# Patient Record
Sex: MALE | Race: WHITE | NOT HISPANIC OR LATINO | Employment: UNEMPLOYED | URBAN - METROPOLITAN AREA
[De-identification: names, ages, dates, MRNs, and addresses within clinical notes are randomized per-mention and may not be internally consistent; named-entity substitution may affect disease eponyms.]

---

## 2023-03-24 LAB
ALANINE AMINOTRANSFERASE (SGPT) (U/L) IN SER/PLAS: 20 U/L (ref 10–52)
ALBUMIN (G/DL) IN SER/PLAS: 4.4 G/DL (ref 3.4–5)
ALKALINE PHOSPHATASE (U/L) IN SER/PLAS: 63 U/L (ref 33–136)
ANION GAP IN SER/PLAS: 16 MMOL/L (ref 10–20)
ASPARTATE AMINOTRANSFERASE (SGOT) (U/L) IN SER/PLAS: 19 U/L (ref 9–39)
BASOPHILS (10*3/UL) IN BLOOD BY AUTOMATED COUNT: 0.04 X10E9/L (ref 0–0.1)
BASOPHILS/100 LEUKOCYTES IN BLOOD BY AUTOMATED COUNT: 0.4 % (ref 0–2)
BILIRUBIN TOTAL (MG/DL) IN SER/PLAS: 0.7 MG/DL (ref 0–1.2)
CALCIUM (MG/DL) IN SER/PLAS: 9.7 MG/DL (ref 8.6–10.6)
CARBON DIOXIDE, TOTAL (MMOL/L) IN SER/PLAS: 29 MMOL/L (ref 21–32)
CHLORIDE (MMOL/L) IN SER/PLAS: 99 MMOL/L (ref 98–107)
CHOLESTEROL (MG/DL) IN SER/PLAS: 186 MG/DL (ref 0–199)
CHOLESTEROL IN HDL (MG/DL) IN SER/PLAS: 45.4 MG/DL
CHOLESTEROL/HDL RATIO: 4.1
CREATININE (MG/DL) IN SER/PLAS: 0.89 MG/DL (ref 0.5–1.3)
EOSINOPHILS (10*3/UL) IN BLOOD BY AUTOMATED COUNT: 0.09 X10E9/L (ref 0–0.7)
EOSINOPHILS/100 LEUKOCYTES IN BLOOD BY AUTOMATED COUNT: 1 % (ref 0–6)
ERYTHROCYTE DISTRIBUTION WIDTH (RATIO) BY AUTOMATED COUNT: 13.2 % (ref 11.5–14.5)
ERYTHROCYTE MEAN CORPUSCULAR HEMOGLOBIN CONCENTRATION (G/DL) BY AUTOMATED: 31.6 G/DL (ref 32–36)
ERYTHROCYTE MEAN CORPUSCULAR VOLUME (FL) BY AUTOMATED COUNT: 92 FL (ref 80–100)
ERYTHROCYTES (10*6/UL) IN BLOOD BY AUTOMATED COUNT: 5.18 X10E12/L (ref 4.5–5.9)
ESTIMATED AVERAGE GLUCOSE FOR HBA1C: 177 MG/DL
GFR MALE: >90 ML/MIN/1.73M2
GLUCOSE (MG/DL) IN SER/PLAS: 132 MG/DL (ref 74–99)
HEMATOCRIT (%) IN BLOOD BY AUTOMATED COUNT: 47.4 % (ref 41–52)
HEMOGLOBIN (G/DL) IN BLOOD: 15 G/DL (ref 13.5–17.5)
HEMOGLOBIN A1C/HEMOGLOBIN TOTAL IN BLOOD: 7.8 %
IMMATURE GRANULOCYTES/100 LEUKOCYTES IN BLOOD BY AUTOMATED COUNT: 0.3 % (ref 0–0.9)
LDL: 92 MG/DL (ref 0–99)
LEUKOCYTES (10*3/UL) IN BLOOD BY AUTOMATED COUNT: 9.4 X10E9/L (ref 4.4–11.3)
LYMPHOCYTES (10*3/UL) IN BLOOD BY AUTOMATED COUNT: 1.59 X10E9/L (ref 1.2–4.8)
LYMPHOCYTES/100 LEUKOCYTES IN BLOOD BY AUTOMATED COUNT: 16.9 % (ref 13–44)
MONOCYTES (10*3/UL) IN BLOOD BY AUTOMATED COUNT: 0.51 X10E9/L (ref 0.1–1)
MONOCYTES/100 LEUKOCYTES IN BLOOD BY AUTOMATED COUNT: 5.4 % (ref 2–10)
NEUTROPHILS (10*3/UL) IN BLOOD BY AUTOMATED COUNT: 7.13 X10E9/L (ref 1.2–7.7)
NEUTROPHILS/100 LEUKOCYTES IN BLOOD BY AUTOMATED COUNT: 76 % (ref 40–80)
NON HDL CHOLESTEROL: 141 MG/DL
NRBC (PER 100 WBCS) BY AUTOMATED COUNT: 0 /100 WBC (ref 0–0)
PLATELETS (10*3/UL) IN BLOOD AUTOMATED COUNT: 219 X10E9/L (ref 150–450)
POTASSIUM (MMOL/L) IN SER/PLAS: 4.6 MMOL/L (ref 3.5–5.3)
PROTEIN TOTAL: 7.1 G/DL (ref 6.4–8.2)
SODIUM (MMOL/L) IN SER/PLAS: 139 MMOL/L (ref 136–145)
THYROTROPIN (MIU/L) IN SER/PLAS BY DETECTION LIMIT <= 0.05 MIU/L: 3.84 MIU/L (ref 0.44–3.98)
THYROXINE (T4) FREE (NG/DL) IN SER/PLAS: 1.22 NG/DL (ref 0.78–1.48)
TRIGLYCERIDE (MG/DL) IN SER/PLAS: 241 MG/DL (ref 0–149)
UREA NITROGEN (MG/DL) IN SER/PLAS: 19 MG/DL (ref 6–23)
VLDL: 48 MG/DL (ref 0–40)

## 2023-10-06 ENCOUNTER — LAB (OUTPATIENT)
Dept: LAB | Facility: LAB | Age: 63
End: 2023-10-06
Payer: COMMERCIAL

## 2023-10-06 DIAGNOSIS — E11.9 TYPE 2 DIABETES MELLITUS WITHOUT COMPLICATIONS (MULTI): Primary | ICD-10-CM

## 2023-10-06 LAB
EST. AVERAGE GLUCOSE BLD GHB EST-MCNC: 163 MG/DL
HBA1C MFR BLD: 7.3 %

## 2023-10-06 PROCEDURE — 36415 COLL VENOUS BLD VENIPUNCTURE: CPT

## 2023-10-06 PROCEDURE — 83036 HEMOGLOBIN GLYCOSYLATED A1C: CPT

## 2023-10-09 DIAGNOSIS — K21.9 GASTROESOPHAGEAL REFLUX DISEASE, UNSPECIFIED WHETHER ESOPHAGITIS PRESENT: ICD-10-CM

## 2023-10-09 PROBLEM — R53.81 MALAISE AND FATIGUE: Status: ACTIVE | Noted: 2023-10-09

## 2023-10-09 PROBLEM — K42.9 UMBILICAL HERNIA: Status: ACTIVE | Noted: 2023-10-09

## 2023-10-09 PROBLEM — E11.9 DIABETES (MULTI): Status: ACTIVE | Noted: 2023-10-09

## 2023-10-09 PROBLEM — E78.5 HYPERLIPIDEMIA: Status: ACTIVE | Noted: 2023-10-09

## 2023-10-09 PROBLEM — M47.817 LUMBOSACRAL SPONDYLOSIS: Status: ACTIVE | Noted: 2023-10-09

## 2023-10-09 PROBLEM — M54.50 LOW BACK PAIN: Status: ACTIVE | Noted: 2023-10-09

## 2023-10-09 PROBLEM — M25.50 JOINT PAIN: Status: ACTIVE | Noted: 2023-10-09

## 2023-10-09 PROBLEM — G47.33 OBSTRUCTIVE SLEEP APNEA SYNDROME: Status: ACTIVE | Noted: 2023-10-09

## 2023-10-09 PROBLEM — K52.9 GASTROENTERITIS: Status: ACTIVE | Noted: 2023-10-09

## 2023-10-09 PROBLEM — F41.9 ANXIETY: Status: ACTIVE | Noted: 2023-10-09

## 2023-10-09 PROBLEM — I10 BENIGN ESSENTIAL HYPERTENSION: Status: ACTIVE | Noted: 2023-10-09

## 2023-10-09 PROBLEM — F17.210 NICOTINE DEPENDENCE, CIGARETTES, UNCOMPLICATED: Status: ACTIVE | Noted: 2023-10-09

## 2023-10-09 PROBLEM — R53.83 MALAISE AND FATIGUE: Status: ACTIVE | Noted: 2023-10-09

## 2023-10-09 PROBLEM — J20.9 COPD WITH ACUTE BRONCHITIS (MULTI): Status: ACTIVE | Noted: 2023-10-09

## 2023-10-09 PROBLEM — J44.0 COPD WITH ACUTE BRONCHITIS (MULTI): Status: ACTIVE | Noted: 2023-10-09

## 2023-10-09 PROBLEM — M10.9 GOUT: Status: ACTIVE | Noted: 2023-10-09

## 2023-10-09 PROBLEM — E11.9 TYPE 2 DIABETES MELLITUS WITHOUT COMPLICATIONS (MULTI): Status: ACTIVE | Noted: 2023-10-09

## 2023-10-09 PROBLEM — M54.2 CERVICALGIA: Status: ACTIVE | Noted: 2023-10-09

## 2023-10-09 PROBLEM — R06.81 APNEA: Status: ACTIVE | Noted: 2023-10-09

## 2023-10-09 PROBLEM — M17.12 PRIMARY OSTEOARTHRITIS OF LEFT KNEE: Status: ACTIVE | Noted: 2023-10-09

## 2023-10-09 PROBLEM — R53.83 FATIGUE: Status: ACTIVE | Noted: 2023-10-09

## 2023-10-09 PROBLEM — R14.0 ABDOMINAL BLOATING: Status: ACTIVE | Noted: 2023-10-09

## 2023-10-09 PROBLEM — M54.32 SCIATICA OF LEFT SIDE: Status: ACTIVE | Noted: 2023-10-09

## 2023-10-09 PROBLEM — K63.5 COLON POLYPS: Status: ACTIVE | Noted: 2023-10-09

## 2023-10-09 PROBLEM — J45.901 ASTHMATIC BRONCHITIS WITH ACUTE EXACERBATION (HHS-HCC): Status: ACTIVE | Noted: 2023-10-09

## 2023-10-09 PROBLEM — G47.00 INSOMNIA: Status: ACTIVE | Noted: 2023-10-09

## 2023-10-09 PROBLEM — M54.16 LUMBAR RADICULOPATHY, CHRONIC: Status: ACTIVE | Noted: 2023-10-09

## 2023-10-09 RX ORDER — ZINC GLUCONATE 50 MG
1 TABLET ORAL DAILY
COMMUNITY
Start: 2022-09-08 | End: 2023-11-16 | Stop reason: ALTCHOICE

## 2023-10-09 RX ORDER — METFORMIN HYDROCHLORIDE 500 MG/1
1000 TABLET ORAL 2 TIMES DAILY
COMMUNITY
End: 2023-12-18 | Stop reason: SDUPTHER

## 2023-10-09 RX ORDER — BUDESONIDE 180 UG/1
AEROSOL, POWDER RESPIRATORY (INHALATION) 2 TIMES DAILY PRN
COMMUNITY
Start: 2021-09-02 | End: 2024-02-07 | Stop reason: ALTCHOICE

## 2023-10-09 RX ORDER — BUDESONIDE AND FORMOTEROL FUMARATE DIHYDRATE 160; 4.5 UG/1; UG/1
2 AEROSOL RESPIRATORY (INHALATION) 2 TIMES DAILY PRN
COMMUNITY
Start: 2021-09-02

## 2023-10-09 RX ORDER — GABAPENTIN 100 MG/1
1 CAPSULE ORAL
COMMUNITY
Start: 2022-05-05 | End: 2023-11-16 | Stop reason: ALTCHOICE

## 2023-10-09 RX ORDER — FLUTICASONE PROPIONATE 50 MCG
1 SPRAY, SUSPENSION (ML) NASAL 2 TIMES DAILY PRN
COMMUNITY
End: 2024-03-12 | Stop reason: SDUPTHER

## 2023-10-09 RX ORDER — PNV NO.95/FERROUS FUM/FOLIC AC 28MG-0.8MG
1 TABLET ORAL DAILY
COMMUNITY
Start: 2022-09-08 | End: 2023-11-16 | Stop reason: ALTCHOICE

## 2023-10-09 RX ORDER — ASPIRIN 325 MG
1 TABLET ORAL DAILY
COMMUNITY
Start: 2022-09-08 | End: 2023-11-16 | Stop reason: ALTCHOICE

## 2023-10-09 RX ORDER — GABAPENTIN 300 MG/1
300 CAPSULE ORAL 3 TIMES DAILY
COMMUNITY
Start: 2022-12-22 | End: 2023-11-16 | Stop reason: ALTCHOICE

## 2023-10-09 RX ORDER — FOLIC ACID 1 MG/1
1 TABLET ORAL DAILY
COMMUNITY
End: 2024-02-12

## 2023-10-09 RX ORDER — IBUPROFEN 100 MG/5ML
1 SUSPENSION, ORAL (FINAL DOSE FORM) ORAL DAILY
COMMUNITY
Start: 2022-09-08 | End: 2023-11-16 | Stop reason: ALTCHOICE

## 2023-10-09 RX ORDER — MULTIVIT WITH MINERALS/HERBS
1 TABLET ORAL 2 TIMES DAILY
COMMUNITY
Start: 2022-09-08 | End: 2023-11-16 | Stop reason: ALTCHOICE

## 2023-10-09 RX ORDER — ROSUVASTATIN CALCIUM 40 MG/1
40 TABLET, COATED ORAL DAILY
COMMUNITY
End: 2023-11-03

## 2023-10-09 RX ORDER — ALLOPURINOL 300 MG/1
300 TABLET ORAL DAILY
COMMUNITY
End: 2023-11-16 | Stop reason: ALTCHOICE

## 2023-10-09 RX ORDER — OMEPRAZOLE 40 MG/1
40 CAPSULE, DELAYED RELEASE ORAL
Qty: 30 CAPSULE | Refills: 0 | Status: SHIPPED | OUTPATIENT
Start: 2023-10-09 | End: 2023-11-16 | Stop reason: ALTCHOICE

## 2023-10-09 RX ORDER — AMLODIPINE BESYLATE 10 MG/1
10 TABLET ORAL DAILY
COMMUNITY
End: 2024-01-02 | Stop reason: SDUPTHER

## 2023-10-09 RX ORDER — VALACYCLOVIR HYDROCHLORIDE 1 G/1
1 TABLET, FILM COATED ORAL 3 TIMES DAILY
COMMUNITY
Start: 2022-02-25 | End: 2023-11-16 | Stop reason: ALTCHOICE

## 2023-10-09 RX ORDER — FENOFIBRATE 145 MG/1
145 TABLET, FILM COATED ORAL DAILY
COMMUNITY
End: 2024-01-02 | Stop reason: SDUPTHER

## 2023-10-09 RX ORDER — TRIAMCINOLONE ACETONIDE 1 MG/G
OINTMENT TOPICAL
COMMUNITY
Start: 2020-08-20

## 2023-10-09 RX ORDER — LANCETS 33 GAUGE
EACH MISCELLANEOUS
COMMUNITY
Start: 2023-08-16 | End: 2023-12-06 | Stop reason: SDUPTHER

## 2023-10-11 DIAGNOSIS — E78.5 HYPERLIPIDEMIA, UNSPECIFIED HYPERLIPIDEMIA TYPE: ICD-10-CM

## 2023-10-11 RX ORDER — ICOSAPENT ETHYL 1000 MG/1
1 CAPSULE ORAL DAILY
COMMUNITY
End: 2023-10-11 | Stop reason: SDUPTHER

## 2023-10-12 RX ORDER — ICOSAPENT ETHYL 1000 MG/1
1 CAPSULE ORAL DAILY
Qty: 90 CAPSULE | Refills: 0 | Status: SHIPPED | OUTPATIENT
Start: 2023-10-12 | End: 2024-01-15

## 2023-11-03 DIAGNOSIS — E78.5 HYPERLIPIDEMIA, UNSPECIFIED HYPERLIPIDEMIA TYPE: ICD-10-CM

## 2023-11-03 RX ORDER — ROSUVASTATIN CALCIUM 40 MG/1
40 TABLET, COATED ORAL DAILY
Qty: 90 TABLET | Refills: 0 | Status: SHIPPED | OUTPATIENT
Start: 2023-11-03 | End: 2024-02-05

## 2023-11-16 ENCOUNTER — OFFICE VISIT (OUTPATIENT)
Dept: PRIMARY CARE | Facility: CLINIC | Age: 63
End: 2023-11-16
Payer: COMMERCIAL

## 2023-11-16 VITALS — HEART RATE: 72 BPM | RESPIRATION RATE: 18 BRPM | BODY MASS INDEX: 35.81 KG/M2 | HEIGHT: 75 IN | WEIGHT: 288 LBS

## 2023-11-16 DIAGNOSIS — M47.817 LUMBAR AND SACRAL SPONDYLARTHRITIS: Primary | ICD-10-CM

## 2023-11-16 DIAGNOSIS — M47.27 OSTEOARTHRITIS OF SPINE WITH RADICULOPATHY, LUMBOSACRAL REGION: ICD-10-CM

## 2023-11-16 DIAGNOSIS — R53.83 MALAISE AND FATIGUE: ICD-10-CM

## 2023-11-16 DIAGNOSIS — R53.81 MALAISE AND FATIGUE: ICD-10-CM

## 2023-11-16 DIAGNOSIS — K21.9 GASTROESOPHAGEAL REFLUX DISEASE WITHOUT ESOPHAGITIS: ICD-10-CM

## 2023-11-16 DIAGNOSIS — E11.9 TYPE 2 DIABETES MELLITUS WITHOUT COMPLICATION, WITHOUT LONG-TERM CURRENT USE OF INSULIN (MULTI): ICD-10-CM

## 2023-11-16 DIAGNOSIS — J20.9 COPD WITH ACUTE BRONCHITIS (MULTI): ICD-10-CM

## 2023-11-16 DIAGNOSIS — I10 BENIGN ESSENTIAL HYPERTENSION: ICD-10-CM

## 2023-11-16 DIAGNOSIS — M54.16 LUMBAR RADICULOPATHY, CHRONIC: ICD-10-CM

## 2023-11-16 DIAGNOSIS — E78.49 OTHER HYPERLIPIDEMIA: ICD-10-CM

## 2023-11-16 DIAGNOSIS — J44.0 COPD WITH ACUTE BRONCHITIS (MULTI): ICD-10-CM

## 2023-11-16 DIAGNOSIS — E03.9 ACQUIRED HYPOTHYROIDISM: ICD-10-CM

## 2023-11-16 DIAGNOSIS — F17.210 NICOTINE DEPENDENCE, CIGARETTES, UNCOMPLICATED: ICD-10-CM

## 2023-11-16 DIAGNOSIS — R52 GENERALIZED PAIN: ICD-10-CM

## 2023-11-16 DIAGNOSIS — N40.0 BENIGN PROSTATIC HYPERPLASIA WITHOUT LOWER URINARY TRACT SYMPTOMS: ICD-10-CM

## 2023-11-16 PROBLEM — M79.641 PAIN OF RIGHT HAND: Status: ACTIVE | Noted: 2023-11-16

## 2023-11-16 PROBLEM — J32.9 SINUSITIS: Status: ACTIVE | Noted: 2023-11-16

## 2023-11-16 PROBLEM — M79.675 CHRONIC PAIN OF TOES OF BOTH FEET: Status: ACTIVE | Noted: 2023-11-16

## 2023-11-16 PROBLEM — J31.0 CHRONIC RHINITIS: Status: RESOLVED | Noted: 2023-11-16 | Resolved: 2023-11-16

## 2023-11-16 PROBLEM — M25.562 LEFT KNEE PAIN: Status: ACTIVE | Noted: 2023-11-16

## 2023-11-16 PROBLEM — B36.9 FUNGAL SKIN INFECTION: Status: ACTIVE | Noted: 2023-11-16

## 2023-11-16 PROBLEM — G60.9 IDIOPATHIC PERIPHERAL NEUROPATHY: Status: ACTIVE | Noted: 2023-11-16

## 2023-11-16 PROBLEM — J34.2 NASAL SEPTAL DEVIATION: Status: ACTIVE | Noted: 2023-11-16

## 2023-11-16 PROBLEM — L40.9 PSORIASIS: Status: ACTIVE | Noted: 2023-11-16

## 2023-11-16 PROBLEM — M79.674 CHRONIC PAIN OF TOES OF BOTH FEET: Status: ACTIVE | Noted: 2023-11-16

## 2023-11-16 PROBLEM — M54.9 PAIN, UPPER BACK: Status: ACTIVE | Noted: 2023-11-16

## 2023-11-16 PROBLEM — L73.9 FOLLICULITIS: Status: ACTIVE | Noted: 2023-11-16

## 2023-11-16 PROBLEM — M47.816 LUMBAR SPONDYLOSIS: Status: ACTIVE | Noted: 2023-11-16

## 2023-11-16 PROBLEM — L03.112 CELLULITIS OF AXILLA, LEFT: Status: RESOLVED | Noted: 2023-11-16 | Resolved: 2023-11-16

## 2023-11-16 PROBLEM — R73.9 HYPERGLYCEMIA: Status: ACTIVE | Noted: 2023-11-16

## 2023-11-16 PROBLEM — M79.89 BILATERAL SWELLING OF FEET: Status: ACTIVE | Noted: 2023-11-16

## 2023-11-16 PROBLEM — M19.049 HAND ARTHRITIS: Status: ACTIVE | Noted: 2023-11-16

## 2023-11-16 PROBLEM — G89.29 CHRONIC PAIN OF TOES OF BOTH FEET: Status: ACTIVE | Noted: 2023-11-16

## 2023-11-16 PROBLEM — E11.42 DIABETIC PERIPHERAL NEUROPATHY (MULTI): Status: ACTIVE | Noted: 2023-11-16

## 2023-11-16 PROBLEM — L30.9 DERMATITIS: Status: ACTIVE | Noted: 2023-11-16

## 2023-11-16 PROBLEM — M19.041 DEGENERATIVE ARTHRITIS OF FINGER, RIGHT: Status: ACTIVE | Noted: 2023-11-16

## 2023-11-16 PROBLEM — R20.2 PARESTHESIA OF BOTH FEET: Status: ACTIVE | Noted: 2023-11-16

## 2023-11-16 PROBLEM — E66.9 OBESITY (BMI 30-39.9): Status: ACTIVE | Noted: 2023-11-16

## 2023-11-16 PROBLEM — G57.93 NEUROPATHIC PAIN OF BOTH FEET: Status: ACTIVE | Noted: 2023-11-16

## 2023-11-16 PROCEDURE — 99214 OFFICE O/P EST MOD 30 MIN: CPT | Performed by: INTERNAL MEDICINE

## 2023-11-16 PROCEDURE — 3051F HG A1C>EQUAL 7.0%<8.0%: CPT | Performed by: INTERNAL MEDICINE

## 2023-11-16 RX ORDER — OMEPRAZOLE 40 MG/1
1 CAPSULE, DELAYED RELEASE ORAL DAILY
COMMUNITY
Start: 2015-10-28 | End: 2024-01-08

## 2023-11-16 RX ORDER — ALLOPURINOL 200 MG/1
300 TABLET ORAL
COMMUNITY
End: 2024-02-07 | Stop reason: ALTCHOICE

## 2023-11-16 RX ORDER — TRAMADOL HYDROCHLORIDE 50 MG/1
50 TABLET ORAL DAILY
Qty: 30 TABLET | Refills: 0 | Status: SHIPPED | OUTPATIENT
Start: 2023-11-16 | End: 2024-02-20 | Stop reason: ALTCHOICE

## 2023-11-16 RX ORDER — PREDNISONE 10 MG/1
TABLET ORAL
Qty: 30 TABLET | Refills: 0 | Status: SHIPPED | OUTPATIENT
Start: 2023-11-16 | End: 2023-11-26

## 2023-11-16 ASSESSMENT — ENCOUNTER SYMPTOMS
CONSTITUTIONAL NEGATIVE: 1
NEUROLOGICAL NEGATIVE: 1
MUSCULOSKELETAL NEGATIVE: 1
HEMATOLOGIC/LYMPHATIC NEGATIVE: 1
ALLERGIC/IMMUNOLOGIC NEGATIVE: 1
PSYCHIATRIC NEGATIVE: 1
RESPIRATORY NEGATIVE: 1
GASTROINTESTINAL NEGATIVE: 1
CARDIOVASCULAR NEGATIVE: 1
EYES NEGATIVE: 1
ENDOCRINE NEGATIVE: 1

## 2023-11-16 NOTE — ASSESSMENT & PLAN NOTE
DDD - Degenerative disc disease of the Lumbo-Sacral (LS)/spine. Educate exercises and referred patient to Physical Therapy (PT). Ordered X-Ray's of the LS/ spine. Consider MRI. Radiculopathy in the distribution of L4-L5-S1/ nerve roots, needs NSAIDS (Naprosin 500mg BID vs. Arthrotec 75mg BID or Prednisone taper (Medrol dose pack), Flexeril 10 mg qHS, heat application, and pain control with Tylenol 325 mg TID.

## 2023-11-16 NOTE — ASSESSMENT & PLAN NOTE
HTN - hypertension well/controlled .Target BP < 130/80  achieved. Educate low salt diet and exercise with weight loss. Educate home self monitoring and diary keeping. Educate risks of elevate blood pressure and benefits of prompt treatment.  Refill Amlodipine

## 2023-11-16 NOTE — ASSESSMENT & PLAN NOTE
Hypercholesterolemia - Monitor lipid profile and educate patient upon risks of high cholesterol and targets. Educate diet and change in lifestyle and increase in exercises - Refill: Fenofibrate and  Rosuvastatin and Vascepa  and educate compliance with medication and diet.

## 2023-11-16 NOTE — PROGRESS NOTES
"Subjective   Patient ID: Hammad Blackburn is a 62 y.o. male who presents for Follow-up (3 month follow up).    HPI     Review of Systems   Constitutional: Negative.    HENT: Negative.     Eyes: Negative.    Respiratory: Negative.     Cardiovascular: Negative.    Gastrointestinal: Negative.    Endocrine: Negative.    Musculoskeletal: Negative.    Skin: Negative.    Allergic/Immunologic: Negative.    Neurological: Negative.    Hematological: Negative.    Psychiatric/Behavioral: Negative.     All other systems reviewed and are negative.      Objective   Pulse 72   Ht 1.905 m (6' 3\")   Wt 131 kg (288 lb)   BMI 36.00 kg/m²     Physical Exam  Vitals and nursing note reviewed.   Constitutional:       Appearance: Normal appearance.   HENT:      Head: Normocephalic and atraumatic.      Right Ear: Tympanic membrane, ear canal and external ear normal.      Left Ear: Tympanic membrane, ear canal and external ear normal. There is no impacted cerumen.      Nose: Nose normal.      Mouth/Throat:      Mouth: Mucous membranes are moist.      Pharynx: Oropharynx is clear.   Eyes:      Extraocular Movements: Extraocular movements intact.      Conjunctiva/sclera: Conjunctivae normal.      Pupils: Pupils are equal, round, and reactive to light.   Cardiovascular:      Rate and Rhythm: Normal rate and regular rhythm.      Pulses: Normal pulses.      Heart sounds: Normal heart sounds. No murmur heard.  Pulmonary:      Effort: Pulmonary effort is normal. No respiratory distress.      Breath sounds: Normal breath sounds. No stridor. No wheezing, rhonchi or rales.   Chest:      Chest wall: No tenderness.   Abdominal:      General: Abdomen is flat. Bowel sounds are normal. There is no distension.      Palpations: Abdomen is soft. There is no mass.      Tenderness: There is no abdominal tenderness. There is no right CVA tenderness, left CVA tenderness, guarding or rebound.      Hernia: No hernia is present.   Musculoskeletal:         General: " Normal range of motion.      Cervical back: Normal range of motion and neck supple.   Skin:     General: Skin is warm.      Capillary Refill: Capillary refill takes less than 2 seconds.   Neurological:      General: No focal deficit present.      Mental Status: He is alert.      Cranial Nerves: No cranial nerve deficit.      Sensory: No sensory deficit.      Motor: No weakness.      Coordination: Coordination normal.      Gait: Gait normal.      Deep Tendon Reflexes: Reflexes normal.   Psychiatric:         Mood and Affect: Mood normal.         Behavior: Behavior normal. Behavior is cooperative.         Thought Content: Thought content normal.         Judgment: Judgment normal.         Assessment/Plan   Problem List Items Addressed This Visit             ICD-10-CM    Benign essential hypertension I10     HTN - hypertension well/controlled .Target BP < 130/80  achieved. Educate low salt diet and exercise with weight loss. Educate home self monitoring and diary keeping. Educate risks of elevate blood pressure and benefits of prompt treatment.  Refill Amlodipine          COPD with acute bronchitis (CMS/[facility]) J44.0, J20.9     COPD - Educate tobacco cessation extensively , no wheezing, no SOB, no Crackles heard/ Exacerbation.         Get CXR.  Continues mild exercises and inhalers.  [place] preventive care and vaccinations.                                       Add advair inhalers and spiriva inhaler.           Esophageal reflux K21.9     GERD - Acid reflux disease. Rx. PPI (Prilosec/Prevacid/Protonix/Nexium) and educate diet and life style changes. Referred patient to an endoscopy (EGD) and check H. Pylori titers.          Hyperlipidemia E78.5     Hypercholesterolemia - Monitor lipid profile and educate patient upon risks of high cholesterol and targets. Educate diet and change in lifestyle and increase in exercises - Refill: Fenofibrate and  Rosuvastatin and Vascepa  and educate compliance with medication and diet.            Malaise and fatigue R53.81, R53.83     Fatigue - check CMP(metabolic panel and elctrolytes) , CBC(complete blood cell count), TSH(thyroid function). Insomnia may play a role and sleep studies(rule out sleep apnea) are recommended . Educate sleep hygiene. Consider anxiety disorder vs. depression. Consider Stress test, and 2DECHO.           Lumbosacral spondylosis M47.817     DDD - Degenerative disc disease of the Lumbo-Sacral (LS)/spine. Educate exercises and referred patient to Physical Therapy (PT). Ordered X-Ray's of the LS/ spine. Consider MRI. Radiculopathy in the distribution of L4-L5-S1/ nerve roots, needs NSAIDS (Naprosin 500mg BID vs. Arthrotec 75mg BID or Prednisone taper (Medrol dose pack), Flexeril 10 mg qHS, heat application, and pain control with Tylenol 325 mg TID.            Lumbar radiculopathy, chronic M54.16     DDD - Degenerative disc disease of the Lumbo-Sacral (LS)/spine. Educate exercises and referred patient to Physical Therapy (PT). Ordered X-Ray's of the LS/ spine. Consider MRI. Radiculopathy in the distribution of L4-L5-S1/ nerve roots, needs NSAIDS (Naprosin 500mg BID vs. Arthrotec 75mg BID or Prednisone taper (Medrol dose pack), Flexeril 10 mg qHS, heat application, and pain control with Tylenol 325 mg TID.           Nicotine dependence, cigarettes, uncomplicated F17.210     Tobacco cessation - Educate risks of smoking (CAD/COPD/CANCER of the lung or urinary bladder/CVA). Educate life style changes and prescribe nicotine patch and Wellbutrin 150mg BID. Educate stress reduction.                                                                                      Type 2 diabetes mellitus without complications (CMS/[facility]) E11.9     DM - NIDDM  . Reviewed with patient / Will check  HbA1c and fasting blood sugars. Educate home self monitoring and diary keeping(reviewed with patient home blood sugar levels /diary). Educate extensively low calorie diet and weight loss with exercise. Reviewed  BS- diary and Rx. Regimen. [place] renal protection with ARB/ACEI.               Educate compliance with diet and Rx. And educate risks and autcomes.                                                 Generalized pain R52     Other Visit Diagnoses         Codes    Lumbar and sacral spondylarthritis    -  Primary M47.817    Relevant Medications    predniSONE (Deltasone) 10 mg tablet    Other Relevant Orders    Referral to Spine Surgery    Referral to Pain Medicine

## 2023-11-16 NOTE — ASSESSMENT & PLAN NOTE
DM - NIDDM  . Reviewed with patient / Will check  HbA1c and fasting blood sugars. Educate home self monitoring and diary keeping(reviewed with patient home blood sugar levels /diary). Educate extensively low calorie diet and weight loss with exercise. Reviewed BS- diary and Rx. Regimen. [place] renal protection with ARB/ACEI.               Educate compliance with diet and Rx. And educate risks and autcomes.

## 2023-11-16 NOTE — ASSESSMENT & PLAN NOTE
COPD - Educate tobacco cessation extensively , no wheezing, no SOB, no Crackles heard/ Exacerbation.         Get CXR.  Continues mild exercises and inhalers.  [place] preventive care and vaccinations.                                       Add advair inhalers and spiriva inhaler.

## 2023-11-16 NOTE — ASSESSMENT & PLAN NOTE
Tobacco cessation - Educate risks of smoking (CAD/COPD/CANCER of the lung or urinary bladder/CVA). Educate life style changes and prescribe nicotine patch and Wellbutrin 150mg BID. Educate stress reduction.

## 2023-12-06 DIAGNOSIS — E11.9 TYPE 2 DIABETES MELLITUS WITHOUT COMPLICATION, WITHOUT LONG-TERM CURRENT USE OF INSULIN (MULTI): ICD-10-CM

## 2023-12-06 RX ORDER — LANCETS 33 GAUGE
1 EACH MISCELLANEOUS DAILY
Qty: 100 EACH | Refills: 0 | Status: SHIPPED | OUTPATIENT
Start: 2023-12-06 | End: 2024-03-12 | Stop reason: SDUPTHER

## 2023-12-18 DIAGNOSIS — E11.9 TYPE 2 DIABETES MELLITUS WITHOUT COMPLICATION, WITHOUT LONG-TERM CURRENT USE OF INSULIN (MULTI): ICD-10-CM

## 2023-12-18 RX ORDER — METFORMIN HYDROCHLORIDE 500 MG/1
1000 TABLET ORAL 2 TIMES DAILY
Qty: 360 TABLET | Refills: 0 | Status: ON HOLD | OUTPATIENT
Start: 2023-12-18 | End: 2024-03-11 | Stop reason: SDUPTHER

## 2024-01-02 ENCOUNTER — TELEPHONE (OUTPATIENT)
Dept: PRIMARY CARE | Facility: CLINIC | Age: 64
End: 2024-01-02
Payer: COMMERCIAL

## 2024-01-02 DIAGNOSIS — E78.49 OTHER HYPERLIPIDEMIA: ICD-10-CM

## 2024-01-02 DIAGNOSIS — I10 BENIGN ESSENTIAL HYPERTENSION: ICD-10-CM

## 2024-01-02 RX ORDER — AMLODIPINE BESYLATE 10 MG/1
10 TABLET ORAL DAILY
Qty: 90 TABLET | Refills: 0 | Status: SHIPPED | OUTPATIENT
Start: 2024-01-02 | End: 2024-04-01

## 2024-01-02 RX ORDER — FENOFIBRATE 145 MG/1
145 TABLET, FILM COATED ORAL DAILY
Qty: 90 TABLET | Refills: 0 | Status: SHIPPED | OUTPATIENT
Start: 2024-01-02 | End: 2024-04-01

## 2024-01-06 DIAGNOSIS — M17.12 ARTHRITIS OF LEFT KNEE: Primary | ICD-10-CM

## 2024-01-07 DIAGNOSIS — K21.9 GASTRO-ESOPHAGEAL REFLUX DISEASE WITHOUT ESOPHAGITIS: ICD-10-CM

## 2024-01-08 PROBLEM — M17.12 ARTHRITIS OF LEFT KNEE: Status: ACTIVE | Noted: 2024-01-06

## 2024-01-08 RX ORDER — OMEPRAZOLE 40 MG/1
40 CAPSULE, DELAYED RELEASE ORAL DAILY
Qty: 90 CAPSULE | Refills: 0 | Status: SHIPPED | OUTPATIENT
Start: 2024-01-08 | End: 2024-04-08 | Stop reason: SDUPTHER

## 2024-01-14 DIAGNOSIS — E78.5 HYPERLIPIDEMIA, UNSPECIFIED HYPERLIPIDEMIA TYPE: ICD-10-CM

## 2024-01-15 RX ORDER — ICOSAPENT ETHYL 1000 MG/1
1 CAPSULE ORAL DAILY
Qty: 90 CAPSULE | Refills: 0 | Status: SHIPPED | OUTPATIENT
Start: 2024-01-15 | End: 2024-04-11

## 2024-02-04 DIAGNOSIS — E78.5 HYPERLIPIDEMIA, UNSPECIFIED HYPERLIPIDEMIA TYPE: ICD-10-CM

## 2024-02-05 RX ORDER — ROSUVASTATIN CALCIUM 40 MG/1
40 TABLET, COATED ORAL DAILY
Qty: 90 TABLET | Refills: 0 | Status: SHIPPED | OUTPATIENT
Start: 2024-02-05 | End: 2024-05-10

## 2024-02-06 DIAGNOSIS — M17.12 ARTHRITIS OF LEFT KNEE: Primary | ICD-10-CM

## 2024-02-07 ENCOUNTER — PRE-ADMISSION TESTING (OUTPATIENT)
Dept: PREADMISSION TESTING | Facility: HOSPITAL | Age: 64
End: 2024-02-07
Payer: COMMERCIAL

## 2024-02-07 ENCOUNTER — OFFICE VISIT (OUTPATIENT)
Dept: ORTHOPEDIC SURGERY | Facility: CLINIC | Age: 64
End: 2024-02-07
Payer: COMMERCIAL

## 2024-02-07 ENCOUNTER — APPOINTMENT (OUTPATIENT)
Dept: RADIOLOGY | Facility: HOSPITAL | Age: 64
End: 2024-02-07
Payer: COMMERCIAL

## 2024-02-07 ENCOUNTER — HOSPITAL ENCOUNTER (OUTPATIENT)
Dept: RADIOLOGY | Facility: HOSPITAL | Age: 64
Discharge: HOME | End: 2024-02-07
Payer: COMMERCIAL

## 2024-02-07 ENCOUNTER — APPOINTMENT (OUTPATIENT)
Dept: PREADMISSION TESTING | Facility: HOSPITAL | Age: 64
End: 2024-02-07
Payer: COMMERCIAL

## 2024-02-07 VITALS
DIASTOLIC BLOOD PRESSURE: 84 MMHG | RESPIRATION RATE: 18 BRPM | TEMPERATURE: 97.1 F | OXYGEN SATURATION: 97 % | SYSTOLIC BLOOD PRESSURE: 146 MMHG | WEIGHT: 285.27 LBS | HEIGHT: 75 IN | BODY MASS INDEX: 35.47 KG/M2 | HEART RATE: 67 BPM

## 2024-02-07 VITALS — WEIGHT: 288 LBS | BODY MASS INDEX: 36 KG/M2

## 2024-02-07 DIAGNOSIS — G89.29 CHRONIC PAIN OF LEFT KNEE: ICD-10-CM

## 2024-02-07 DIAGNOSIS — M25.562 CHRONIC PAIN OF LEFT KNEE: ICD-10-CM

## 2024-02-07 DIAGNOSIS — M17.12 ARTHRITIS OF LEFT KNEE: ICD-10-CM

## 2024-02-07 DIAGNOSIS — R73.09 ELEVATED GLUCOSE: ICD-10-CM

## 2024-02-07 DIAGNOSIS — M17.12 PRIMARY OSTEOARTHRITIS OF LEFT KNEE: Primary | ICD-10-CM

## 2024-02-07 DIAGNOSIS — Z01.810 PREOP CARDIOVASCULAR EXAM: Primary | ICD-10-CM

## 2024-02-07 LAB
ANION GAP SERPL CALC-SCNC: 16 MMOL/L (ref 10–20)
BASOPHILS # BLD AUTO: 0.02 X10*3/UL (ref 0–0.1)
BASOPHILS NFR BLD AUTO: 0.3 %
BUN SERPL-MCNC: 19 MG/DL (ref 6–23)
CALCIUM SERPL-MCNC: 9.7 MG/DL (ref 8.6–10.3)
CHLORIDE SERPL-SCNC: 102 MMOL/L (ref 98–107)
CO2 SERPL-SCNC: 27 MMOL/L (ref 21–32)
CREAT SERPL-MCNC: 0.99 MG/DL (ref 0.5–1.3)
EGFRCR SERPLBLD CKD-EPI 2021: 85 ML/MIN/1.73M*2
EOSINOPHIL # BLD AUTO: 0.08 X10*3/UL (ref 0–0.7)
EOSINOPHIL NFR BLD AUTO: 1 %
ERYTHROCYTE [DISTWIDTH] IN BLOOD BY AUTOMATED COUNT: 12.9 % (ref 11.5–14.5)
EST. AVERAGE GLUCOSE BLD GHB EST-MCNC: 171 MG/DL
GLUCOSE SERPL-MCNC: 166 MG/DL (ref 74–99)
HBA1C MFR BLD: 7.6 %
HCT VFR BLD AUTO: 43.7 % (ref 41–52)
HGB BLD-MCNC: 14.2 G/DL (ref 13.5–17.5)
IMM GRANULOCYTES # BLD AUTO: 0.02 X10*3/UL (ref 0–0.7)
IMM GRANULOCYTES NFR BLD AUTO: 0.3 % (ref 0–0.9)
LYMPHOCYTES # BLD AUTO: 1.49 X10*3/UL (ref 1.2–4.8)
LYMPHOCYTES NFR BLD AUTO: 18.9 %
MCH RBC QN AUTO: 29.5 PG (ref 26–34)
MCHC RBC AUTO-ENTMCNC: 32.5 G/DL (ref 32–36)
MCV RBC AUTO: 91 FL (ref 80–100)
MONOCYTES # BLD AUTO: 0.38 X10*3/UL (ref 0.1–1)
MONOCYTES NFR BLD AUTO: 4.8 %
NEUTROPHILS # BLD AUTO: 5.89 X10*3/UL (ref 1.2–7.7)
NEUTROPHILS NFR BLD AUTO: 74.7 %
NRBC BLD-RTO: NORMAL /100{WBCS}
PLATELET # BLD AUTO: 208 X10*3/UL (ref 150–450)
POTASSIUM SERPL-SCNC: 4.5 MMOL/L (ref 3.5–5.3)
RBC # BLD AUTO: 4.82 X10*6/UL (ref 4.5–5.9)
SODIUM SERPL-SCNC: 140 MMOL/L (ref 136–145)
WBC # BLD AUTO: 7.9 X10*3/UL (ref 4.4–11.3)

## 2024-02-07 PROCEDURE — 99214 OFFICE O/P EST MOD 30 MIN: CPT | Performed by: STUDENT IN AN ORGANIZED HEALTH CARE EDUCATION/TRAINING PROGRAM

## 2024-02-07 PROCEDURE — 99214 OFFICE O/P EST MOD 30 MIN: CPT | Performed by: NURSE PRACTITIONER

## 2024-02-07 PROCEDURE — 85025 COMPLETE CBC W/AUTO DIFF WBC: CPT

## 2024-02-07 PROCEDURE — 83036 HEMOGLOBIN GLYCOSYLATED A1C: CPT

## 2024-02-07 PROCEDURE — 93010 ELECTROCARDIOGRAM REPORT: CPT | Performed by: INTERNAL MEDICINE

## 2024-02-07 PROCEDURE — 73564 X-RAY EXAM KNEE 4 OR MORE: CPT | Mod: LT

## 2024-02-07 PROCEDURE — 93005 ELECTROCARDIOGRAM TRACING: CPT

## 2024-02-07 PROCEDURE — 77073 BONE LENGTH STUDIES: CPT

## 2024-02-07 PROCEDURE — 87081 CULTURE SCREEN ONLY: CPT

## 2024-02-07 PROCEDURE — 36415 COLL VENOUS BLD VENIPUNCTURE: CPT

## 2024-02-07 PROCEDURE — 80048 BASIC METABOLIC PNL TOTAL CA: CPT

## 2024-02-07 RX ORDER — UBIDECARENONE 30 MG
30 CAPSULE ORAL DAILY
COMMUNITY

## 2024-02-07 RX ORDER — CHLORHEXIDINE GLUCONATE ORAL RINSE 1.2 MG/ML
15 SOLUTION DENTAL DAILY
Qty: 30 ML | Refills: 0 | Status: SHIPPED | OUTPATIENT
Start: 2024-02-07 | End: 2024-02-09

## 2024-02-07 ASSESSMENT — ENCOUNTER SYMPTOMS: BACK PAIN: 1

## 2024-02-07 ASSESSMENT — PAIN - FUNCTIONAL ASSESSMENT: PAIN_FUNCTIONAL_ASSESSMENT: 0-10

## 2024-02-07 ASSESSMENT — PAIN SCALES - GENERAL: PAINLEVEL_OUTOF10: 4

## 2024-02-07 ASSESSMENT — PAIN DESCRIPTION - DESCRIPTORS: DESCRIPTORS: SHOOTING

## 2024-02-07 NOTE — PROGRESS NOTES
PRIMARY CARE PHYSICIAN: Richard Garcia MD  REFERRING PROVIDER: No referring provider defined for this encounter.       SUBJECTIVE  CHIEF COMPLAINT:   Chief Complaint   Patient presents with    Left Knee - Follow-up        HPI: Hammad Blackburn is a pleasant 63 y.o. year-old male who is seen today for evaluation of left knee pain.  Patient is scheduled for left total knee arthroplasty at the end of this month.  The patient continues to complain of disabling pain in the left knee.  He has tried intra-articular injections, prescription narcotics, over-the-counter medications, activity modification and physical therapy without sufficient relief of symptoms.  His quality life is negatively impacted and his ability to perform certain activities is limited.    He has not had any recent fall or trauma.    REVIEW OF SYSTEMS  There has been no interval change in this patient's past medical, surgical, medications, allergies, family history or social history since the most recent visit to a provider within our department.  14 point review of systems was performed, reviewed, and negative except for pertinent positives documented in the history of present illness.    Past Medical History:   Diagnosis Date    Anxiety disorder, unspecified 10/15/2014    Anxiety    Arthritis     Cellulitis of axilla, left 11/16/2023    Chronic pain disorder     COPD (chronic obstructive pulmonary disease) (CMS/HCC)     Diabetes mellitus (CMS/HCC)     Elevated prostate specific antigen (PSA)     Elevated prostate specific antigen (PSA)    GERD (gastroesophageal reflux disease)     Hyperlipidemia     Hypertension     Nephrolithiasis     Pain in left knee 08/25/2022    Acute pain of left knee    Pain in right elbow 01/04/2022    Right elbow pain    Personal history of diseases of the skin and subcutaneous tissue 10/07/2014    History of urticaria    Personal history of other diseases of the circulatory system 10/07/2014    History of hypertension     Personal history of other diseases of the musculoskeletal system and connective tissue 10/15/2014    Personal history of arthritis    Personal history of other diseases of the respiratory system 11/28/2016    History of sinusitis    Personal history of other endocrine, nutritional and metabolic disease     History of high cholesterol    Personal history of other mental and behavioral disorders 10/15/2014    History of depression    Personal history of other specified conditions 06/02/2015    History of fatigue    Pityriasis versicolor 05/18/2016    Tinea versicolor    Sleep apnea     Unilateral primary osteoarthritis, left knee 08/25/2022    Primary osteoarthritis of left knee        No Known Allergies     Past Surgical History:   Procedure Laterality Date    ADENOIDECTOMY  10/15/2014    Adenoidectomy    APPENDECTOMY  08/08/2016    Appendectomy    COLONOSCOPY  07/08/2013    Complete Colonoscopy    HERNIA REPAIR      OTHER SURGICAL HISTORY  08/08/2016    Knee Arthroscopy With Medial And Lateral Meniscus Repair    TONSILLECTOMY  10/15/2014    Tonsillectomy        Family History   Problem Relation Name Age of Onset    COPD Mother      Other (htn) Father      Diabetes Father          Social History     Socioeconomic History    Marital status:      Spouse name: Not on file    Number of children: Not on file    Years of education: Not on file    Highest education level: Not on file   Occupational History    Not on file   Tobacco Use    Smoking status: Every Day     Packs/day: 1.00     Years: 40.00     Additional pack years: 0.00     Total pack years: 40.00     Types: Cigarettes    Smokeless tobacco: Never   Vaping Use    Vaping Use: Never used   Substance and Sexual Activity    Alcohol use: Yes     Comment: Social.4 drinks week    Drug use: Never    Sexual activity: Not on file   Other Topics Concern    Not on file   Social History Narrative    Not on file     Social Determinants of Health     Financial Resource  Strain: Not on file   Food Insecurity: Not on file   Transportation Needs: Not on file   Physical Activity: Not on file   Stress: Not on file   Social Connections: Not on file   Intimate Partner Violence: Not on file   Housing Stability: Not on file        CURRENT MEDICATIONS:   Current Outpatient Medications   Medication Sig Dispense Refill    allopurinol (Zyloprim) 300 mg tablet TAKE 1 TABLET BY MOUTH EVERY DAY 90 tablet 0    amLODIPine (Norvasc) 10 mg tablet Take 1 tablet (10 mg) by mouth once daily. 90 tablet 0    budesonide-formoteroL (Symbicort) 160-4.5 mcg/actuation inhaler Inhale 2 puffs 2 times a day as needed.      chlorhexidine (Peridex) 0.12 % solution Use 15 mL in the mouth or throat once daily for 2 doses. 30 mL 0    co-enzyme Q-10 30 mg capsule Take 1 capsule (30 mg) by mouth once daily.      cyclobenzaprine (Flexeril) 10 mg tablet TAKE 1 TABLET BY MOUTH THREE TIMES A DAY AS NEEDED 90 tablet 0    fenofibrate (Tricor) 145 mg tablet Take 1 tablet (145 mg) by mouth once daily. Take with food. 90 tablet 0    fluticasone (Flonase) 50 mcg/actuation nasal spray Administer 1 spray into each nostril 2 times a day as needed.      folic acid (Folvite) 1 mg tablet Take 1 tablet (1 mg) by mouth once daily.      metFORMIN (Glucophage) 500 mg tablet Take 2 tablets (1,000 mg) by mouth 2 times a day. (Patient taking differently: Take 2 tablets (1,000 mg) by mouth 2 times a day. Patient takes 1 tablet in am, 2 tablet at dinner , and 1 tablet at bedtime) 360 tablet 0    omeprazole (PriLOSEC) 40 mg DR capsule TAKE 1 CAPSULE BY MOUTH EVERY DAY 90 capsule 0    OneTouch Delica Plus Lancet 30 gauge misc Apply 1 Lancet topically once daily. 100 each 0    rosuvastatin (Crestor) 40 mg tablet TAKE 1 TABLET BY MOUTH EVERY DAY 90 tablet 0    traMADol (Ultram) 50 mg tablet Take 1 tablet (50 mg) by mouth once daily. PRN for pain (Patient not taking: Reported on 2/7/2024) 30 tablet 0    triamcinolone (Kenalog) 0.1 % ointment  Triamcinolone Acetonide 0.1 % External Ointment   Quantity: 80  Refills: 0        Start : 20-Aug-2020   Active      Vascepa 1 gram capsule TAKE 1 CAPSULE (1 G) BY MOUTH ONCE DAILY. 90 capsule 0     No current facility-administered medications for this visit.        OBJECTIVE    PHYSICAL EXAM  Body mass index is 36 kg/m².    General: Well-appearing male in no acute distress.  Awake, alert and oriented.  Pleasant and cooperative.  Respiratory: Non-labored breathing  Mood: Euthymic   Gait: Antalgic  Assistive Device: None     Affected Left Knee  Limb Alignment: Severe varus  ROM: 5-110  Stable to varus and valgus stress at full extension and 30 degrees of flexion  Skin: Intact, no abrasions or draining sinuses  Effusion: None  Quad Strength: 5/5  Hamstring Strength: 5/5  Patella Crepitus: None  Patella Grind: Negative  Tenderness: Medial joint line  Sensation: Intact to light touch distally  Motor function: Able to fire TA, EHL, G/S  Pulses: Palpable DP pulse    IMAGING:  AP / lateral/ mid-flexion/sunrise views: Independent review of left knee x-rays was performed. The findings were reviewed with the patient. There are severe degenerative changes of the left knee with varus limb alignment. There is joint space narrowing, subchondral sclerosis, and osteophyte formation. No evidence of fracture, AVN, dislocation, osteomyelitis.        ASSESSMENT & PLAN    IMPRESSION:  Hammad Blackburn for more than six months has had limited function as well as persistent and severe pain which has negatively impacted the quality of life and interfered with activities of daily living. Under my care or the care of other providers, for greater than the three months, conservative treatment including activity modification, over the counter pain medications, injections, physical therapy and/or recommended home exercise program, have provided only minimal relief. The patient has not had an intra-articular injection in the past 3 months. The option  to continue with conservative measures in lieu of arthroplasty was discussed and offered. However, given the failure of these conservative measures and the clinical and radiographic evidence of end-stage arthritis, the patient is a good candidate for an elective total knee arthroplasty. The stated potential benefits include pain relief and improved function were discussed but no guarantees were offered. Some patients may experience improved range of motion but pre-operative range of motion remains the strongest predictor of post-operative range of motion.         PLAN:  I talked with the patient at length about risks, limitations, benefits and alternatives to total knee replacement today. I reviewed risks and concerns including but not limited to implant wear, loosening, implant failure, infection, need for revision surgery, delayed wound healing, deep vein thrombosis, pulmonary embolism, stroke, other cardiopulmonary event, nerve or vascular injury, death and other medical and anesthetic complications of surgery. We talked about the potential for persistent pain following surgery since there are many possible causes for knee pain. We talked about limited range of motion following knee replacement and the importance of physical therapy and their motivation. In rare cases, temporary but sometimes permanent nerve dysfunction can occur. The patient was advised that knee replacement will only relieve pain that is coming from the knee. I reviewed dislocation precautions and activity restrictions in detail. We discussed the concerns about intraoperative fracture and cemented versus cement-less implants.  We discussed the possible need for a blood transfusion. We discussed the fact that many of our patients are able to go home in 1 day or the same day depending on their health, mobility, pre-op preparation, individual home situation and personal preference. The patient should take our pre-operative teaching class. All of  the patients questions were answered. The patient can call my office to schedule surgery and the pre-op teaching class. I told the patient that they should contact their primary care physician to discuss fitness for surgery. The patient was also encouraged to get dental clearance prior to surgery.     The patient acknowledged a clear understanding of these issues and expressed the desire to proceed with surgery once medical clearance has been obtained.    The patient has identified their personal goals of their joint replacement surgery and recovery and we have discussed them. These are documented in our [employer] patient engagement platform. In addition, we have discussed the advantages and disadvantages of various implant and fixation options, as well as various surgical approaches. The basic concepts of the joint replacement procedure has been reviewed with the patient and the patient has been provided the opportunity to see an actual implant either in the office or in our pre-op education class.    I also discussed with the patient the risks of being in the hospital environment in the setting of COVID-19. This risk was considered in light of the overall risk and benefit discussion related to the surgery. The patient's symptoms are severe and worsening, and cause an inability to perform activities of daily living. All possible precautions will be taken and length of stay will be limited as much as possible. The patient is fully aware of this after complete discussion and would like to proceed.    The patient has the following comorbidities that increase the risk of infection following joint replacement surgery: obesity, diabetes. This was explicitly discussed with the patient and they would like to proceed with surgery.     Patient is pending a repeat hemoglobin A1c today.    Surgical plan: Left total knee arthroplasty   Implants: [employer]   Special equipment: None  DVT prophylaxis:  Eliquis 2.5 mg BID for 4  weeks   Drugs to stop: none  Allergies to antibiotics: none  Antibiotic Plan: Ancef (+/- vancomycin pending MRSA screen)  Special clearance needed: Per anesthesia team  Candidate for Outpatient: No. Overnight  Meds-to-beds: Yes  Pain medication post op: Standard: Oxycodone, Tramadol and Tylenol  DME Recommendations: [employer], Thigh high compression stocking, Walker or Crutches depending on patient preference     *This office note was dictated using Dragon voice to text software and was not proofread for spelling or grammatical errors *

## 2024-02-07 NOTE — CPM/PAT H&P
No results found for this or any previous visit (from the past 24 hour(s)).     Assessment & Plan:    63 y.o.  male  scheduled for L TKR on 2/23/24 with Dr. Plunkett for  OA.  He has failed conservative therapy.  His most recent hemoglobin A1c 7/3 10/6/23 c/w 3/24/23 7.8    Presents to [facility] today for perioperative risk stratification and optimization. He denies dysuria, hematuria, fevers, chills, and myalgias. Patient denies Cx pain, SOB, SPICER, and NVDC. Patient also denies Hx DVT/PE. Current medications were reviewed and a presurgical medication schedule was provided. He has no questions at this time.      Past Medical History:   Diagnosis Date    Anxiety disorder, unspecified 10/15/2014    Anxiety    Arthritis     Cellulitis of axilla, left 11/16/2023    Chronic pain disorder     COPD (chronic obstructive pulmonary disease) (CMS/HCC)     Diabetes mellitus (CMS/HCC)     Elevated prostate specific antigen (PSA)     Elevated prostate specific antigen (PSA)    GERD (gastroesophageal reflux disease)     Hyperlipidemia     Hypertension     Nephrolithiasis     Pain in left knee 08/25/2022    Acute pain of left knee    Pain in right elbow 01/04/2022    Right elbow pain    Personal history of diseases of the skin and subcutaneous tissue 10/07/2014    History of urticaria    Personal history of other diseases of the circulatory system 10/07/2014    History of hypertension    Personal history of other diseases of the musculoskeletal system and connective tissue 10/15/2014    Personal history of arthritis    Personal history of other diseases of the respiratory system 11/28/2016    History of sinusitis    Personal history of other endocrine, nutritional and metabolic disease     History of high cholesterol    Personal history of other mental and behavioral disorders 10/15/2014    History of depression    Personal history of other specified conditions 06/02/2015    History of fatigue    Pityriasis versicolor 05/18/2016     Tinea versicolor    Sleep apnea     Unilateral primary osteoarthritis, left knee 08/25/2022    Primary osteoarthritis of left knee       Past Surgical History:   Procedure Laterality Date    ADENOIDECTOMY  10/15/2014    Adenoidectomy    APPENDECTOMY  08/08/2016    Appendectomy    COLONOSCOPY  07/08/2013    Complete Colonoscopy    HERNIA REPAIR      OTHER SURGICAL HISTORY  08/08/2016    Knee Arthroscopy With Medial And Lateral Meniscus Repair    TONSILLECTOMY  10/15/2014    Tonsillectomy       Family History   Problem Relation Name Age of Onset    COPD Mother      Other (htn) Father      Diabetes Father           No Known Allergies    Current Outpatient Medications on File Prior to Visit   Medication Sig Dispense Refill    allopurinol (Zyloprim) 300 mg tablet TAKE 1 TABLET BY MOUTH EVERY DAY 90 tablet 0    amLODIPine (Norvasc) 10 mg tablet Take 1 tablet (10 mg) by mouth once daily. 90 tablet 0    budesonide-formoteroL (Symbicort) 160-4.5 mcg/actuation inhaler Inhale 2 puffs 2 times a day as needed.      co-enzyme Q-10 30 mg capsule Take 1 capsule (30 mg) by mouth once daily.      cyclobenzaprine (Flexeril) 10 mg tablet TAKE 1 TABLET BY MOUTH THREE TIMES A DAY AS NEEDED 90 tablet 0    fenofibrate (Tricor) 145 mg tablet Take 1 tablet (145 mg) by mouth once daily. Take with food. 90 tablet 0    fluticasone (Flonase) 50 mcg/actuation nasal spray Administer 1 spray into each nostril 2 times a day as needed.      folic acid (Folvite) 1 mg tablet Take 1 tablet (1 mg) by mouth once daily.      metFORMIN (Glucophage) 500 mg tablet Take 2 tablets (1,000 mg) by mouth 2 times a day. (Patient taking differently: Take 2 tablets (1,000 mg) by mouth 2 times a day. Patient takes 1 tablet in am, 2 tablet at dinner , and 1 tablet at bedtime) 360 tablet 0    omeprazole (PriLOSEC) 40 mg DR capsule TAKE 1 CAPSULE BY MOUTH EVERY DAY 90 capsule 0    rosuvastatin (Crestor) 40 mg tablet TAKE 1 TABLET BY MOUTH EVERY DAY 90 tablet 0    Vascepa  1 gram capsule TAKE 1 CAPSULE (1 G) BY MOUTH ONCE DAILY. 90 capsule 0    [DISCONTINUED] budesonide (Pulmicort Flexhaler) 180 mcg/actuation inhaler Inhale 2 times a day as needed.      OneTouch Delica Plus Lancet 30 gauge misc Apply 1 Lancet topically once daily. 100 each 0    traMADol (Ultram) 50 mg tablet Take 1 tablet (50 mg) by mouth once daily. PRN for pain (Patient not taking: Reported on 2/7/2024) 30 tablet 0    triamcinolone (Kenalog) 0.1 % ointment Triamcinolone Acetonide 0.1 % External Ointment   Quantity: 80  Refills: 0        Start : 20-Aug-2020   Active      [DISCONTINUED] allopurinoL 200 mg tablet Take 300 mg by mouth.      [DISCONTINUED] rosuvastatin (Crestor) 40 mg tablet TAKE 1 TABLET BY MOUTH EVERY DAY 90 tablet 0     No current facility-administered medications on file prior to visit.     Results for orders placed or performed in visit on 02/07/24 (from the past 96 hour(s))   ECG 12 Lead   Result Value Ref Range    Ventricular Rate 64 BPM    Atrial Rate 64 BPM    SD Interval 144 ms    QRS Duration 90 ms    QT Interval 410 ms    QTC Calculation(Bazett) 422 ms    P Axis 68 degrees    R Axis -3 degrees    T Axis 81 degrees    QRS Count 11 beats    Q Onset 226 ms    P Onset 154 ms    P Offset 205 ms    T Offset 431 ms    QTC Fredericia 418 ms   Basic Metabolic Panel   Result Value Ref Range    Glucose 166 (H) 74 - 99 mg/dL    Sodium 140 136 - 145 mmol/L    Potassium 4.5 3.5 - 5.3 mmol/L    Chloride 102 98 - 107 mmol/L    Bicarbonate 27 21 - 32 mmol/L    Anion Gap 16 10 - 20 mmol/L    Urea Nitrogen 19 6 - 23 mg/dL    Creatinine 0.99 0.50 - 1.30 mg/dL    eGFR 85 >60 mL/min/1.73m*2    Calcium 9.7 8.6 - 10.3 mg/dL   CBC and Auto Differential   Result Value Ref Range    WBC 7.9 4.4 - 11.3 x10*3/uL    nRBC      RBC 4.82 4.50 - 5.90 x10*6/uL    Hemoglobin 14.2 13.5 - 17.5 g/dL    Hematocrit 43.7 41.0 - 52.0 %    MCV 91 80 - 100 fL    MCH 29.5 26.0 - 34.0 pg    MCHC 32.5 32.0 - 36.0 g/dL    RDW 12.9 11.5 -  14.5 %    Platelets 208 150 - 450 x10*3/uL    Neutrophils % 74.7 40.0 - 80.0 %    Immature Granulocytes %, Automated 0.3 0.0 - 0.9 %    Lymphocytes % 18.9 13.0 - 44.0 %    Monocytes % 4.8 2.0 - 10.0 %    Eosinophils % 1.0 0.0 - 6.0 %    Basophils % 0.3 0.0 - 2.0 %    Neutrophils Absolute 5.89 1.20 - 7.70 x10*3/uL    Immature Granulocytes Absolute, Automated 0.02 0.00 - 0.70 x10*3/uL    Lymphocytes Absolute 1.49 1.20 - 4.80 x10*3/uL    Monocytes Absolute 0.38 0.10 - 1.00 x10*3/uL    Eosinophils Absolute 0.08 0.00 - 0.70 x10*3/uL    Basophils Absolute 0.02 0.00 - 0.10 x10*3/uL   Hemoglobin A1C   Result Value Ref Range    Hemoglobin A1C 7.6 (H) See below %    Estimated Average Glucose 171 Not Established mg/dL   Staphylococcus aureus/MRSA colonization, Culture    Specimen: Anterior Nares; Swab   Result Value Ref Range    Staph/MRSA Screen Culture No Staphylococcus aureus isolated            Review of Systems   Cardiovascular:  Positive for leg swelling.   Musculoskeletal:  Positive for back pain.   All other systems reviewed and are negative.     Physical Exam  Constitutional:       Appearance: Normal appearance. He is obese.   HENT:      Head: Normocephalic.      Nose: Nose normal.      Mouth/Throat:      Mouth: Mucous membranes are moist.      Pharynx: Oropharynx is clear.   Eyes:      Extraocular Movements: Extraocular movements intact.      Conjunctiva/sclera: Conjunctivae normal.      Pupils: Pupils are equal, round, and reactive to light.   Cardiovascular:      Rate and Rhythm: Normal rate and regular rhythm.      Pulses: Normal pulses.      Heart sounds: Normal heart sounds.      Comments: Bilateral LE 1-2+ pitting edema  Pulmonary:      Effort: Pulmonary effort is normal.      Breath sounds: Normal breath sounds.   Abdominal:      General: Bowel sounds are normal.      Palpations: Abdomen is soft.   Musculoskeletal:      Cervical back: Normal range of motion.   Skin:     General: Skin is warm and dry.       Capillary Refill: Capillary refill takes 2 to 3 seconds.   Neurological:      General: No focal deficit present.      Mental Status: He is alert and oriented to person, place, and time.   Psychiatric:         Mood and Affect: Mood normal.         Behavior: Behavior normal.        PAT AIRWAY:   Airway:     Mallampati::  IV    TM distance::  >3 FB    Neck ROM::  Full      Airway  Vitals:    02/07/24 0919   BP: 146/84   Pulse: 67   Resp: 18   Temp: 36.2 °C (97.1 °F)   SpO2: 97%      No results found for this or any previous visit (from the past 24 hour(s)).     Lab Results   Component Value Date    WBC 7.9 02/07/2024    WBC 9.4 03/24/2023    WBC 6.4 08/15/2022    HGB 14.2 02/07/2024    HGB 15.0 03/24/2023    HGB 15.0 08/15/2022    HCT 43.7 02/07/2024    HCT 47.4 03/24/2023    HCT 45.7 08/15/2022     02/07/2024     03/24/2023     08/15/2022     Lab Results   Component Value Date    GLUCOSE 166 (H) 02/07/2024    CALCIUM 9.7 02/07/2024     02/07/2024    K 4.5 02/07/2024    CO2 27 02/07/2024     02/07/2024    BUN 19 02/07/2024    CREATININE 0.99 02/07/2024     This SmartLink has not been configured with any valid records.     Lab Results   Component Value Date    HGBA1C 7.6 (H) 02/07/2024 2/7 Nares negative Staph    Neuro:  No diagnosis or significant findings on chart review or clinical presentation and evaluation.   Anxiety  Diabetic neuropathy  Neuropathic pain bilateral feet w paesthesia    HEENT/Airway:  No diagnosis or significant findings on chart review or clinical presentation and evaluation.     Cardiovascular:  Denies dizziness, chest pain, pressure, palpitations, SOB, lightheadedness, LE swelling  Recent CT lung screen severe coronary athersclerosis  H/O:  HTN  HPL  Amlodipine 10 mg every day  Fenobrate 145mg every day  Rosuvastatin 40 mg daily  Vascepa 1gm daily    METS: 7.01  RCRI: 0 points, 3.9%  risk for postoperative MACE   GABY: 0.6% risk for postoperative MACE  EKG -  NSR  c/w 10/3/2019 no change    Stress test 1/31/19  Normal stress myocardial perfusion imaging in response to pharmacologic stress.Well preserved ejection function.    Echo 1/31/2019  1. The left ventricular systolic function is normal with a 60-65% estimated ejection fraction.   2. Moderately increased left ventricular septal thickness.   3. Spectral Doppler shows an impaired relaxation pattern of left ventricular diastolic filling.   4. RVSP within normal limits.   5. Aortic valve stenosis is not present.   6. The pulmonary artery is not well visualized.    Pulmonary:  CHAD  COPD  Septal deviation  Symbicort 160-4.5 mcg as prescribed  CT lung 5/18/23:  IMPRESSION:  1. 2 mm right lower lobe nodule, likely benign. Continued screening with low-dose noncontrast chest CT in 12 months (from current date) is recommended.  2. Mild upper lung predominant emphysema.  3. Severe coronary artery calcification. Correlation with coronary  artery disease risk factors.  4. Diffuse hepatic steatosis. PCP evaluation is recommended.        ARISCAT: <26 points, 1.6% risk of in-hospital postoperative pulmonary complication  PRODIGY: Low risk for opioid induced respiratory depression  Discussed smoking cessation and deep breathing handout given    Renal:   No diagnosis or significant findings on chart review, or clinical presentation and evaluation.   Pt at Low risk for perioperative KALI based on Dynamic Predictive Scoring Tool for Perioperative KALI  CMP ordered     Endocrine:  Type II DM   10/2023 A1c 7.3%  Continue Metformin 1000mg bid  On 2/8/24 Dr Plunkett notified 2/7/24 A1c is 7.6    Hematologic:  Hgb 15.5 3/24/23  CBC ordered   Caprini Score 11points,, 10.7% patient at High risk for postoperative DVT. Pt supplied education/VTE handout    Gastrointestinal:   GERD  Omeprazole 40mg qd    Infectious disease:   No diagnosis or significant findings on chart review or clinical presentation and evaluation.   Prescription provided for  CHG body wash and dental rinse. CHG use instructions reviewed and provided to patient.  Staph screen collected    Musculoskeletal:   See HPI  Gout- allopurinal   DDD  Tramadol 50 mg prn   Spinal spondylosis- on flexiril    Skin  Psoriasis  Triamcinolone Acetonide 0.1 % External Ointment   Ciclopirox Olamine 0.77 % External Cream     Anesthesia:  No anesthesia complications  No broken teeth, no dentures and no missing teeth    [denies] dental issues  Current smoker 1.5 PPD x [4] yrs  [4] drinks per week  [no] Drug abuse  [Denies] personal/family issues with Anesthesia    No nickel, shell fish, or iodine allergies    ASA II  RCRI -   CATHY - points Risk for CHAD   NSQIP - Predicted length of stay days 0-1  ARISCAT - 3 points Low Risk 1.6%  DASI 42.7 Points 7.99 Mets  GABY - 0.1%  JHFRAT - points risk for falls    Labs & Imaging ordered:  CBC, CMP, MRSA, A1c, EKG    Overall, patient at Moderate risk for the scheduled surgery. Discussed with patient medication instructions, NPO guidelines, and any questions or concerns. Patient needs further workup prior to preceding with elective surgery, based on   DM, COPD, Lung scan CT shows severe coronary atherosclerosis    Clearance -   2/8/24 Cardiac Dr East   2/14/24 Stress -test small area of ischemia apical lateral wall , Nl. LF    2/15 Carotid US R and L ICA 20-49%    2/15 Echo-EF 65-70%, LVH w diast. Dysfunction, Nl. RV size and funct., sclerotic AV, mild MR, Mild to mod. TR, mild pulm. HTN     2/20 see Dr. East    PAT Testing - CBC, CMP, [id number] PCR, EKG    CHLORHEXIDINE .12% DENTAL RINSE E PRESCIRBED PER  INFECTION PREVENTION PROTOCOL. PATIENT EDUCATED   Patient advised to call [facility] if does not receive Mouthwash    Education:  Known and treated  CHAD- Patient was instructed to bring CPAP machine the morning of surgery. Recommend prioritizing  nonopioid analgesic techniques (regional and local anesthesia, nonsteroidal medications, etc) before the  administration of opioids and close monitoring for hypoventilation after surgery due to CHAD. Increased vigilance is recommended with the use of narcotics due to an increased risk for opioid induced respiratory depression    Patient has history of nicotine dependency. Smoking cessation education was provided to the patient.Cessation encouraged. - Physiological and physical aspects of tobacco addiction as well as strategies for quitting were discussed. - Counseling was given focusing on the harmful effects of this addiction especially given the patient's medical condition(s) which will be worsened because of the chemicals in tobacco    Face to Face patient contact time 45    JOE Ivy 2/7/2024 9:42 AM

## 2024-02-07 NOTE — PREPROCEDURE INSTRUCTIONS
Medication List            Accurate as of February 7, 2024  9:17 AM. Always use your most recent med list.                allopurinol 300 mg tablet  Commonly known as: Zyloprim  TAKE 1 TABLET BY MOUTH EVERY DAY  Medication Adjustments for Surgery: Continue until night before surgery     amLODIPine 10 mg tablet  Commonly known as: Norvasc  Take 1 tablet (10 mg) by mouth once daily.  Medication Adjustments for Surgery: Take morning of surgery with sip of water, no other fluids     chlorhexidine 0.12 % solution  Commonly known as: Peridex  Use 15 mL in the mouth or throat once daily for 2 doses.  Medication Adjustments for Surgery: Other (Comment)  Notes to patient: As directed     co-enzyme Q-10 30 mg capsule  Medication Adjustments for Surgery: Stop 7 days before surgery     cyclobenzaprine 10 mg tablet  Commonly known as: Flexeril  TAKE 1 TABLET BY MOUTH THREE TIMES A DAY AS NEEDED  Medication Adjustments for Surgery: Continue until night before surgery     fenofibrate 145 mg tablet  Commonly known as: Tricor  Take 1 tablet (145 mg) by mouth once daily. Take with food.  Medication Adjustments for Surgery: Stop 1 day before surgery     fluticasone 50 mcg/actuation nasal spray  Commonly known as: Flonase  Medication Adjustments for Surgery: Continue until night before surgery     folic acid 1 mg tablet  Commonly known as: Folvite  Medication Adjustments for Surgery: Stop 7 days before surgery     metFORMIN 500 mg tablet  Commonly known as: Glucophage  Take 2 tablets (1,000 mg) by mouth 2 times a day.  Medication Adjustments for Surgery: Continue until night before surgery     omeprazole 40 mg DR capsule  Commonly known as: PriLOSEC  TAKE 1 CAPSULE BY MOUTH EVERY DAY  Medication Adjustments for Surgery: Continue until night before surgery     OneTouch Delica Plus Lancet 30 gauge misc  Generic drug: lancets  Apply 1 Lancet topically once daily.     rosuvastatin 40 mg tablet  Commonly known as: Crestor  TAKE 1 TABLET  BY MOUTH EVERY DAY  Medication Adjustments for Surgery: Continue until night before surgery     Symbicort 160-4.5 mcg/actuation inhaler  Generic drug: budesonide-formoteroL  Medication Adjustments for Surgery: Other (Comment)  Notes to patient: As needed     traMADol 50 mg tablet  Commonly known as: Ultram  Take 1 tablet (50 mg) by mouth once daily. PRN for pain  Medication Adjustments for Surgery: Continue until night before surgery     triamcinolone 0.1 % ointment  Commonly known as: Kenalog     Vascepa 1 gram capsule  Generic drug: icosapent ethyL  TAKE 1 CAPSULE (1 G) BY MOUTH ONCE DAILY.  Medication Adjustments for Surgery: Stop 7 days before surgery                              NPO Instructions:    Do not eat any food after midnight the night before your surgery/procedure.    Additional Instructions:     Seven/Six Days before Surgery:  Review your medication instructions, stop indicated medications  Five Days before Surgery:  Review your medication instructions, stop indicated medications  Three Days before Surgery:  Review your medication instructions, stop indicated medications  The Day before Surgery:  Review your medication instructions, stop indicated medications  You will be contacted regarding the time of your arrival to facility and surgery time  Do not eat any food after Midnight  Day of Surgery:  Review your medication instructions, take indicated medications  If you have diabetes, please check your fasting blood sugar upon awakening.  If fasting blood sugar is <80 mg/dl, drink 100 ml of apple juice, time limit of 2 hours before  Wear  comfortable loose fitting clothing  Do not use moisturizers, creams, lotions or perfume  All jewelry and valuables should be left at home

## 2024-02-08 LAB
ATRIAL RATE: 64 BPM
P AXIS: 68 DEGREES
P OFFSET: 205 MS
P ONSET: 154 MS
PR INTERVAL: 144 MS
Q ONSET: 226 MS
QRS COUNT: 11 BEATS
QRS DURATION: 90 MS
QT INTERVAL: 410 MS
QTC CALCULATION(BAZETT): 422 MS
QTC FREDERICIA: 418 MS
R AXIS: -3 DEGREES
T AXIS: 81 DEGREES
T OFFSET: 431 MS
VENTRICULAR RATE: 64 BPM

## 2024-02-09 LAB — STAPHYLOCOCCUS SPEC CULT: NORMAL

## 2024-02-09 NOTE — PROGRESS NOTES
Thank you for attending our Joint Replacement class today in preparation for your upcoming surgery.  Topics discussed include:    MyChart Enrollment  Communication with Care Team  My Chart is the best form of communication to reach all of your caregivers  You can send messages to specific care givers, or a care team  Continued Education  You will be enrolled in a Total Joint Replacement care plan to receive additional education before and after surgery  You can review a short recording of the class content  Access to Medical Records  You can access test results, office notes, appointments, etc.  You can connect to other healthcare systems who use [employer] ([facility], [facility], [facility], etc.)  [facility]  Program Information  Consent to Enroll    Background/Understanding of Joint Replacement Surgery  Potential for same day discharge  Any questions or concerns to be directed to the surgeon's office    How to Prepare for Surgery  Use of Nicotine Products/Smoking  Stop several weeks before surgery  Such products slow down the healing process and increase risk of post-op infection and complications  Clearance for Surgery  Medical Clearance by Specialists  Dental Clearance  Cracked/Broken/Loose teeth left untreated may postpone surgery  The importance of post-op antibiotics for dental visits per surgeon protocol  Preadmission Testing  **Potential for postponed surgery if appropriate clearance is not obtained  Medication Instruction  Follow instructions provided by the doctor who prescribes your medication (typically, but not limited to cardiologist)  Preadmission testing will provide additional instructions during your appointment on what to stop and what to take as you get closer to surgery  For clarification of these instructions, please call preadmission testing directly - [phone]  Tips for Preparing the home for discharge from the hospital  Care Partner  Requirement for surgery, the patient must have a plan to have  help at home  Potential for postponed surgery if plan for home support cannot be established  How the care partner can help after surgery  CHG Body Wash/Mouth Wash  Follow the instructions given at preadmission testing  Body wash is to be used on the body and hair for 5 washes  Mouthwash is to be used the night before and morning of surgery  **This is a system-wide protocol developed by infectious disease professionals, we will not alter our recommendations for those with sensitive skin or those who have special hair needs.  Please follow the instructions as they are written as this will provide the best infection prevention measures for surgery.  Should you have an allergy to one of the products, please discuss with your preadmission team**    What to Expect in the Hospital/At Home  Morning of Surgery NPO Guidelines  Nothing to eat after midnight  Water can be consumed up to 2 hours prior to arrival  Surgical and Post-Surgical Care Team  Surgical Team  Anesthesia Team  Nursing  Physical Therapy  Care Coordinating  Pharmacy  Hospital Arrival Instructions  Arrive at the time provided to you  Consider traffic patterns (rush-hour) based on arrival time  Have arrangements made for a ride home  If discharging same day, care partner should remain at the hospital  Recovering after Surgery  Recovery Room - Visitors are not brought back  Transition to hospital room - 2nd Floor, Visitors will be directed to your room  The presence of and strategies for controlling surgical pain and swelling  The importance of early mobility  Side effects after surgery  What to expect if staying overnight    Discharge Planning  The intended plan for discharge will be for patients to discharge home  All patients require a care partner (family, friend, neighbor, etc.) to stay with the patient for the first few nights after surgery  The inability to secure help at home will postpone surgery  Home Care Services set up per surgeon order  Physical  Therapy  Occupational Therapy  **If desired, private duty care can be arranged by the patient ahead of time**  Outpatient Physical Therapy per surgeon order    Recovering at Home  Wound Care  Follow wound care instructions found in your discharge paperwork  Bandage is water-resistant and you may shower with the bandage  Do not scrub directly over the bandage  Do not submerge in water until cleared (bathtub, hot tub, pool, etc.)    Post-Op Risk Prevention  Infection Prevention  Promptly seek treatment for any infections post-operatively  Routine dental visits must be postponed for 3 months after surgery  Your surgeon may require antibiotics prior to future dental visits  Any concerns for infection not related directly to the knee or the hip should be managed by your primary care provider  Blood Clots  Be sure to complete the course of blood thinning medication as prescribed by your surgeon  Movement every 1-2 hours during the day is encouraged to prevent blood clots  Monitor for signs of blood clots  Wear compression stockings as prescribed by your surgeon  Constipation  Constipation is common following surgery  Drink plenty of fluids  Take stool softener/laxative as prescribed by your surgeon  Move around frequently  Eat foods high in fiber  Fall Prevention  Prepare home ahead of time to clear space to move with walker  Remove throw rugs and electrical cords from walkways  Install railings near any stairways with more than 2 steps  Use night lights for increased visibility at night  Continue to use your assistive device until cleared by surgeon or physical therapy  Dislocation Prevention - Not all procedures will have dislocation precautions  Follow dislocation precautions provided by your surgeon  It is OK to resume sexual activity about 6 weeks following surgery  Be sure to follow any dislocation precautions assigned    Durable Medical Equipment  Cold Therapy  Breg Cold Therapy Machines  Ice/Gel Packs  Assistive  Devices  Folding Walker with Wheels (in the front only)  No Rollators  Crutches if approved by Physical Therapy and Surgeon after surgery  Hip Kits  Raised Toilet Seats  Additional Compression Stockings    Joint Preservation  Healthy Activities when Cleared  Walking  Swimming  Bike Riding  Activities to Avoid  Refrain from repetitive motions which have a high impact on the joint  Gradual Progression  Progress activity slowly, listen to your body  Common Findings - NORMAL after surgery  Clicking/Grinding  Numbness near incision    Physical Therapy  Prehabilitation exercises  START TODAY ON BOTH LEGS  Surgery Specific Precautions  Follow surgery specific precautions found in your discharge paperwork    Follow-Up Visit  All patients will see their surgeon for a follow up visit after surgery  The visit may range from 2-6 weeks after surgery and is surgeon specific      Please don't hesitate to reach out if you have any additional questions or concerns.    Rashmi Ruth MBA, BSN, RN-BC  KENNETH AmatoN, RN  [facility] Navigators  [facility]   [phone]

## 2024-02-10 DIAGNOSIS — R53.83 FATIGUE, UNSPECIFIED TYPE: ICD-10-CM

## 2024-02-12 ENCOUNTER — EDUCATION (OUTPATIENT)
Dept: ORTHOPEDIC SURGERY | Facility: HOSPITAL | Age: 64
End: 2024-02-12
Payer: COMMERCIAL

## 2024-02-12 RX ORDER — FOLIC ACID 1 MG/1
1 TABLET ORAL DAILY
Qty: 90 TABLET | Refills: 0 | Status: SHIPPED | OUTPATIENT
Start: 2024-02-12 | End: 2024-05-09

## 2024-02-12 ASSESSMENT — KOOS JR
KOOS JR SCORING: 34.17
HOW SEVERE IS YOUR KNEE STIFFNESS AFTER FIRST WAKING IN MORNING: SEVERE
BENDING TO THE FLOOR TO PICK UP OBJECT: SEVERE
TWISING OR PIVOTING ON KNEE: SEVERE
STRAIGHTENING KNEE FULLY: MODERATE
RISING FROM SITTING: SEVERE
GOING UP OR DOWN STAIRS: EXTREME
STANDING UPRIGHT: SEVERE

## 2024-02-20 ENCOUNTER — OFFICE VISIT (OUTPATIENT)
Dept: PRIMARY CARE | Facility: CLINIC | Age: 64
End: 2024-02-20
Payer: COMMERCIAL

## 2024-02-20 VITALS
BODY MASS INDEX: 35.06 KG/M2 | DIASTOLIC BLOOD PRESSURE: 70 MMHG | RESPIRATION RATE: 16 BRPM | HEART RATE: 72 BPM | HEIGHT: 75 IN | WEIGHT: 282 LBS | SYSTOLIC BLOOD PRESSURE: 120 MMHG

## 2024-02-20 DIAGNOSIS — K75.81 NONALCOHOLIC STEATOHEPATITIS (NASH): ICD-10-CM

## 2024-02-20 DIAGNOSIS — F17.210 NICOTINE DEPENDENCE, CIGARETTES, UNCOMPLICATED: ICD-10-CM

## 2024-02-20 DIAGNOSIS — K21.9 GASTROESOPHAGEAL REFLUX DISEASE WITHOUT ESOPHAGITIS: ICD-10-CM

## 2024-02-20 DIAGNOSIS — G47.33 OBSTRUCTIVE SLEEP APNEA SYNDROME: ICD-10-CM

## 2024-02-20 DIAGNOSIS — N40.0 BENIGN PROSTATIC HYPERPLASIA WITHOUT LOWER URINARY TRACT SYMPTOMS: ICD-10-CM

## 2024-02-20 DIAGNOSIS — J44.0 COPD WITH ACUTE BRONCHITIS (MULTI): ICD-10-CM

## 2024-02-20 DIAGNOSIS — R53.81 MALAISE AND FATIGUE: ICD-10-CM

## 2024-02-20 DIAGNOSIS — E11.9 TYPE 2 DIABETES MELLITUS WITHOUT COMPLICATION, WITHOUT LONG-TERM CURRENT USE OF INSULIN (MULTI): Primary | ICD-10-CM

## 2024-02-20 DIAGNOSIS — E03.9 ACQUIRED HYPOTHYROIDISM: ICD-10-CM

## 2024-02-20 DIAGNOSIS — E78.49 OTHER HYPERLIPIDEMIA: ICD-10-CM

## 2024-02-20 DIAGNOSIS — I10 BENIGN ESSENTIAL HYPERTENSION: ICD-10-CM

## 2024-02-20 DIAGNOSIS — J20.9 COPD WITH ACUTE BRONCHITIS (MULTI): ICD-10-CM

## 2024-02-20 DIAGNOSIS — R53.83 MALAISE AND FATIGUE: ICD-10-CM

## 2024-02-20 DIAGNOSIS — I25.10 ARTERIOSCLEROSIS OF CORONARY ARTERY: ICD-10-CM

## 2024-02-20 PROBLEM — M77.00 MEDIAL EPICONDYLITIS OF ELBOW: Status: ACTIVE | Noted: 2024-02-20

## 2024-02-20 PROBLEM — L28.2 PRURITIC RASH: Status: RESOLVED | Noted: 2024-02-20 | Resolved: 2024-02-20

## 2024-02-20 PROBLEM — B02.8 HERPES ZOSTER WITH COMPLICATION: Status: RESOLVED | Noted: 2024-02-20 | Resolved: 2024-02-20

## 2024-02-20 PROBLEM — Z86.39 HISTORY OF METABOLIC DISORDER: Status: RESOLVED | Noted: 2024-02-20 | Resolved: 2024-02-20

## 2024-02-20 PROBLEM — Z86.59 HISTORY OF DEPRESSION: Status: RESOLVED | Noted: 2024-02-20 | Resolved: 2024-02-20

## 2024-02-20 PROBLEM — L50.9 URTICARIA: Status: ACTIVE | Noted: 2024-02-20

## 2024-02-20 PROBLEM — R05.9 COUGH: Status: RESOLVED | Noted: 2024-02-20 | Resolved: 2024-02-20

## 2024-02-20 PROBLEM — R10.9 ACUTE ABDOMINAL PAIN: Status: RESOLVED | Noted: 2024-02-20 | Resolved: 2024-02-20

## 2024-02-20 PROBLEM — R23.4 CHANGES IN SKIN TEXTURE: Status: RESOLVED | Noted: 2024-02-20 | Resolved: 2024-02-20

## 2024-02-20 PROBLEM — S92.919A FRACTURE OF PHALANX OF TOE: Status: RESOLVED | Noted: 2024-02-20 | Resolved: 2024-02-20

## 2024-02-20 PROBLEM — D22.9 MELANOCYTIC NEVUS: Status: RESOLVED | Noted: 2024-02-20 | Resolved: 2024-02-20

## 2024-02-20 PROBLEM — K80.20 CHOLELITHIASIS WITHOUT OBSTRUCTION: Status: ACTIVE | Noted: 2024-02-20

## 2024-02-20 PROBLEM — H65.90 ALLERGIC OTITIS MEDIA: Status: RESOLVED | Noted: 2024-02-20 | Resolved: 2024-02-20

## 2024-02-20 PROBLEM — M77.12 LATERAL EPICONDYLITIS OF LEFT ELBOW: Status: ACTIVE | Noted: 2024-02-20

## 2024-02-20 PROBLEM — R07.89 CHEST DISCOMFORT: Status: RESOLVED | Noted: 2024-02-20 | Resolved: 2024-02-20

## 2024-02-20 PROBLEM — R35.0 INCREASED FREQUENCY OF URINATION: Status: RESOLVED | Noted: 2024-02-20 | Resolved: 2024-02-20

## 2024-02-20 PROBLEM — H52.13 MYOPIA OF BOTH EYES: Status: RESOLVED | Noted: 2024-02-20 | Resolved: 2024-02-20

## 2024-02-20 PROBLEM — R21 MACULOPAPULAR RASH: Status: ACTIVE | Noted: 2024-02-20

## 2024-02-20 PROCEDURE — 4004F PT TOBACCO SCREEN RCVD TLK: CPT | Performed by: INTERNAL MEDICINE

## 2024-02-20 PROCEDURE — 99214 OFFICE O/P EST MOD 30 MIN: CPT | Performed by: INTERNAL MEDICINE

## 2024-02-20 PROCEDURE — 3078F DIAST BP <80 MM HG: CPT | Performed by: INTERNAL MEDICINE

## 2024-02-20 PROCEDURE — 3074F SYST BP LT 130 MM HG: CPT | Performed by: INTERNAL MEDICINE

## 2024-02-20 PROCEDURE — 3051F HG A1C>EQUAL 7.0%<8.0%: CPT | Performed by: INTERNAL MEDICINE

## 2024-02-20 ASSESSMENT — ENCOUNTER SYMPTOMS
RESPIRATORY NEGATIVE: 1
NEUROLOGICAL NEGATIVE: 1
GASTROINTESTINAL NEGATIVE: 1
MUSCULOSKELETAL NEGATIVE: 1
EYES NEGATIVE: 1
ENDOCRINE NEGATIVE: 1
HEMATOLOGIC/LYMPHATIC NEGATIVE: 1
PSYCHIATRIC NEGATIVE: 1
CONSTITUTIONAL NEGATIVE: 1
CARDIOVASCULAR NEGATIVE: 1
ALLERGIC/IMMUNOLOGIC NEGATIVE: 1

## 2024-02-20 NOTE — ASSESSMENT & PLAN NOTE
COPD - Educate tobacco cessation extensively , no wheezing, no SOB, no Crackles heard/ Exacerbation. Get CXR. Continues mild exercises and inhalers. [place] preventive care and vaccinations. Add advair inhalers and spiriva inhaler.

## 2024-02-20 NOTE — PROGRESS NOTES
"Subjective   Patient ID: Hammad Blackburn is a 63 y.o. male who presents for Follow-up (3 month follow up). Follows up for DIABETES AND hypertension  AND HIGH CHOLESTEROL     HPI     Review of Systems   Constitutional: Negative.    HENT: Negative.     Eyes: Negative.    Respiratory: Negative.     Cardiovascular: Negative.    Gastrointestinal: Negative.    Endocrine: Negative.    Musculoskeletal: Negative.    Skin: Negative.    Allergic/Immunologic: Negative.    Neurological: Negative.    Hematological: Negative.    Psychiatric/Behavioral: Negative.     All other systems reviewed and are negative.      Objective   Ht 1.905 m (6' 3\")   Wt 128 kg (282 lb)   BMI 35.25 kg/m²   Blood pressure 120/70, pulse 72, resp. rate 16, height 1.905 m (6' 3\"), weight 128 kg (282 lb).   Physical Exam  Vitals and nursing note reviewed.   Constitutional:       Appearance: Normal appearance.   HENT:      Head: Normocephalic and atraumatic.      Right Ear: Tympanic membrane, ear canal and external ear normal.      Left Ear: Tympanic membrane, ear canal and external ear normal. There is no impacted cerumen.      Nose: Nose normal.      Mouth/Throat:      Mouth: Mucous membranes are moist.      Pharynx: Oropharynx is clear.   Eyes:      Extraocular Movements: Extraocular movements intact.      Conjunctiva/sclera: Conjunctivae normal.      Pupils: Pupils are equal, round, and reactive to light.   Cardiovascular:      Rate and Rhythm: Normal rate and regular rhythm.      Pulses: Normal pulses.      Heart sounds: Normal heart sounds. No murmur heard.  Pulmonary:      Effort: Pulmonary effort is normal. No respiratory distress.      Breath sounds: Normal breath sounds. No stridor. No wheezing, rhonchi or rales.   Chest:      Chest wall: No tenderness.   Abdominal:      General: Abdomen is flat. Bowel sounds are normal. There is no distension.      Palpations: Abdomen is soft. There is no mass.      Tenderness: There is no abdominal tenderness. " There is no right CVA tenderness, left CVA tenderness, guarding or rebound.      Hernia: No hernia is present.   Musculoskeletal:         General: Normal range of motion.      Cervical back: Normal range of motion and neck supple.   Skin:     General: Skin is warm.      Capillary Refill: Capillary refill takes less than 2 seconds.   Neurological:      General: No focal deficit present.      Mental Status: He is alert.      Cranial Nerves: No cranial nerve deficit.      Sensory: No sensory deficit.      Motor: No weakness.      Coordination: Coordination normal.      Gait: Gait normal.      Deep Tendon Reflexes: Reflexes normal.   Psychiatric:         Mood and Affect: Mood normal.         Behavior: Behavior normal. Behavior is cooperative.         Thought Content: Thought content normal.         Judgment: Judgment normal.         Assessment/Plan   Problem List Items Addressed This Visit             ICD-10-CM    Benign essential hypertension I10    COPD with acute bronchitis (CMS/[facility]) J44.0, J20.9     COPD - Educate tobacco cessation extensively , no wheezing, no SOB, no Crackles heard/ Exacerbation. Get CXR. Continues mild exercises and inhalers. [place] preventive care and vaccinations. Add advair inhalers and spiriva inhaler.          Esophageal reflux K21.9    Diabetes (CMS/[facility]) - Primary E11.9    Hyperlipidemia E78.5    Malaise and fatigue R53.81, R53.83    Nicotine dependence, cigarettes, uncomplicated F17.210     Tobacco cessation - Educate risks of smoking (CAD/COPD/CANCER of the lung or urinary bladder/CVA). Educate life style changes and prescribe nicotine patch and Wellbutrin 150mg BID. Educate stress reduction.              Obstructive sleep apnea syndrome G47.33    Benign prostatic hyperplasia without lower urinary tract symptoms N40.0    Acquired hypothyroidism E03.9    Arteriosclerosis of coronary artery I25.10    Nonalcoholic steatohepatitis (CELIS) K75.81       Benign essential hypertension I10         HTN - hypertension well/controlled .Target BP < 130/80  achieved. Educate low salt diet and exercise with weight loss. Educate home self monitoring and diary keeping. Educate risks of elevate blood pressure and benefits of prompt treatment.  Refill Amlodipine            COPD with acute bronchitis (CMS/[facility]) J44.0, J20.9       COPD - Educate tobacco cessation extensively , no wheezing, no SOB, no Crackles heard/ Exacerbation.         Get CXR.  Continues mild exercises and inhalers.  [place] preventive care and vaccinations.                                       Add advair inhalers and spiriva inhaler.             Esophageal reflux K21.9       GERD - Acid reflux disease. Rx. PPI (Prilosec/Prevacid/Protonix/Nexium) and educate diet and life style changes. Referred patient to an endoscopy (EGD) and check H. Pylori titers.            Hyperlipidemia E78.5       Hypercholesterolemia - Monitor lipid profile and educate patient upon risks of high cholesterol and targets. Educate diet and change in lifestyle and increase in exercises - Refill: Fenofibrate and  Rosuvastatin and Vascepa  and educate compliance with medication and diet.             Malaise and fatigue R53.81, R53.83       Fatigue - check CMP(metabolic panel and elctrolytes) , CBC(complete blood cell count), TSH(thyroid function). Insomnia may play a role and sleep studies(rule out sleep apnea) are recommended . Educate sleep hygiene. Consider anxiety disorder vs. depression. Consider Stress test, and 2DECHO.             Lumbosacral spondylosis M47.817       DDD - Degenerative disc disease of the Lumbo-Sacral (LS)/spine. Educate exercises and referred patient to Physical Therapy (PT). Ordered X-Ray's of the LS/ spine. Consider MRI. Radiculopathy in the distribution of L4-L5-S1/ nerve roots, needs NSAIDS (Naprosin 500mg BID vs. Arthrotec 75mg BID or Prednisone taper (Medrol dose pack), Flexeril 10 mg qHS, heat application, and pain control with Tylenol 325 mg  TID.              Lumbar radiculopathy, chronic M54.16       DDD - Degenerative disc disease of the Lumbo-Sacral (LS)/spine. Educate exercises and referred patient to Physical Therapy (PT). Ordered X-Ray's of the LS/ spine. Consider MRI. Radiculopathy in the distribution of L4-L5-S1/ nerve roots, needs NSAIDS (Naprosin 500mg BID vs. Arthrotec 75mg BID or Prednisone taper (Medrol dose pack), Flexeril 10 mg qHS, heat application, and pain control with Tylenol 325 mg TID.             Nicotine dependence, cigarettes, uncomplicated F17.210       Tobacco cessation - Educate risks of smoking (CAD/COPD/CANCER of the lung or urinary bladder/CVA). Educate life style changes and prescribe nicotine patch and Wellbutrin 150mg BID. Educate stress reduction.                                                                                        Type 2 diabetes mellitus without complications (CMS/[facility]) E11.9       DM - NIDDM  . Reviewed with patient / Will check  HbA1c and fasting blood sugars. Educate home self monitoring and diary keeping(reviewed with patient home blood sugar levels /diary). Educate extensively low calorie diet and weight loss with exercise. Reviewed BS- diary and Rx. Regimen. [place] renal protection with ARB/ACEI.               Educate compliance with diet and Rx. And educate risks and autcomes.                                                   Generalized pain R52      Other Visit Diagnoses           Codes     Lumbar and sacral spondylarthritis    -  Primary M47.817     Relevant Medications     predniSONE (Deltasone) 10 mg tablet     Other Relevant Orders     Referral to Spine Surgery     Referral to Pain Medicine                            Immunizations/Injections      very important  Immunizations from outside sources need reconciliation.      Influenza, High Dose Seasonal, Preservative Free10/19/2021  Influenza, Igzalkraehk79/6/2017  Influenza, seasonal, [id number]/3/2018  Pfizer COVID-19 vaccine, Fall  2023, 12 years and older, (30mcg/0.3mL)10/9/2023  Pfizer Purple Cap SARS-CoV-210/13/2021, 4/8/2021, 3/16/2021

## 2024-02-27 DIAGNOSIS — Z00.00 ENCOUNTER FOR GENERAL ADULT MEDICAL EXAMINATION WITHOUT ABNORMAL FINDINGS: ICD-10-CM

## 2024-02-27 RX ORDER — ALLOPURINOL 300 MG/1
300 TABLET ORAL DAILY
Qty: 90 TABLET | Refills: 0 | Status: SHIPPED | OUTPATIENT
Start: 2024-02-27 | End: 2024-06-03

## 2024-02-29 ENCOUNTER — OFFICE VISIT (OUTPATIENT)
Dept: PRIMARY CARE | Facility: CLINIC | Age: 64
End: 2024-02-29
Payer: COMMERCIAL

## 2024-02-29 VITALS
BODY MASS INDEX: 35.06 KG/M2 | WEIGHT: 282 LBS | HEART RATE: 78 BPM | DIASTOLIC BLOOD PRESSURE: 80 MMHG | RESPIRATION RATE: 16 BRPM | HEIGHT: 75 IN | SYSTOLIC BLOOD PRESSURE: 130 MMHG

## 2024-02-29 DIAGNOSIS — E78.49 OTHER HYPERLIPIDEMIA: ICD-10-CM

## 2024-02-29 DIAGNOSIS — I25.10 ARTERIOSCLEROSIS OF CORONARY ARTERY: Primary | ICD-10-CM

## 2024-02-29 DIAGNOSIS — I10 BENIGN ESSENTIAL HYPERTENSION: ICD-10-CM

## 2024-02-29 DIAGNOSIS — E11.9 TYPE 2 DIABETES MELLITUS WITHOUT COMPLICATION, WITHOUT LONG-TERM CURRENT USE OF INSULIN (MULTI): ICD-10-CM

## 2024-02-29 PROCEDURE — 3051F HG A1C>EQUAL 7.0%<8.0%: CPT | Performed by: INTERNAL MEDICINE

## 2024-02-29 PROCEDURE — 3079F DIAST BP 80-89 MM HG: CPT | Performed by: INTERNAL MEDICINE

## 2024-02-29 PROCEDURE — 99213 OFFICE O/P EST LOW 20 MIN: CPT | Performed by: INTERNAL MEDICINE

## 2024-02-29 PROCEDURE — 4004F PT TOBACCO SCREEN RCVD TLK: CPT | Performed by: INTERNAL MEDICINE

## 2024-02-29 PROCEDURE — 3075F SYST BP GE 130 - 139MM HG: CPT | Performed by: INTERNAL MEDICINE

## 2024-02-29 ASSESSMENT — ENCOUNTER SYMPTOMS
ALLERGIC/IMMUNOLOGIC NEGATIVE: 1
EYES NEGATIVE: 1
MUSCULOSKELETAL NEGATIVE: 1
CONSTITUTIONAL NEGATIVE: 1
CARDIOVASCULAR NEGATIVE: 1
GASTROINTESTINAL NEGATIVE: 1
HEMATOLOGIC/LYMPHATIC NEGATIVE: 1
NEUROLOGICAL NEGATIVE: 1
PSYCHIATRIC NEGATIVE: 1
RESPIRATORY NEGATIVE: 1
ENDOCRINE NEGATIVE: 1

## 2024-02-29 NOTE — PROGRESS NOTES
"Subjective   Patient ID: Hammad Blackburn is a 63 y.o. male who presents for Follow-up (Follow up on stress test ).    HPI     Review of Systems   Constitutional: Negative.    HENT: Negative.     Eyes: Negative.    Respiratory: Negative.     Cardiovascular: Negative.    Gastrointestinal: Negative.    Endocrine: Negative.    Musculoskeletal: Negative.    Skin: Negative.    Allergic/Immunologic: Negative.    Neurological: Negative.    Hematological: Negative.    Psychiatric/Behavioral: Negative.     All other systems reviewed and are negative.      Objective   Ht 1.905 m (6' 3\")   Wt 128 kg (282 lb)   BMI 35.25 kg/m²   Blood pressure 130/80, pulse 78, resp. rate 16, height 1.905 m (6' 3\"), weight 128 kg (282 lb).   Physical Exam  Vitals and nursing note reviewed.   Constitutional:       Appearance: Normal appearance.   HENT:      Head: Normocephalic and atraumatic.      Right Ear: Tympanic membrane, ear canal and external ear normal.      Left Ear: Tympanic membrane, ear canal and external ear normal. There is no impacted cerumen.      Nose: Nose normal.      Mouth/Throat:      Mouth: Mucous membranes are moist.      Pharynx: Oropharynx is clear.   Eyes:      Extraocular Movements: Extraocular movements intact.      Conjunctiva/sclera: Conjunctivae normal.      Pupils: Pupils are equal, round, and reactive to light.   Cardiovascular:      Rate and Rhythm: Normal rate and regular rhythm.      Pulses: Normal pulses.      Heart sounds: Normal heart sounds. No murmur heard.  Pulmonary:      Effort: Pulmonary effort is normal. No respiratory distress.      Breath sounds: Normal breath sounds. No stridor. No wheezing, rhonchi or rales.   Chest:      Chest wall: No tenderness.   Abdominal:      General: Abdomen is flat. Bowel sounds are normal. There is no distension.      Palpations: Abdomen is soft. There is no mass.      Tenderness: There is no abdominal tenderness. There is no right CVA tenderness, left CVA tenderness, " guarding or rebound.      Hernia: No hernia is present.   Musculoskeletal:         General: Normal range of motion.      Cervical back: Normal range of motion and neck supple.   Skin:     General: Skin is warm.      Capillary Refill: Capillary refill takes less than 2 seconds.   Neurological:      General: No focal deficit present.      Mental Status: He is alert.      Cranial Nerves: No cranial nerve deficit.      Sensory: No sensory deficit.      Motor: No weakness.      Coordination: Coordination normal.      Gait: Gait normal.      Deep Tendon Reflexes: Reflexes normal.   Psychiatric:         Mood and Affect: Mood normal.         Behavior: Behavior normal. Behavior is cooperative.         Thought Content: Thought content normal.         Judgment: Judgment normal.         Assessment/Plan   Problem List Items Addressed This Visit             ICD-10-CM    Benign essential hypertension I10    Hyperlipidemia E78.5    Type 2 diabetes mellitus without complications (CMS/[facility]) E11.9    Arteriosclerosis of coronary artery - Primary I25.10     CAD - positive stress test - and recommend Coronary Cath ASAP to clear him for surgery of the left knee - TKR  - Asymptomatic  now and refer to cardiology           Relevant Orders    Referral to Cardiology       Benign essential hypertension I10      COPD with acute bronchitis (CMS/[facility]) J44.0, J20.9       COPD - Educate tobacco cessation extensively , no wheezing, no SOB, no Crackles heard/ Exacerbation. Get CXR. Continues mild exercises and inhalers. [place] preventive care and vaccinations. Add advair inhalers and spiriva inhaler.            Esophageal reflux K21.9     Diabetes (CMS/[facility]) - Primary E11.9     Hyperlipidemia E78.5     Malaise and fatigue R53.81, R53.83     Nicotine dependence, cigarettes, uncomplicated F17.210       Tobacco cessation - Educate risks of smoking (CAD/COPD/CANCER of the lung or urinary bladder/CVA). Educate life style changes and prescribe nicotine  patch and Wellbutrin 150mg BID. Educate stress reduction.                Obstructive sleep apnea syndrome G47.33     Benign prostatic hyperplasia without lower urinary tract symptoms N40.0     Acquired hypothyroidism E03.9     Arteriosclerosis of coronary artery I25.10     Nonalcoholic steatohepatitis (CELIS) K75.81               Benign essential hypertension I10         HTN - hypertension well/controlled .Target BP < 130/80  achieved. Educate low salt diet and exercise with weight loss. Educate home self monitoring and diary keeping. Educate risks of elevate blood pressure and benefits of prompt treatment.  Refill Amlodipine            COPD with acute bronchitis (CMS/HCC) J44.0, J20.9       COPD - Educate tobacco cessation extensively , no wheezing, no SOB, no Crackles heard/ Exacerbation.         Get CXR.  Continues mild exercises and inhalers.  [place] preventive care and vaccinations.                                       Add advair inhalers and spiriva inhaler.             Esophageal reflux K21.9       GERD - Acid reflux disease. Rx. PPI (Prilosec/Prevacid/Protonix/Nexium) and educate diet and life style changes. Referred patient to an endoscopy (EGD) and check H. Pylori titers.            Hyperlipidemia E78.5       Hypercholesterolemia - Monitor lipid profile and educate patient upon risks of high cholesterol and targets. Educate diet and change in lifestyle and increase in exercises - Refill: Fenofibrate and  Rosuvastatin and Vascepa  and educate compliance with medication and diet.             Malaise and fatigue R53.81, R53.83       Fatigue - check CMP(metabolic panel and elctrolytes) , CBC(complete blood cell count), TSH(thyroid function). Insomnia may play a role and sleep studies(rule out sleep apnea) are recommended . Educate sleep hygiene. Consider anxiety disorder vs. depression. Consider Stress test, and 2DECHO.             Lumbosacral spondylosis M47.817       DDD - Degenerative disc disease of the  Lumbo-Sacral (LS)/spine. Educate exercises and referred patient to Physical Therapy (PT). Ordered X-Ray's of the LS/ spine. Consider MRI. Radiculopathy in the distribution of L4-L5-S1/ nerve roots, needs NSAIDS (Naprosin 500mg BID vs. Arthrotec 75mg BID or Prednisone taper (Medrol dose pack), Flexeril 10 mg qHS, heat application, and pain control with Tylenol 325 mg TID.              Lumbar radiculopathy, chronic M54.16       DDD - Degenerative disc disease of the Lumbo-Sacral (LS)/spine. Educate exercises and referred patient to Physical Therapy (PT). Ordered X-Ray's of the LS/ spine. Consider MRI. Radiculopathy in the distribution of L4-L5-S1/ nerve roots, needs NSAIDS (Naprosin 500mg BID vs. Arthrotec 75mg BID or Prednisone taper (Medrol dose pack), Flexeril 10 mg qHS, heat application, and pain control with Tylenol 325 mg TID.             Nicotine dependence, cigarettes, uncomplicated F17.210       Tobacco cessation - Educate risks of smoking (CAD/COPD/CANCER of the lung or urinary bladder/CVA). Educate life style changes and prescribe nicotine patch and Wellbutrin 150mg BID. Educate stress reduction.                                                                                        Type 2 diabetes mellitus without complications (CMS/[facility]) E11.9       DM - NIDDM  . Reviewed with patient / Will check  HbA1c and fasting blood sugars. Educate home self monitoring and diary keeping(reviewed with patient home blood sugar levels /diary). Educate extensively low calorie diet and weight loss with exercise. Reviewed BS- diary and Rx. Regimen. [place] renal protection with ARB/ACEI.               Educate compliance with diet and Rx. And educate risks and autcomes.                                                   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       Influenza, High Dose Seasonal, Preservative Free10/19/2021  Influenza, Khrzyrryzql01/6/2017  Influenza, seasonal, xqmklsdtvi47/3/2018  Pfizer COVID-19 vaccine, Fall 2023, 12 years and older, (30mcg/0.3mL)10/9/2023  Pfizer Purple Cap SARS-CoV-210/13/2021, 4/8/2021, 3/16/2021                 Immunizations/Injections      very important  Immunizations from outside sources need reconciliation.      Influenza, High Dose Seasonal, Preservative Free10/19/2021  Influenza, Ltebpzhiujy27/6/2017  Influenza, seasonal, hgwkiygtqf37/3/2018  Pfizer COVID-19 vaccine, Fall 2023, 12 years and older, (30mcg/0.3mL)10/9/2023  Pfizer Purple Cap SARS-CoV-210/13/2021, 4/8/2021, 3/16/2021  RSV-MAb10/23/2023

## 2024-02-29 NOTE — ASSESSMENT & PLAN NOTE
CAD - positive stress test - and recommend Coronary Cath ASAP to clear him for surgery of the left knee - TKR  - Asymptomatic  now and refer to cardiology

## 2024-03-05 ENCOUNTER — TELEPHONE (OUTPATIENT)
Dept: PRIMARY CARE | Facility: CLINIC | Age: 64
End: 2024-03-05
Payer: COMMERCIAL

## 2024-03-07 ENCOUNTER — OFFICE VISIT (OUTPATIENT)
Dept: CARDIOLOGY | Facility: HOSPITAL | Age: 64
End: 2024-03-07
Payer: COMMERCIAL

## 2024-03-07 ENCOUNTER — PREP FOR PROCEDURE (OUTPATIENT)
Dept: CARDIOLOGY | Facility: HOSPITAL | Age: 64
End: 2024-03-07
Payer: COMMERCIAL

## 2024-03-07 VITALS
SYSTOLIC BLOOD PRESSURE: 150 MMHG | OXYGEN SATURATION: 96 % | DIASTOLIC BLOOD PRESSURE: 70 MMHG | WEIGHT: 286 LBS | HEART RATE: 68 BPM | BODY MASS INDEX: 35.56 KG/M2 | HEIGHT: 75 IN

## 2024-03-07 DIAGNOSIS — E78.49 OTHER HYPERLIPIDEMIA: ICD-10-CM

## 2024-03-07 DIAGNOSIS — R94.39 ABNORMAL STRESS TEST: Primary | ICD-10-CM

## 2024-03-07 DIAGNOSIS — I25.119 CORONARY ARTERY DISEASE INVOLVING NATIVE CORONARY ARTERY OF NATIVE HEART WITH ANGINA PECTORIS (CMS-HCC): Primary | ICD-10-CM

## 2024-03-07 DIAGNOSIS — E11.9 TYPE 2 DIABETES MELLITUS WITHOUT COMPLICATION, WITHOUT LONG-TERM CURRENT USE OF INSULIN (MULTI): ICD-10-CM

## 2024-03-07 DIAGNOSIS — I10 BENIGN ESSENTIAL HYPERTENSION: ICD-10-CM

## 2024-03-07 DIAGNOSIS — R94.39 ABNORMAL STRESS TEST: ICD-10-CM

## 2024-03-07 DIAGNOSIS — I25.10 ARTERIOSCLEROSIS OF CORONARY ARTERY: ICD-10-CM

## 2024-03-07 DIAGNOSIS — Z72.0 TOBACCO USE: ICD-10-CM

## 2024-03-07 PROCEDURE — 99205 OFFICE O/P NEW HI 60 MIN: CPT | Performed by: INTERNAL MEDICINE

## 2024-03-07 PROCEDURE — 99215 OFFICE O/P EST HI 40 MIN: CPT | Performed by: INTERNAL MEDICINE

## 2024-03-07 PROCEDURE — 93010 ELECTROCARDIOGRAM REPORT: CPT | Performed by: INTERNAL MEDICINE

## 2024-03-07 PROCEDURE — 99406 BEHAV CHNG SMOKING 3-10 MIN: CPT | Performed by: INTERNAL MEDICINE

## 2024-03-07 PROCEDURE — 4004F PT TOBACCO SCREEN RCVD TLK: CPT | Performed by: INTERNAL MEDICINE

## 2024-03-07 PROCEDURE — 93005 ELECTROCARDIOGRAM TRACING: CPT | Performed by: INTERNAL MEDICINE

## 2024-03-07 PROCEDURE — 3078F DIAST BP <80 MM HG: CPT | Performed by: INTERNAL MEDICINE

## 2024-03-07 PROCEDURE — 3077F SYST BP >= 140 MM HG: CPT | Performed by: INTERNAL MEDICINE

## 2024-03-07 PROCEDURE — 3051F HG A1C>EQUAL 7.0%<8.0%: CPT | Performed by: INTERNAL MEDICINE

## 2024-03-07 RX ORDER — NAPROXEN SODIUM 220 MG/1
81 TABLET, FILM COATED ORAL DAILY
Qty: 90 TABLET | Refills: 3 | Status: SHIPPED | OUTPATIENT
Start: 2024-03-07 | End: 2025-03-07

## 2024-03-07 RX ORDER — EVOLOCUMAB 140 MG/ML
140 INJECTION, SOLUTION SUBCUTANEOUS
Qty: 6 EACH | Refills: 4 | Status: SHIPPED | OUTPATIENT
Start: 2024-03-07

## 2024-03-07 RX ORDER — SODIUM CHLORIDE 9 MG/ML
100 INJECTION, SOLUTION INTRAVENOUS CONTINUOUS
Status: CANCELLED | OUTPATIENT
Start: 2024-03-07

## 2024-03-07 RX ORDER — CARVEDILOL 6.25 MG/1
6.25 TABLET ORAL
Qty: 180 TABLET | Refills: 3 | Status: SHIPPED | OUTPATIENT
Start: 2024-03-07 | End: 2024-04-11 | Stop reason: SDUPTHER

## 2024-03-07 ASSESSMENT — ENCOUNTER SYMPTOMS
DEPRESSION: 0
OCCASIONAL FEELINGS OF UNSTEADINESS: 1
LOSS OF SENSATION IN FEET: 0

## 2024-03-07 NOTE — PATIENT INSTRUCTIONS
Stop smoking.  Tobacco cessation referral.  Critical access hospital clinic referral.  Start aspirin 81 mg daily.  Start injectable cholesterol medication (Repatha) every 2 weeks.  Check CMP, CBC, and lipid profile.  Do not take metformin the morning of your procedure.  Start carvedilol 6.25 twice a day.  Follow-up in 4 to 6 weeks.

## 2024-03-07 NOTE — PROGRESS NOTES
Primary Care Physician: Richard Garcia MD  Date of Visit: 03/07/2024  1:00 PM EST  Location of visit: [facility]     Chief Complaint:   Chief Complaint   Patient presents with    Establish Care    Abnormal Stress Test        HPI / Summary:   Hammad Blackburn is a 63 y.o. male presents for for consultation for an abnormal stress test.  I was asked to see him by Dr. Garcia.  He is a pleasant 63-year-old white male with a past medical history significant for severe coronary atherosclerosis on CT, type II non-insulin-requiring diabetes, hypertension, dyslipidemia, obstructive sleep apnea, COPD, and chronic tobacco use.  He has no particular complaints.  He is most limited by chronic left knee pain and does not exercise regularly.  He was being evaluated for preoperative restratification prior to an upcoming left knee replacement.  He underwent a Lexiscan nuclear stress test that showed apical lateral infarct with preserved LV function.  No evidence of ischemia.  His ejection fraction was normal.  I he does have 1 flight of stairs in his basement and can walk up it without chest pain or shortness of breath.  He does note occasional lower extremity edema.  The patient denies chest pain, shortness of breath, palpitations, lightheadedness, syncope, orthopnea, paroxysmal nocturnal dyspnea, or bleeding problems.    The patient has no prior history of myocardial infarction, cerebrovascular disease, venous thromboembolic disease,  peripheral arterial disease/claudication, or bleeding.    He has longstanding diabetes for the last 15 years and takes metformin.  He also has longstanding hypertension and hyperlipidemia.  He has been on amlodipine for many years.  He has also been on rosuvastatin for approximately 15 years without side effects.  He has been on Vascepa for approximately 5 years without side effects.    He does have a history of nephrolithiasis.    He has had an umbilical hernia repair, appendectomy,  tonsillectomy, and left knee scope.    He has no known drug allergies.    He has a 25-pack-year history of tobacco use and continues to smoke 3 to 5 cigarettes a day.  He drinks 4-6 alcoholic beverages a week.  He denies any illicit drug use.  He is a retired .  He lives at home with his wife.    His father had coronary artery disease and had a CABG in his 40s.  No family history of sudden death.              Past Medical History:   Diagnosis Date    Acute abdominal pain 02/20/2024    Allergic otitis media 02/20/2024    Anxiety disorder, unspecified 10/15/2014    Anxiety    Arthritis     Cellulitis of axilla, left 11/16/2023    Chest discomfort 02/20/2024    Chronic pain disorder     COPD (chronic obstructive pulmonary disease) (CMS/[facility])     Cough 02/20/2024    Diabetes mellitus (CMS/[facility])     Elevated prostate specific antigen (PSA)     Elevated prostate specific antigen (PSA)    Fracture of phalanx of toe 02/20/2024    GERD (gastroesophageal reflux disease)     Herpes zoster with complication 02/20/2024    History of depression 02/20/2024    History of metabolic disorder 02/20/2024    Hyperlipidemia     Hypertension     Melanocytic nevus 02/20/2024    Comment on above: removed by Derm, epidermal papiloma;    Myopia of both eyes 02/20/2024    Nephrolithiasis     Pain in left knee 08/25/2022    Acute pain of left knee    Pain in right elbow 01/04/2022    Right elbow pain    Personal history of diseases of the skin and subcutaneous tissue 10/07/2014    History of urticaria    Personal history of other diseases of the circulatory system 10/07/2014    History of hypertension    Personal history of other diseases of the musculoskeletal system and connective tissue 10/15/2014    Personal history of arthritis    Personal history of other diseases of the respiratory system 11/28/2016    History of sinusitis    Personal history of other endocrine, nutritional and metabolic disease     History of high  cholesterol    Personal history of other mental and behavioral disorders 10/15/2014    History of depression    Personal history of other specified conditions 06/02/2015    History of fatigue    Pityriasis versicolor 05/18/2016    Tinea versicolor    Pruritic rash 02/20/2024    Sleep apnea     Unilateral primary osteoarthritis, left knee 08/25/2022    Primary osteoarthritis of left knee        Past Surgical History:   Procedure Laterality Date    ADENOIDECTOMY  10/15/2014    Adenoidectomy    APPENDECTOMY  08/08/2016    Appendectomy    COLONOSCOPY  07/08/2013    Complete Colonoscopy    HERNIA REPAIR      OTHER SURGICAL HISTORY  08/08/2016    Knee Arthroscopy With Medial And Lateral Meniscus Repair    TONSILLECTOMY  10/15/2014    Tonsillectomy          Social History:   reports that he has been smoking cigarettes. He has a 10.00 pack-year smoking history. He has never used smokeless tobacco. He reports current alcohol use. He reports that he does not use drugs.     Family History:  family history includes COPD in his mother; Diabetes in his father; htn in his father.      Allergies:  No Known Allergies    Outpatient Medications:  Current Outpatient Medications   Medication Instructions    allopurinol (ZYLOPRIM) 300 mg, oral, Daily    amLODIPine (NORVASC) 10 mg, oral, Daily    budesonide-formoteroL (Symbicort) 160-4.5 mcg/actuation inhaler 2 puffs, inhalation, 2 times daily PRN    co-enzyme Q-10 30 mg, oral, Daily    cyclobenzaprine (FLEXERIL) 10 mg, oral, 3 times daily PRN    fenofibrate (TRICOR) 145 mg, oral, Daily, Take with food.    fluticasone (Flonase) 50 mcg/actuation nasal spray 1 spray, Each Nostril, 2 times daily PRN    folic acid (FOLVITE) 1 mg, oral, Daily    metFORMIN (GLUCOPHAGE) 1,000 mg, oral, 2 times daily    omeprazole (PRILOSEC) 40 mg, oral, Daily    OneTouch Delica Plus Lancet 30 gauge misc 1 Lancet, Topical, Daily    rosuvastatin (CRESTOR) 40 mg, oral, Daily    triamcinolone (Kenalog) 0.1 %  "ointment Triamcinolone Acetonide 0.1 % External Ointment   Quantity: 80  Refills: 0        Start : 20-Aug-2020   Active    Vascepa 1 g, oral, Daily       Physical Exam:  Vitals:    03/07/24 1309 03/07/24 1310   BP: 148/72 150/70   BP Location: Left arm Right arm   Patient Position: Sitting    BP Cuff Size: Adult    Pulse: 68    SpO2: 96%    Weight: 130 kg (286 lb)    Height: 1.905 m (6' 3\")      Wt Readings from Last 5 Encounters:   03/07/24 130 kg (286 lb)   02/29/24 128 kg (282 lb)   02/20/24 128 kg (282 lb)   02/07/24 131 kg (288 lb)   02/07/24 129 kg (285 lb 4.4 oz)     Body mass index is 35.75 kg/m².   General: Well-developed well-nourished in no acute distress  HEENT: Normocephalic atraumatic  Neck: Supple, JVP is normal negative hepatojugular reflux 2+ carotid pulses without bruit  Pulmonary: Normal respiratory effort, clear to auscultation  Cardiovascular: No right ventricular heave, normal S1 and S2, 1 out of 6 early peaking systolic ejection murmur no rubs or gallops  Abdomen: Soft nontender nondistended  Extremities: Warm trace bilateral ankle edema 2+ radial pulses bilaterally 2+ dorsalis pedis pulses bilaterally  Neurologic: Alert and oriented x3  Psychiatric: Normal mood and affect     Last Labs:  CMP:  Recent Labs     02/07/24  1020 03/24/23  1041 08/15/22  1033 06/21/18  0846 05/04/18  0304    139 140   < > 139   K 4.5 4.6 4.7   < > 4.4    99 100   < > 103   CO2 27 29 29   < > 24   ANIONGAP 16 16 16   < > 12   BUN 19 19 16   < > 25   CREATININE 0.99 0.89 0.89   < > 0.9   EGFR 85  --   --   --  92   GLUCOSE 166* 132* 130*   < > 168*    < > = values in this interval not displayed.     Recent Labs     03/24/23  1041 08/15/22  1033 05/27/22  0930   ALBUMIN 4.4 4.6 4.6   ALKPHOS 63 78 65   ALT 20 21 20   AST 19 19 19   BILITOT 0.7 0.6 0.8     CBC:  Recent Labs     02/07/24  1020 03/24/23  1041 08/15/22  1033   WBC 7.9 9.4 6.4   HGB 14.2 15.0 15.0   HCT 43.7 47.4 45.7    219 213   MCV 91 " 92 92     COAG:   Recent Labs     01/31/19  0631 01/30/19  1055   INR 1.0 1.0     HEME/ENDO:  Recent Labs     02/07/24  1020 10/06/23  0951 03/24/23  1041 08/15/22  1033 05/27/22  0930   TSH  --   --  3.84 4.01* 4.18*   HGBA1C 7.6* 7.3* 7.8* 7.3* 7.5*      CARDIAC:   Recent Labs     01/30/19  1055   BNP 9     Recent Labs     03/24/23  1041 08/15/22  1033 05/27/22  0930   CHOL 186 182 178   LDLF 92 81 81   HDL 45.4 42.4 36.9*   TRIG 241* 291* 303*       Last Cardiology Tests:  ECG:  An electrocardiogram performed today that I reviewed shows normal sinus rhythm and is normal.    I reviewed electrocardiogram from February 8, 2024 that showed normal sinus rhythm with nonspecific ST abnormality.    Echo:  Echocardiogram February 15, 2024.  Normal LV function with an ejection fraction of 65 to 70%.  Moderate concentric left ventricular hypertrophy septum 1.3 cm and a posterior wall measurement of 1.5 cm  Sclerotic aortic valve without significant stenosis  Mild mitral regurgitation  Mild to moderate tricuspid regurgitation    Echocardiogram January 31, 2019  CONCLUSIONS:   1. The left ventricular systolic function is normal with a 60-65% estimated ejection fraction.   2. Moderately increased left ventricular septal thickness.   3. Spectral Doppler shows an impaired relaxation pattern of left ventricular diastolic filling.   4. RVSP within normal limits.   5. Aortic valve stenosis is not present.   6. The pulmonary artery is not well visualized.          Cath:      Stress Test:  Lexiscan nuclear stress test February 14, 2024  Small area of ischemia in the apical lateral wall with basal lateral scar.  Normal LV function with an ejection fraction of 60%.  No angina or arrhythmias or ischemic EKG changes with pharmacological stress.  Mild resting hypertension.         Cardiac Imaging:  CT lung screening 5/18/2023  HEART AND VESSELS:   The thoracic aorta normal in course and caliber.There is mild   scattered calcified  atherosclerosis present.   Main pulmonary artery and its branches are normal in caliber.   Severe coronary artery calcifications are seen. Please note,the study   is not optimized for evaluation of coronary arteries.   The cardiac chambers are not enlarged.   There is no pericardial effusion seen.   Impression   1. 2 mm right lower lobe nodule, likely benign. Continued screening  with low-dose noncontrast chest CT in 12 months (from current date)  is recommended.  2. Mild upper lung predominant emphysema.  3. Severe coronary artery calcification. Correlation with coronary  artery disease risk factors.  4. Diffuse hepatic steatosis. PCP evaluation is recommended.       Assessment/Plan   Diagnoses and all orders for this visit:  Abnormal stress test  -     ECG 12 lead (Clinic Performed)  Arteriosclerosis of coronary artery  -     Referral to Cardiology  -     evolocumab (Repatha SureClick) 140 mg/mL injection; Inject 1 mL (140 mg) under the skin every 14 (fourteen) days.  -     aspirin 81 mg chewable tablet; Chew 1 tablet (81 mg) once daily.  -     Comprehensive metabolic panel; Future  -     CBC; Future  -     Lipid panel; Future  Other hyperlipidemia  -     evolocumab (Repatha SureClick) 140 mg/mL injection; Inject 1 mL (140 mg) under the skin every 14 (fourteen) days.  -     Lipid panel; Future  Benign essential hypertension  -     carvedilol (Coreg) 6.25 mg tablet; Take 1 tablet (6.25 mg) by mouth 2 times a day with meals.  Type 2 diabetes mellitus without complication, without long-term current use of insulin (CMS/[facility])  -     Referral to [facility]; Future  Tobacco use  -     Referral to Tobacco Cessation Counseling; Future    In summary Mr. Blackburn is a pleasant 63-year-old white male with a past medical history significant for severe coronary atherosclerosis on CT, type II non-insulin-requiring diabetes, hypertension, dyslipidemia, obstructive sleep apnea, obesity, COPD, and chronic tobacco use.  He is seemingly  asymptomatic from a cardiac perspective but is significantly limited physically by chronic left knee pain.  He appears euvolemic on exam.  He has severe coronary artery calcifications on CT.  His SPECT images are suggestive of apical lateral ischemia with questionable lateral infarct.  His ejection fraction is preserved.  Given his multiple risk factors and concern for multivessel disease I would recommend further evaluation with cardiac catheterization to define his coronary anatomy.  I explained the risks, benefits, and alternatives to the patient and his wife.  They agree to proceed.  I started aspirin 81 mg daily.  I educated him with regards to the importance of risk factor modification.  I strongly encouraged him to stop smoking and spent more than 3 minutes of time counseling him to do so.  I referred him to tobacco cessation.  I also referred him to the [facility] clinic for his diabetes.  His blood pressure is not controlled.  I added carvedilol 6.25 mg twice a day.  His most recent lipid profile was not at goal.  I recommended a PCSK9 inhibitor.  He should continue his other cardiovascular medications.  I ordered labs as indicated below.  He will follow-up for his procedure and we will see him back in the office in 4 to 6 weeks.      Orders:  Orders Placed This Encounter   Procedures    Comprehensive metabolic panel    CBC    Lipid panel    Referral to [facility]    Referral to Tobacco Cessation Counseling    ECG 12 lead (Clinic Performed)      Followup Appts:  Future Appointments   Date Time Provider Department Center   3/20/2024  2:30 PM Jung Plunkett MD [id number] [facility]   4/3/2024  9:00 AM [address]   5/8/2024 10:15 AM Jung Plunkett MD [id number] [facility]           ____________________________________________________________  Alan Davis MD  [facility]  [facility]

## 2024-03-08 ENCOUNTER — LAB (OUTPATIENT)
Dept: LAB | Facility: LAB | Age: 64
End: 2024-03-08
Payer: COMMERCIAL

## 2024-03-08 DIAGNOSIS — R53.83 MALAISE AND FATIGUE: ICD-10-CM

## 2024-03-08 DIAGNOSIS — I25.10 ARTERIOSCLEROSIS OF CORONARY ARTERY: ICD-10-CM

## 2024-03-08 DIAGNOSIS — E03.9 ACQUIRED HYPOTHYROIDISM: ICD-10-CM

## 2024-03-08 DIAGNOSIS — E11.9 TYPE 2 DIABETES MELLITUS WITHOUT COMPLICATION, WITHOUT LONG-TERM CURRENT USE OF INSULIN (MULTI): ICD-10-CM

## 2024-03-08 DIAGNOSIS — E78.49 OTHER HYPERLIPIDEMIA: ICD-10-CM

## 2024-03-08 DIAGNOSIS — R53.81 MALAISE AND FATIGUE: ICD-10-CM

## 2024-03-08 DIAGNOSIS — N40.0 BENIGN PROSTATIC HYPERPLASIA WITHOUT LOWER URINARY TRACT SYMPTOMS: ICD-10-CM

## 2024-03-08 LAB
ALBUMIN SERPL BCP-MCNC: 4.9 G/DL (ref 3.4–5)
ALP SERPL-CCNC: 67 U/L (ref 33–136)
ALT SERPL W P-5'-P-CCNC: 21 U/L (ref 10–52)
ANION GAP SERPL CALC-SCNC: 14 MMOL/L (ref 10–20)
AST SERPL W P-5'-P-CCNC: 17 U/L (ref 9–39)
BASOPHILS # BLD AUTO: 0.04 X10*3/UL (ref 0–0.1)
BASOPHILS NFR BLD AUTO: 0.5 %
BILIRUB SERPL-MCNC: 0.6 MG/DL (ref 0–1.2)
BUN SERPL-MCNC: 21 MG/DL (ref 6–23)
CALCIUM SERPL-MCNC: 9.5 MG/DL (ref 8.6–10.6)
CHLORIDE SERPL-SCNC: 103 MMOL/L (ref 98–107)
CHOLEST SERPL-MCNC: 209 MG/DL (ref 0–199)
CHOLESTEROL/HDL RATIO: 5.7
CO2 SERPL-SCNC: 27 MMOL/L (ref 21–32)
CREAT SERPL-MCNC: 0.93 MG/DL (ref 0.5–1.3)
EGFRCR SERPLBLD CKD-EPI 2021: >90 ML/MIN/1.73M*2
EOSINOPHIL # BLD AUTO: 0.12 X10*3/UL (ref 0–0.7)
EOSINOPHIL NFR BLD AUTO: 1.6 %
ERYTHROCYTE [DISTWIDTH] IN BLOOD BY AUTOMATED COUNT: 13 % (ref 11.5–14.5)
EST. AVERAGE GLUCOSE BLD GHB EST-MCNC: 169 MG/DL
GLUCOSE SERPL-MCNC: 164 MG/DL (ref 74–99)
HBA1C MFR BLD: 7.5 %
HCT VFR BLD AUTO: 45.6 % (ref 41–52)
HDLC SERPL-MCNC: 36.6 MG/DL
HGB BLD-MCNC: 14.6 G/DL (ref 13.5–17.5)
IMM GRANULOCYTES # BLD AUTO: 0.02 X10*3/UL (ref 0–0.7)
IMM GRANULOCYTES NFR BLD AUTO: 0.3 % (ref 0–0.9)
LDLC SERPL CALC-MCNC: 101 MG/DL
LYMPHOCYTES # BLD AUTO: 1.65 X10*3/UL (ref 1.2–4.8)
LYMPHOCYTES NFR BLD AUTO: 21.5 %
MCH RBC QN AUTO: 29.3 PG (ref 26–34)
MCHC RBC AUTO-ENTMCNC: 32 G/DL (ref 32–36)
MCV RBC AUTO: 91 FL (ref 80–100)
MONOCYTES # BLD AUTO: 0.35 X10*3/UL (ref 0.1–1)
MONOCYTES NFR BLD AUTO: 4.6 %
NEUTROPHILS # BLD AUTO: 5.51 X10*3/UL (ref 1.2–7.7)
NEUTROPHILS NFR BLD AUTO: 71.5 %
NON HDL CHOLESTEROL: 172 MG/DL (ref 0–149)
NRBC BLD-RTO: 0 /100 WBCS (ref 0–0)
PLATELET # BLD AUTO: 220 X10*3/UL (ref 150–450)
POTASSIUM SERPL-SCNC: 4.5 MMOL/L (ref 3.5–5.3)
PROT SERPL-MCNC: 6.8 G/DL (ref 6.4–8.2)
PSA SERPL-MCNC: 1.29 NG/ML
RBC # BLD AUTO: 4.99 X10*6/UL (ref 4.5–5.9)
SODIUM SERPL-SCNC: 139 MMOL/L (ref 136–145)
TRIGL SERPL-MCNC: 359 MG/DL (ref 0–149)
TSH SERPL-ACNC: 3.18 MIU/L (ref 0.44–3.98)
VLDL: 72 MG/DL (ref 0–40)
WBC # BLD AUTO: 7.7 X10*3/UL (ref 4.4–11.3)

## 2024-03-08 PROCEDURE — 84443 ASSAY THYROID STIM HORMONE: CPT

## 2024-03-08 PROCEDURE — 84153 ASSAY OF PSA TOTAL: CPT

## 2024-03-08 PROCEDURE — 83036 HEMOGLOBIN GLYCOSYLATED A1C: CPT

## 2024-03-08 PROCEDURE — 85025 COMPLETE CBC W/AUTO DIFF WBC: CPT

## 2024-03-08 PROCEDURE — 80061 LIPID PANEL: CPT

## 2024-03-08 PROCEDURE — 36415 COLL VENOUS BLD VENIPUNCTURE: CPT

## 2024-03-08 PROCEDURE — 80053 COMPREHEN METABOLIC PANEL: CPT

## 2024-03-11 ENCOUNTER — HOSPITAL ENCOUNTER (OUTPATIENT)
Facility: HOSPITAL | Age: 64
Setting detail: OUTPATIENT SURGERY
Discharge: HOME | End: 2024-03-11
Attending: INTERNAL MEDICINE | Admitting: INTERNAL MEDICINE
Payer: COMMERCIAL

## 2024-03-11 VITALS
HEIGHT: 75 IN | SYSTOLIC BLOOD PRESSURE: 158 MMHG | BODY MASS INDEX: 35.63 KG/M2 | OXYGEN SATURATION: 96 % | TEMPERATURE: 96.8 F | RESPIRATION RATE: 18 BRPM | DIASTOLIC BLOOD PRESSURE: 79 MMHG | WEIGHT: 286.6 LBS | HEART RATE: 61 BPM

## 2024-03-11 DIAGNOSIS — R94.30 ABNORMAL RESULT OF CARDIOVASCULAR FUNCTION STUDY, UNSPECIFIED: ICD-10-CM

## 2024-03-11 DIAGNOSIS — I25.10 ATHEROSCLEROTIC HEART DISEASE OF NATIVE CORONARY ARTERY WITHOUT ANGINA PECTORIS: ICD-10-CM

## 2024-03-11 DIAGNOSIS — I25.119 CORONARY ARTERY DISEASE INVOLVING NATIVE CORONARY ARTERY OF NATIVE HEART WITH ANGINA PECTORIS (CMS-HCC): Primary | ICD-10-CM

## 2024-03-11 DIAGNOSIS — R94.39 ABNORMAL STRESS TEST: ICD-10-CM

## 2024-03-11 DIAGNOSIS — E11.9 TYPE 2 DIABETES MELLITUS WITHOUT COMPLICATION, WITHOUT LONG-TERM CURRENT USE OF INSULIN (MULTI): ICD-10-CM

## 2024-03-11 PROCEDURE — 99153 MOD SED SAME PHYS/QHP EA: CPT | Performed by: INTERNAL MEDICINE

## 2024-03-11 PROCEDURE — 93458 L HRT ARTERY/VENTRICLE ANGIO: CPT | Performed by: INTERNAL MEDICINE

## 2024-03-11 PROCEDURE — 2550000001 HC RX 255 CONTRASTS: Performed by: INTERNAL MEDICINE

## 2024-03-11 PROCEDURE — 2500000005 HC RX 250 GENERAL PHARMACY W/O HCPCS: Performed by: INTERNAL MEDICINE

## 2024-03-11 PROCEDURE — 99152 MOD SED SAME PHYS/QHP 5/>YRS: CPT | Performed by: INTERNAL MEDICINE

## 2024-03-11 PROCEDURE — C1894 INTRO/SHEATH, NON-LASER: HCPCS | Performed by: INTERNAL MEDICINE

## 2024-03-11 PROCEDURE — 2720000007 HC OR 272 NO HCPCS: Performed by: INTERNAL MEDICINE

## 2024-03-11 PROCEDURE — C1760 CLOSURE DEV, VASC: HCPCS | Performed by: INTERNAL MEDICINE

## 2024-03-11 PROCEDURE — 2500000004 HC RX 250 GENERAL PHARMACY W/ HCPCS (ALT 636 FOR OP/ED): Performed by: INTERNAL MEDICINE

## 2024-03-11 PROCEDURE — C1769 GUIDE WIRE: HCPCS | Performed by: INTERNAL MEDICINE

## 2024-03-11 PROCEDURE — 99222 1ST HOSP IP/OBS MODERATE 55: CPT | Performed by: NURSE PRACTITIONER

## 2024-03-11 PROCEDURE — 7100000010 HC PHASE TWO TIME - EACH INCREMENTAL 1 MINUTE: Performed by: INTERNAL MEDICINE

## 2024-03-11 PROCEDURE — 7100000009 HC PHASE TWO TIME - INITIAL BASE CHARGE: Performed by: INTERNAL MEDICINE

## 2024-03-11 RX ORDER — LIDOCAINE HYDROCHLORIDE 10 MG/ML
INJECTION, SOLUTION EPIDURAL; INFILTRATION; INTRACAUDAL; PERINEURAL AS NEEDED
Status: DISCONTINUED | OUTPATIENT
Start: 2024-03-11 | End: 2024-03-11 | Stop reason: HOSPADM

## 2024-03-11 RX ORDER — ACETAMINOPHEN 650 MG/1
650 SUPPOSITORY RECTAL EVERY 6 HOURS PRN
Status: DISCONTINUED | OUTPATIENT
Start: 2024-03-11 | End: 2024-03-11 | Stop reason: HOSPADM

## 2024-03-11 RX ORDER — FENTANYL CITRATE 50 UG/ML
INJECTION, SOLUTION INTRAMUSCULAR; INTRAVENOUS AS NEEDED
Status: DISCONTINUED | OUTPATIENT
Start: 2024-03-11 | End: 2024-03-11 | Stop reason: HOSPADM

## 2024-03-11 RX ORDER — SODIUM CHLORIDE 9 MG/ML
1.5 INJECTION, SOLUTION INTRAVENOUS CONTINUOUS
Status: DISCONTINUED | OUTPATIENT
Start: 2024-03-11 | End: 2024-03-11 | Stop reason: HOSPADM

## 2024-03-11 RX ORDER — SODIUM CHLORIDE 9 MG/ML
100 INJECTION, SOLUTION INTRAVENOUS CONTINUOUS
Status: DISCONTINUED | OUTPATIENT
Start: 2024-03-11 | End: 2024-03-11

## 2024-03-11 RX ORDER — ACETAMINOPHEN 160 MG/5ML
650 SOLUTION ORAL EVERY 6 HOURS PRN
Status: DISCONTINUED | OUTPATIENT
Start: 2024-03-11 | End: 2024-03-11 | Stop reason: HOSPADM

## 2024-03-11 RX ORDER — METFORMIN HYDROCHLORIDE 500 MG/1
1000 TABLET ORAL 2 TIMES DAILY
COMMUNITY
Start: 2024-03-11 | End: 2024-03-12 | Stop reason: SDUPTHER

## 2024-03-11 RX ORDER — ACETAMINOPHEN 325 MG/1
650 TABLET ORAL EVERY 6 HOURS PRN
Status: DISCONTINUED | OUTPATIENT
Start: 2024-03-11 | End: 2024-03-11 | Stop reason: HOSPADM

## 2024-03-11 RX ORDER — HEPARIN SODIUM 1000 [USP'U]/ML
INJECTION, SOLUTION INTRAVENOUS; SUBCUTANEOUS AS NEEDED
Status: DISCONTINUED | OUTPATIENT
Start: 2024-03-11 | End: 2024-03-11 | Stop reason: HOSPADM

## 2024-03-11 RX ORDER — MIDAZOLAM HYDROCHLORIDE 1 MG/ML
INJECTION, SOLUTION INTRAMUSCULAR; INTRAVENOUS AS NEEDED
Status: DISCONTINUED | OUTPATIENT
Start: 2024-03-11 | End: 2024-03-11 | Stop reason: HOSPADM

## 2024-03-11 RX ORDER — VERAPAMIL HYDROCHLORIDE 2.5 MG/ML
INJECTION, SOLUTION INTRAVENOUS AS NEEDED
Status: DISCONTINUED | OUTPATIENT
Start: 2024-03-11 | End: 2024-03-11 | Stop reason: HOSPADM

## 2024-03-11 RX ORDER — SODIUM CHLORIDE 9 MG/ML
100 INJECTION, SOLUTION INTRAVENOUS CONTINUOUS
Status: DISCONTINUED | OUTPATIENT
Start: 2024-03-11 | End: 2024-03-11 | Stop reason: HOSPADM

## 2024-03-11 ASSESSMENT — ENCOUNTER SYMPTOMS
GASTROINTESTINAL NEGATIVE: 1
MUSCULOSKELETAL NEGATIVE: 1
RESPIRATORY NEGATIVE: 1
PSYCHIATRIC NEGATIVE: 1
ENDOCRINE NEGATIVE: 1
CARDIOVASCULAR NEGATIVE: 1
CONSTITUTIONAL NEGATIVE: 1
NEUROLOGICAL NEGATIVE: 1
EYES NEGATIVE: 1
HEMATOLOGIC/LYMPHATIC NEGATIVE: 1
ALLERGIC/IMMUNOLOGIC NEGATIVE: 1

## 2024-03-11 ASSESSMENT — COLUMBIA-SUICIDE SEVERITY RATING SCALE - C-SSRS
1. IN THE PAST MONTH, HAVE YOU WISHED YOU WERE DEAD OR WISHED YOU COULD GO TO SLEEP AND NOT WAKE UP?: NO
6. HAVE YOU EVER DONE ANYTHING, STARTED TO DO ANYTHING, OR PREPARED TO DO ANYTHING TO END YOUR LIFE?: NO
2. HAVE YOU ACTUALLY HAD ANY THOUGHTS OF KILLING YOURSELF?: NO

## 2024-03-11 ASSESSMENT — PAIN SCALES - GENERAL
PAINLEVEL_OUTOF10: 0-NO PAIN

## 2024-03-11 NOTE — DISCHARGE INSTRUCTIONS
Please continue taking aspirin 81mg daily and Repatha as prescribed            CARDIAC CATHETERIZATION DISCHARGE INSTRUCTIONS     FOR SUDDEN AND SEVERE CHEST PAIN, SHORTNESS OF BREATH, EXCESSIVE BLEEDING, SIGNS OF STROKE, OR CHANGES IN MENTAL STATUS YOU SHOULD CALL 911 IMMEDIATELY.     If your provider has prescribed aspirin and/or clopidogrel (Plavix), or prasugrel (Effient), or ticagrelor (Brilinta), DO NOT STOP THESE MEDICATIONS for any reason without talking to your cardiologist first. If any of these were prescribed, you must take them every day without missing a single dose. If you are getting low on these medications, contact your provider immediately for a refill.     FOR NEXT 24 HOURS  - Upon discharge, you should return home and rest for the remainder of the day and evening. You do not have to stay on bed rest but should not be very active.  It is recommended a responsible adult be with you for the first 24 hours after the procedure.    - No driving for 24 hours after procedure. Please arrange for someone to drive you home from the hospital today.     - Do not drive, operate machinery, or use power tools for 24 hours after your procedure.     - Do not make any legal decisions for 24 hours after your procedure.     - Do not drink alcoholic beverages for 24 hours after your procedure.    WOUND CARE   *FOR FEMORAL (LEG) ACCESS*  ·      Avoid heavy lifting (over 10 pounds) for 7 days, squatting or excessive bending for 2 days, and strenuous exercise for 7 days.  ·      No submerged bathing, swimming, or hot tubs for the next 7 days, or until fully healed.  ·      Avoid sexual activity for 3-4 days until any groin discomfort has ceased.     *FOR RADIAL (WRIST) ACCESS*  ·      No lifting more than 5 pounds or excessive use of the wrist for 24 hours - for example, treat your wrist as if it is sprained.  ·      Do not engage in vigorous activities (tennis, golf, bowling, weights) for at least 48 hours after the  procedure.  ·      Do not submerge the wrist for 7 days after the procedure.  ·      You should expect mild tingling in your hand and tenderness at the puncture site for up to 3 days.    - The transparent dressing should be removed from the site 24 hours after the procedure.  Wash the site gently with soap and water. Rinse well and pat dry. Keep the area clean and dry. You may apply a Band-Aid to the site. Avoid lotions, ointments, or powders until fully healed.     - You may shower the day after your procedure.      - It is normal to notice a small bruise around the puncture site and/or a small grape sized or smaller lump. Any large bruising or large lump warrants a call to the office.     - If bleeding should occur, lay down and apply pressure to the affected area for 10 minutes.  If the bleeding stops notify your physician.  If there is a large amount of bleeding or spurting of blood CALL 911 immediately.  DO NOT drive yourself to the hospital.    - You may experience some tenderness, bruising or minimal inflammation.  If you have any concerns, you may contact the Cath Lab or if any of these symptoms become excessive, contact your cardiologist or go to the emergency room.     OTHER INSTRUCTIONS  - You may take acetaminophen (Tylenol) as directed for discomfort.  If pain is not relieved with acetaminophen (Tylenol), contact your doctor.    - If you notice or experience any of the following, you should notify your doctor or seek medical attention  Chest pain or discomfort  Change in mental status or weakness in extremities.  Dizziness, light headedness, or feeling faint.  Change in the site where the procedure was performed, such as bleeding or an increased area of bruising or swelling.  Tingling, numbness, pain, or coolness in the leg/arm beyond the site where the procedure was performed.  Signs of infection (i.e. shaking chills, temperature > 100 degrees Fahrenheit, warmth, redness) in the leg/arm area where the  procedure was performed.  Changes in urination   Bloody or black stools  Vomiting blood  Severe nose bleeds  Any excessive bleeding    - If you DO NOT have an appointment with your cardiologist within 2-4 weeks following your procedure, please contact their office.

## 2024-03-11 NOTE — POST-PROCEDURE NOTE
Physician Transition of Care Summary  Invasive Cardiovascular Lab    Procedure Date: 3/11/2024  Attending:    * Alan Davis - Primary  Resident/Fellow/Other Assistant: Surgeon(s) and Role:    Indications:   Pre-op Diagnosis     * Coronary artery disease involving native coronary artery of native heart with angina pectoris (CMS/HCC) [I25.119]     * Abnormal stress test [R94.39]    Post-procedure diagnosis:   Post-op Diagnosis     * Coronary artery disease involving native coronary artery of native heart with angina pectoris (CMS/HCC) [I25.119]     * Abnormal stress test [R94.39]    Procedure(s):   Left Heart Cath with Coronary Angiography and LV  12743 - [place] CATH PLMT L HRT & ARTS W/NJX & ANGIO IMG S&I        Procedure Findings:   Severe branch vessel CAD in a right dominant system.  LVEDP 20  No AS    Description of the Procedure:   R radial    Complications:   none    Stents/Implants:   none    Anticoagulation/Antiplatelet Plan:   asa    Estimated Blood Loss:   5 mL    Anesthesia: Moderate Sedation Anesthesia Staff: No anesthesia staff entered.    Any Specimen(s) Removed:   No specimens collected during this procedure.    Disposition:   home      Electronically signed by: Alan Davis MD, 3/11/2024 11:46 AM

## 2024-03-11 NOTE — H&P
History Of Present Illness  Hammad Blackburn is a 63 y.o. male presenting with abnormal stress test. PMH includes DMII, HTN, HLD, CHAD, COPD, chronic tobacco use.     Past Medical History  He has a past medical history of Acute abdominal pain (02/20/2024), Allergic otitis media (02/20/2024), Anxiety disorder, unspecified (10/15/2014), Arthritis, Cellulitis of axilla, left (11/16/2023), Chest discomfort (02/20/2024), Chronic pain disorder, COPD (chronic obstructive pulmonary disease) (CMS/[facility]), Cough (02/20/2024), Diabetes mellitus (CMS/[facility]), Elevated prostate specific antigen (PSA), Fracture of phalanx of toe (02/20/2024), GERD (gastroesophageal reflux disease), Herpes zoster with complication (02/20/2024), History of depression (02/20/2024), History of metabolic disorder (02/20/2024), Hyperlipidemia, Hypertension, Melanocytic nevus (02/20/2024), Myopia of both eyes (02/20/2024), Nephrolithiasis, Pain in left knee (08/25/2022), Pain in right elbow (01/04/2022), Personal history of diseases of the skin and subcutaneous tissue (10/07/2014), Personal history of other diseases of the circulatory system (10/07/2014), Personal history of other diseases of the musculoskeletal system and connective tissue (10/15/2014), Personal history of other diseases of the respiratory system (11/28/2016), Personal history of other endocrine, nutritional and metabolic disease, Personal history of other mental and behavioral disorders (10/15/2014), Personal history of other specified conditions (06/02/2015), Pityriasis versicolor (05/18/2016), Pruritic rash (02/20/2024), Sleep apnea, and Unilateral primary osteoarthritis, left knee (08/25/2022).    Surgical History  He has a past surgical history that includes Colonoscopy (07/08/2013); Other surgical history (08/08/2016); Appendectomy (08/08/2016); Tonsillectomy (10/15/2014); Adenoidectomy (10/15/2014); and Hernia repair.     Social History  He reports that he has been smoking cigarettes. He  has a 10.00 pack-year smoking history. He has never used smokeless tobacco. He reports current alcohol use. He reports that he does not use drugs.    Family History  Family History   Problem Relation Name Age of Onset    COPD Mother      Other (htn) Father      Diabetes Father          Allergies  Patient has no known allergies.    Home Medications  No current facility-administered medications on file prior to encounter.     Current Outpatient Medications on File Prior to Encounter   Medication Sig Dispense Refill    allopurinol (Zyloprim) 300 mg tablet TAKE 1 TABLET BY MOUTH EVERY DAY 90 tablet 0    amLODIPine (Norvasc) 10 mg tablet Take 1 tablet (10 mg) by mouth once daily. 90 tablet 0    budesonide-formoteroL (Symbicort) 160-4.5 mcg/actuation inhaler Inhale 2 puffs 2 times a day as needed.      co-enzyme Q-10 30 mg capsule Take 1 capsule (30 mg) by mouth once daily.      cyclobenzaprine (Flexeril) 10 mg tablet TAKE 1 TABLET BY MOUTH THREE TIMES A DAY AS NEEDED 90 tablet 0    fenofibrate (Tricor) 145 mg tablet Take 1 tablet (145 mg) by mouth once daily. Take with food. 90 tablet 0    fluticasone (Flonase) 50 mcg/actuation nasal spray Administer 1 spray into each nostril 2 times a day as needed.      folic acid (Folvite) 1 mg tablet TAKE 1 TABLET BY MOUTH EVERY DAY 90 tablet 0    metFORMIN (Glucophage) 500 mg tablet Take 2 tablets (1,000 mg) by mouth 2 times a day. (Patient taking differently: Take 2 tablets (1,000 mg) by mouth 2 times a day. Patient takes 1 tablet in am, 2 tablet at dinner , and 1 tablet at bedtime) 360 tablet 0    omeprazole (PriLOSEC) 40 mg DR capsule TAKE 1 CAPSULE BY MOUTH EVERY DAY 90 capsule 0    OneTouch Delica Plus Lancet 30 gauge misc Apply 1 Lancet topically once daily. 100 each 0    rosuvastatin (Crestor) 40 mg tablet TAKE 1 TABLET BY MOUTH EVERY DAY 90 tablet 0    Vascepa 1 gram capsule TAKE 1 CAPSULE (1 G) BY MOUTH ONCE DAILY. 90 capsule 0    aspirin 81 mg chewable tablet Chew 1 tablet  (81 mg) once daily. 90 tablet 3    carvedilol (Coreg) 6.25 mg tablet Take 1 tablet (6.25 mg) by mouth 2 times a day with meals. 180 tablet 3    evolocumab (Repatha SureClick) 140 mg/mL injection Inject 1 mL (140 mg) under the skin every 14 (fourteen) days. 6 each 4    triamcinolone (Kenalog) 0.1 % ointment Triamcinolone Acetonide 0.1 % External Ointment   Quantity: 80  Refills: 0        Start : 20-Aug-2020   Active            Inpatient Medications:            Review of Systems   Constitutional: Negative.    HENT: Negative.     Eyes: Negative.    Respiratory: Negative.     Cardiovascular: Negative.    Gastrointestinal: Negative.    Endocrine: Negative.    Genitourinary: Negative.    Musculoskeletal: Negative.    Skin: Negative.    Allergic/Immunologic: Negative.    Neurological: Negative.    Hematological: Negative.    Psychiatric/Behavioral: Negative.            Physical Exam  Constitutional:       General: He is awake. He is not in acute distress.     Appearance: He is not ill-appearing or toxic-appearing.   HENT:      Nose: Nose normal.      Mouth/Throat:      Mouth: Mucous membranes are moist.      Pharynx: Oropharynx is clear.   Eyes:      Extraocular Movements: Extraocular movements intact.      Conjunctiva/sclera: Conjunctivae normal.   Cardiovascular:      Rate and Rhythm: Normal rate and regular rhythm.      Pulses:           Radial pulses are 2+ on the right side and 2+ on the left side.        Dorsalis pedis pulses are 1+ on the right side and 1+ on the left side.      Heart sounds: Normal heart sounds. No murmur heard.  Pulmonary:      Effort: Pulmonary effort is normal.      Breath sounds: Normal breath sounds and air entry.   Abdominal:      General: Bowel sounds are normal. There is no distension.      Palpations: Abdomen is soft.      Tenderness: There is no abdominal tenderness.   Musculoskeletal:      Cervical back: Normal range of motion and neck supple.      Right lower le+ Pitting Edema  "present.      Left lower le+ Pitting Edema present.   Skin:     General: Skin is warm and dry.   Neurological:      General: No focal deficit present.      Mental Status: He is alert and oriented to person, place, and time. Mental status is at baseline.      GCS: GCS eye subscore is 4. GCS verbal subscore is 5. GCS motor subscore is 6.   Psychiatric:         Mood and Affect: Mood normal.         Behavior: Behavior normal. Behavior is cooperative.         Thought Content: Thought content normal.         Judgment: Judgment normal.        Sedation Plan    ASA 3     Mallampati class: III.         Last Recorded Vitals  Blood pressure 158/79, pulse 61, temperature 36 °C (96.8 °F), temperature source Temporal, resp. rate 18, height 1.905 m (6' 3\"), weight 130 kg (286 lb 9.6 oz), SpO2 95 %.         Vitals from the Past 24 Hours  Heart Rate:  [61]   Temp:  [36 °C (96.8 °F)]   Resp:  [18]   BP: (158)/(79)   Height:  [190.5 cm (6' 3\")]   Weight:  [130 kg (286 lb 9.6 oz)]   SpO2:  [95 %]          Relevant Results    Labs    CBC:   Recent Labs     24  1013 24  1020 23  1041 08/15/22  1033 22  0930 22  1031   WBC 7.7 7.9 9.4 6.4 7.5 6.6   HGB 14.6 14.2 15.0 15.0 14.5 15.3   HCT 45.6 43.7 47.4 45.7 42.5 45.9    208 219 213 226 191   MCV 91 91 92 92 89 88     BMP/CMP:   Recent Labs     24  1013 24  1020 23  1041 08/15/22  1033 22  0930 22  1031 21  1236    140 139 140 137 135* 138   K 4.5 4.5 4.6 4.7 4.1 4.5 4.5    102 99 100 102 99 101   BUN 21 19 19 16 17 19 19   CREATININE 0.93 0.99 0.89 0.89 0.86 0.91 0.83   CO2 27 27 29 29 24 28 27   CALCIUM 9.5 9.7 9.7 9.6 9.6 9.8 9.3   PROT 6.8  --  7.1 7.1 7.2 7.2 7.4   BILITOT 0.6  --  0.7 0.6 0.8 0.8 0.7   ALKPHOS 67  --  63 78 65 81 66   ALT 21  --  20 21 20 30 30   AST 17  --  19 19 19 29 25   GLUCOSE 164* 166* 132* 130* 141* 151* 133*      Lipid Panel:   Recent Labs     24  1013 " "03/24/23  1041 08/15/22  1033 05/27/22  0930 01/07/22  1031 06/24/21  1236 01/21/21  0844 05/21/20  0901 09/13/19  0824 01/31/19  0631 06/21/18  0846 12/15/17  0855   CHOL 209* 186 182 178 184 205* 196 188 190 204* 212* 220*   HDL 36.6 45.4 42.4 36.9* 38.2* 40.7 37.1* 37.8* 35.9* 36.9* 40.3 44.5   CHHDL 5.7 4.1 4.3 4.8 4.8 5.0 5.3* 5.0 5.3* 5.5 5.3* 4.9   VLDL 72* 48* 58* 61* 61* 62* 53* 54* 56* SEE COMMENT 71* 73*   TRIG 359* 241* 291* 303* 305* 309* 266* 268* 282* 658* 355* 363*   NHDL 172* 141 140 141 146 164 159 150 154  --  172 176     Cardiac       No lab exists for component: \"CK\", \"CKMBP\"   Hemoglobin A1C:   Recent Labs     03/08/24  1013 02/07/24  1020 10/06/23  0951 03/24/23  1041 08/15/22  1033 05/27/22  0930 01/07/22  1031 06/24/21  1236 01/21/21  0844 05/21/20  0901 02/20/20  0804 09/13/19  0824   HGBA1C 7.5* 7.6* 7.3* 7.8* 7.3* 7.5* 8.0* 8.0 8.1 6.9 6.8 6.8     TSH/ Free T4:   Recent Labs     03/08/24  1013 03/24/23  1041 08/15/22  1033 05/27/22  0930 01/07/22  1031 06/24/21  1236 09/13/19  0824   TSH 3.18 3.84 4.01* 4.18* 4.63* 3.96 2.20   FREET4  --  1.22 1.03 0.80 0.76 0.74  --      Iron:   Recent Labs     01/30/19  1055   BNP 9     Coag:     ABO: No results found for: \"ABO\"    Past Cardiology Tests (Last 3 Years):  EKG:  Encounter Date: 03/07/24   ECG 12 lead (Clinic Performed)   Result Value    Ventricular Rate 68    Atrial Rate 68    OK Interval 156    QRS Duration 86    QT Interval 390    QTC Calculation(Bazett) 414    P Axis 60    R Axis -13    T Axis 60    QRS Count 12    Q Onset 227    P Onset 149    P Offset 205    T Offset 422    QTC Fredericia 406    Narrative    Normal sinus rhythm  Normal ECG  When compared with ECG of 07-FEB-2024 09:47,  No significant change was found     Echo:  No results found for this or any previous visit.    Ejection Fractions:  No results found for: \"EF\"  Cath:  No results found for this or any previous visit.    Stress Test:  Results for orders placed in visit " on 01/30/19    Nuclear Stress Test    Narrative  MRN: [id number]  Patient Name: PRISCILLA ANGEL    STUDY:  CARDIAC STRESS/REST INJECTION; PART 2 STRESS OR REST (NO CHARGE);  CARDIAC STRESS/REST (MYOCARDIAL PERFUSION/MIBI); 1/31/2019 10:30 am;  2/1/2019 10:09 am; 2/1/2019 10:07 am    INDICATION:  Signs/Symptoms: chest pain.    COMPARISON:  None.    ACCESSION NUMBER(S):  [id number]; [id number]; [id number]    ORDERING CLINICIAN:  MARY FERRELL    TECHNIQUE:  DIVISION OF NUCLEAR MEDICINE  PHARMACOLOGIC STRESS MYOCARDIAL PERFUSION SCAN, TWO DAY PROTOCOL    The patient received an intravenous infusion of 0.4 mg regadenoson  (Lexiscan) followed by an intravenous administration of 30.6 mCi of  Tc-99m Myoview.  Stress phase emission tomographic (SPECT) images of  the myocardium were then acquired. These included ECG-gated views to  assess and quantify ventricular function. On a previous day, the  patient had received a resting administration of 30.2 mCi of Tc-99m  Myoview. Resting SPECT images were acquired.    FINDINGS:  Stress and rest images both demonstrate a normal distribution of  perfusion throughout all LV segments with no sign of ischemia.    TID: 1.10    ECG-gated images demonstrate normal myocardial contractility with an  LV ejection fraction of 57 percent (normal above 45 percent).    Impression  Normal stress myocardial perfusion imaging in response to  pharmacologic stress.    Well preserved ejection function.    Cardiac Imaging:  No results found for this or any previous visit.      === 04/20/23 ===    MR KNEE    - Impression -  1. Complex tearing of the medial meniscus.  2. Tricompartmental degenerative change of the knee, moderate to  severe in the periphery of the medial femorotibial joint space.  3. Small volume knee joint effusion.  4. 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  === 05/18/23 ===    CT LUNG SCREENING LOW DOSE    - Impression -  1. 2 mm right lower lobe nodule, likely benign. Continued  screening  with low-dose noncontrast chest CT in 12 months (from current date)  is recommended.  2. Mild upper lung predominant emphysema.  3. Severe coronary artery calcification. Correlation with coronary  artery disease risk factors.  4. Diffuse hepatic steatosis. PCP evaluation is recommended.      LUNG RADS CATEGORY:  Lung-RADS 2 (Benign Appearance or Indolent behavior): Continue with  annual screening in 12 months,  CT Chest Lung Ca Screen Initial  or Follow up LR1/LR2/Continuation of Screening Low Dose recommended  as per American College [employer] Guidelines Lung-RADS Version  2022.     Assessment/Plan  Assessment/Plan   Principal Problem:    Coronary artery disease involving native coronary artery of native heart with angina pectoris (CMS/HCC)  Active Problems:    Abnormal stress test  -[facility] with Dr. Davis on 3/11/24  -asa 324mg po once prior to procedure       I spent 30 minutes in the professional and overall care of this patient.      Silas Krishna, APRN-CNP, DNP

## 2024-03-12 DIAGNOSIS — M47.816 SPONDYLOSIS WITHOUT MYELOPATHY OR RADICULOPATHY, LUMBAR REGION: ICD-10-CM

## 2024-03-12 DIAGNOSIS — J31.0 RHINITIS, UNSPECIFIED TYPE: ICD-10-CM

## 2024-03-12 DIAGNOSIS — E11.9 TYPE 2 DIABETES MELLITUS WITHOUT COMPLICATION, WITHOUT LONG-TERM CURRENT USE OF INSULIN (MULTI): ICD-10-CM

## 2024-03-12 RX ORDER — LANCETS 33 GAUGE
1 EACH MISCELLANEOUS DAILY
Qty: 100 EACH | Refills: 0 | Status: SHIPPED | OUTPATIENT
Start: 2024-03-12

## 2024-03-12 RX ORDER — METFORMIN HYDROCHLORIDE 500 MG/1
1000 TABLET ORAL 2 TIMES DAILY
Qty: 180 TABLET | Refills: 0 | Status: SHIPPED | OUTPATIENT
Start: 2024-03-12 | End: 2024-03-21 | Stop reason: SDUPTHER

## 2024-03-12 RX ORDER — CYCLOBENZAPRINE HCL 10 MG
10 TABLET ORAL 3 TIMES DAILY PRN
Qty: 90 TABLET | Refills: 0 | Status: SHIPPED | OUTPATIENT
Start: 2024-03-12

## 2024-03-12 RX ORDER — FLUTICASONE PROPIONATE 50 MCG
1 SPRAY, SUSPENSION (ML) NASAL 2 TIMES DAILY PRN
Qty: 16 G | Refills: 1 | Status: SHIPPED | OUTPATIENT
Start: 2024-03-12 | End: 2024-04-16 | Stop reason: SDUPTHER

## 2024-03-13 ENCOUNTER — APPOINTMENT (OUTPATIENT)
Dept: ORTHOPEDIC SURGERY | Facility: CLINIC | Age: 64
End: 2024-03-13
Payer: COMMERCIAL

## 2024-03-19 DIAGNOSIS — E11.9 TYPE 2 DIABETES MELLITUS WITHOUT COMPLICATION, WITHOUT LONG-TERM CURRENT USE OF INSULIN (MULTI): ICD-10-CM

## 2024-03-19 LAB
ATRIAL RATE: 68 BPM
P AXIS: 60 DEGREES
P OFFSET: 205 MS
P ONSET: 149 MS
PR INTERVAL: 156 MS
Q ONSET: 227 MS
QRS COUNT: 12 BEATS
QRS DURATION: 86 MS
QT INTERVAL: 390 MS
QTC CALCULATION(BAZETT): 414 MS
QTC FREDERICIA: 406 MS
R AXIS: -13 DEGREES
T AXIS: 60 DEGREES
T OFFSET: 422 MS
VENTRICULAR RATE: 68 BPM

## 2024-03-20 ENCOUNTER — OFFICE VISIT (OUTPATIENT)
Dept: ORTHOPEDIC SURGERY | Facility: CLINIC | Age: 64
End: 2024-03-20
Payer: COMMERCIAL

## 2024-03-20 DIAGNOSIS — M17.12 PRIMARY OSTEOARTHRITIS OF LEFT KNEE: Primary | ICD-10-CM

## 2024-03-20 PROCEDURE — 3049F LDL-C 100-129 MG/DL: CPT | Performed by: STUDENT IN AN ORGANIZED HEALTH CARE EDUCATION/TRAINING PROGRAM

## 2024-03-20 PROCEDURE — 3051F HG A1C>EQUAL 7.0%<8.0%: CPT | Performed by: STUDENT IN AN ORGANIZED HEALTH CARE EDUCATION/TRAINING PROGRAM

## 2024-03-20 PROCEDURE — 4004F PT TOBACCO SCREEN RCVD TLK: CPT | Performed by: STUDENT IN AN ORGANIZED HEALTH CARE EDUCATION/TRAINING PROGRAM

## 2024-03-20 PROCEDURE — 99214 OFFICE O/P EST MOD 30 MIN: CPT | Performed by: STUDENT IN AN ORGANIZED HEALTH CARE EDUCATION/TRAINING PROGRAM

## 2024-03-21 RX ORDER — METFORMIN HYDROCHLORIDE 500 MG/1
1000 TABLET ORAL 2 TIMES DAILY
Qty: 180 TABLET | Refills: 0 | Status: SHIPPED | OUTPATIENT
Start: 2024-03-21 | End: 2024-04-15

## 2024-03-23 NOTE — PROGRESS NOTES
PRIMARY CARE PHYSICIAN: Richard Garcia MD  REFERRING PROVIDER: No referring provider defined for this encounter.       SUBJECTIVE  CHIEF COMPLAINT: Left knee pain     HPI: Hammad Blackburn is a pleasant 63 y.o. year-old male who is seen today for follow-up evaluation of left knee pain.  The patient was previously scheduled for total knee arthroplasty.  Unfortunately, we had postpone his scheduled surgery due to his hemoglobin A1c being greater than 7.5%.  The patient was also subsequently seen by cardiology who cleared him for surgery.    Mr. Blackburn is severely disabled by his knee pain.  He has tried conservative treatment measures including intra-articular injections, over-the-counter and prescription medications, activity modification and physical therapy.  The patient states that his quality of life is negatively impacted.  He is eager to move forward surgery.      He has not had any recent fall or trauma. He has stopped smoking tobacco.     REVIEW OF SYSTEMS  There has been no interval change in this patient's past medical, surgical, medications, allergies, family history or social history since the most recent visit to a provider within our department.  14 point review of systems was performed, reviewed, and negative except for pertinent positives documented in the history of present illness.    Past Medical History:   Diagnosis Date    Acute abdominal pain 02/20/2024    Allergic otitis media 02/20/2024    Anxiety disorder, unspecified 10/15/2014    Anxiety    Arthritis     Cellulitis of axilla, left 11/16/2023    Chest discomfort 02/20/2024    Chronic pain disorder     COPD (chronic obstructive pulmonary disease) (CMS/[facility])     Cough 02/20/2024    Diabetes mellitus (CMS/[facility])     Elevated prostate specific antigen (PSA)     Elevated prostate specific antigen (PSA)    Fracture of phalanx of toe 02/20/2024    GERD (gastroesophageal reflux disease)     Herpes zoster with complication 02/20/2024    History of  depression 02/20/2024    History of metabolic disorder 02/20/2024    Hyperlipidemia     Hypertension     Melanocytic nevus 02/20/2024    Comment on above: removed by Derm, epidermal papiloma;    Myopia of both eyes 02/20/2024    Nephrolithiasis     Pain in left knee 08/25/2022    Acute pain of left knee    Pain in right elbow 01/04/2022    Right elbow pain    Personal history of diseases of the skin and subcutaneous tissue 10/07/2014    History of urticaria    Personal history of other diseases of the circulatory system 10/07/2014    History of hypertension    Personal history of other diseases of the musculoskeletal system and connective tissue 10/15/2014    Personal history of arthritis    Personal history of other diseases of the respiratory system 11/28/2016    History of sinusitis    Personal history of other endocrine, nutritional and metabolic disease     History of high cholesterol    Personal history of other mental and behavioral disorders 10/15/2014    History of depression    Personal history of other specified conditions 06/02/2015    History of fatigue    Pityriasis versicolor 05/18/2016    Tinea versicolor    Pruritic rash 02/20/2024    Sleep apnea     Unilateral primary osteoarthritis, left knee 08/25/2022    Primary osteoarthritis of left knee        No Known Allergies     Past Surgical History:   Procedure Laterality Date    ADENOIDECTOMY  10/15/2014    Adenoidectomy    APPENDECTOMY  08/08/2016    Appendectomy    CARDIAC CATHETERIZATION N/A 3/11/2024    Procedure: Left Heart Cath with Coronary Angiography and LV;  Surgeon: Alan Davis MD;  Location: [facility];  Service: Cardiovascular;  Laterality: N/A;  Scheduled 3/11/24 @ 10:30 am    COLONOSCOPY  07/08/2013    Complete Colonoscopy    HERNIA REPAIR      OTHER SURGICAL HISTORY  08/08/2016    Knee Arthroscopy With Medial And Lateral Meniscus Repair    TONSILLECTOMY  10/15/2014    Tonsillectomy        Family History   Problem Relation  Name Age of Onset    COPD Mother      Other (htn) Father      Diabetes Father          Social History     Socioeconomic History    Marital status:      Spouse name: Not on file    Number of children: Not on file    Years of education: Not on file    Highest education level: Not on file   Occupational History    Not on file   Tobacco Use    Smoking status: Every Day     Packs/day: 0.25     Years: 40.00     Additional pack years: 0.00     Total pack years: 10.00     Types: Cigarettes    Smokeless tobacco: Never   Vaping Use    Vaping Use: Never used   Substance and Sexual Activity    Alcohol use: Yes     Comment: Social.4 drinks week    Drug use: Never    Sexual activity: Not on file   Other Topics Concern    Not on file   Social History Narrative    Not on file     Social Determinants of Health     Financial Resource Strain: Not on file   Food Insecurity: Not on file   Transportation Needs: Not on file   Physical Activity: Not on file   Stress: Not on file   Social Connections: Not on file   Intimate Partner Violence: Not on file   Housing Stability: Not on file        CURRENT MEDICATIONS:   Current Outpatient Medications   Medication Sig Dispense Refill    allopurinol (Zyloprim) 300 mg tablet TAKE 1 TABLET BY MOUTH EVERY DAY 90 tablet 0    amLODIPine (Norvasc) 10 mg tablet Take 1 tablet (10 mg) by mouth once daily. 90 tablet 0    aspirin 81 mg chewable tablet Chew 1 tablet (81 mg) once daily. 90 tablet 3    budesonide-formoteroL (Symbicort) 160-4.5 mcg/actuation inhaler Inhale 2 puffs 2 times a day as needed.      carvedilol (Coreg) 6.25 mg tablet Take 1 tablet (6.25 mg) by mouth 2 times a day with meals. 180 tablet 3    co-enzyme Q-10 30 mg capsule Take 1 capsule (30 mg) by mouth once daily.      cyclobenzaprine (Flexeril) 10 mg tablet Take 1 tablet (10 mg) by mouth 3 times a day as needed for muscle spasms. 90 tablet 0    evolocumab (Repatha SureClick) 140 mg/mL injection Inject 1 mL (140 mg) under the skin  every 14 (fourteen) days. 6 each 4    fenofibrate (Tricor) 145 mg tablet Take 1 tablet (145 mg) by mouth once daily. Take with food. 90 tablet 0    fluticasone (Flonase) 50 mcg/actuation nasal spray Administer 1 spray into each nostril 2 times a day as needed for rhinitis. 16 g 1    folic acid (Folvite) 1 mg tablet TAKE 1 TABLET BY MOUTH EVERY DAY 90 tablet 0    metFORMIN (Glucophage) 500 mg tablet Take 2 tablets (1,000 mg) by mouth 2 times a day. HOLD FOR 48 HOURS DUE TO CONTRAST. OK TO RESUME 3/14/24 180 tablet 0    omeprazole (PriLOSEC) 40 mg DR capsule TAKE 1 CAPSULE BY MOUTH EVERY DAY 90 capsule 0    OneTouch Delica Plus Lancet 30 gauge misc Apply 1 Lancet topically once daily. 100 each 0    rosuvastatin (Crestor) 40 mg tablet TAKE 1 TABLET BY MOUTH EVERY DAY 90 tablet 0    triamcinolone (Kenalog) 0.1 % ointment Triamcinolone Acetonide 0.1 % External Ointment   Quantity: 80  Refills: 0        Start : 20-Aug-2020   Active      Vascepa 1 gram capsule TAKE 1 CAPSULE (1 G) BY MOUTH ONCE DAILY. 90 capsule 0     No current facility-administered medications for this visit.        OBJECTIVE    PHYSICAL EXAM  There is no height or weight on file to calculate BMI.    General: Well-appearing male in no acute distress.  Awake, alert and oriented.  Pleasant and cooperative.  Respiratory: Non-labored breathing  Mood: Euthymic   Gait: Antalgic  Assistive Device: None     Affected Left Knee  Limb Alignment: Severe varus  ROM: 5-105  Stable to varus and valgus stress at full extension and 30 degrees of flexion  Skin: Intact, no abrasions or draining sinuses  Effusion: None  Quad Strength: 5/5  Hamstring Strength: 5/5  Patella Crepitus: None  Patella Grind: Positive  Tenderness: Medial joint line  Sensation: Intact to light touch distally  Motor function: Able to fire TA, EHL, G/S  Pulses: Palpable DP pulse    IMAGING:  AP / lateral/ mid-flexion/sunrise views: Independent review of left knee x-rays was performed. The findings  were reviewed with the patient. There are severe degenerative changes of the left knee with varus limb alignment. There is joint space narrowing, subchondral sclerosis, and osteophyte formation. No evidence of fracture, AVN, dislocation, osteomyelitis.      Labs: A1C = 7.5%      ASSESSMENT & PLAN    IMPRESSION:  Hammad Blackburn for more than six months has had limited function as well as persistent and severe pain which has negatively impacted the quality of life and interfered with activities of daily living. Under my care or the care of other providers, for greater than the three months, conservative treatment including activity modification, over the counter pain medications, injections, physical therapy and/or recommended home exercise program, have provided only minimal relief. The patient has not had an intra-articular injection in the past 3 months. The option to continue with conservative measures in lieu of arthroplasty was discussed and offered. However, given the failure of these conservative measures and the clinical and radiographic evidence of end-stage arthritis, the patient is a good candidate for an elective total knee arthroplasty. The stated potential benefits include pain relief and improved function were discussed but no guarantees were offered. Some patients may experience improved range of motion but pre-operative range of motion remains the strongest predictor of post-operative range of motion.         PLAN:  I talked with the patient at length about risks, limitations, benefits and alternatives to total knee replacement today. I reviewed risks and concerns including but not limited to implant wear, loosening, implant failure, infection, need for revision surgery, delayed wound healing, deep vein thrombosis, pulmonary embolism, stroke, other cardiopulmonary event, nerve or vascular injury, death and other medical and anesthetic complications of surgery. We talked about the potential for  persistent pain following surgery since there are many possible causes for knee pain. We talked about limited range of motion following knee replacement and the importance of physical therapy and their motivation. In rare cases, temporary but sometimes permanent nerve dysfunction can occur. The patient was advised that knee replacement will only relieve pain that is coming from the knee. I reviewed dislocation precautions and activity restrictions in detail. We discussed the concerns about intraoperative fracture and cemented versus cement-less implants.  We discussed the possible need for a blood transfusion. We discussed the fact that many of our patients are able to go home in 1 day or the same day depending on their health, mobility, pre-op preparation, individual home situation and personal preference. The patient should take our pre-operative teaching class. All of the patients questions were answered. The patient can call my office to schedule surgery and the pre-op teaching class. I told the patient that they should contact their primary care physician to discuss fitness for surgery. The patient was also encouraged to get dental clearance prior to surgery.     The patient acknowledged a clear understanding of these issues and expressed the desire to proceed with surgery once medical clearance has been obtained.    The patient has identified their personal goals of their joint replacement surgery and recovery and we have discussed them. These are documented in our "[employer]" patient engagement platform. In addition, we have discussed the advantages and disadvantages of various implant and fixation options, as well as various surgical approaches. The basic concepts of the joint replacement procedure has been reviewed with the patient and the patient has been provided the opportunity to see an actual implant either in the office or in our pre-op education class.    I also discussed with the patient the risks of  being in the hospital environment in the setting of COVID-19. This risk was considered in light of the overall risk and benefit discussion related to the surgery. The patient's symptoms are severe and worsening, and cause an inability to perform activities of daily living. All possible precautions will be taken and length of stay will be limited as much as possible. The patient is fully aware of this after complete discussion and would like to proceed.    The patient has the following comorbidities that increase the risk of infection following joint replacement surgery: obesity, diabetes, coronary artery disease, recent tobacco use. This was explicitly discussed with the patient and they would like to proceed with surgery.     Surgical plan: Left total knee arthroplasty   Implants: Depuy Plaxo   Special equipment: Preveena Vac   DVT prophylaxis:  Eliquis 2.5 mg BID for 4 weeks   Drugs to stop: none  Allergies to antibiotics: none  Antibiotic Plan: Ancef (+/- vancomycin pending MRSA screen)  Special clearance needed: Per anesthesia team  Candidate for Outpatient: No. Overnight  Meds-to-beds: Yes  Pain medication post op: Standard: Oxycodone, Tramadol and Tylenol  DME Recommendations: [employer], Thigh high compression stocking, Walker or Crutches depending on patient preference     *This office note was dictated using Dragon voice to text software and was not proofread for spelling or grammatical errors *

## 2024-03-25 ENCOUNTER — PHARMACY VISIT (OUTPATIENT)
Dept: PHARMACY | Facility: CLINIC | Age: 64
End: 2024-03-25
Payer: MEDICARE

## 2024-03-25 DIAGNOSIS — M17.12 PRIMARY OSTEOARTHRITIS OF LEFT KNEE: ICD-10-CM

## 2024-03-25 PROCEDURE — RXMED WILLOW AMBULATORY MEDICATION CHARGE

## 2024-04-01 DIAGNOSIS — I10 BENIGN ESSENTIAL HYPERTENSION: ICD-10-CM

## 2024-04-01 DIAGNOSIS — E78.49 OTHER HYPERLIPIDEMIA: ICD-10-CM

## 2024-04-01 RX ORDER — FENOFIBRATE 145 MG/1
145 TABLET, FILM COATED ORAL DAILY
Qty: 90 TABLET | Refills: 0 | Status: SHIPPED | OUTPATIENT
Start: 2024-04-01

## 2024-04-01 RX ORDER — AMLODIPINE BESYLATE 10 MG/1
10 TABLET ORAL DAILY
Qty: 90 TABLET | Refills: 0 | Status: SHIPPED | OUTPATIENT
Start: 2024-04-01

## 2024-04-03 ENCOUNTER — APPOINTMENT (OUTPATIENT)
Dept: PREADMISSION TESTING | Facility: HOSPITAL | Age: 64
End: 2024-04-03
Payer: COMMERCIAL

## 2024-04-04 ENCOUNTER — TELEPHONE (OUTPATIENT)
Dept: PREADMISSION TESTING | Facility: HOSPITAL | Age: 64
End: 2024-04-04
Payer: COMMERCIAL

## 2024-04-08 DIAGNOSIS — K21.9 GASTRO-ESOPHAGEAL REFLUX DISEASE WITHOUT ESOPHAGITIS: ICD-10-CM

## 2024-04-08 RX ORDER — OMEPRAZOLE 40 MG/1
40 CAPSULE, DELAYED RELEASE ORAL DAILY
Qty: 90 CAPSULE | Refills: 0 | Status: SHIPPED | OUTPATIENT
Start: 2024-04-08

## 2024-04-11 ENCOUNTER — OFFICE VISIT (OUTPATIENT)
Dept: CARDIOLOGY | Facility: HOSPITAL | Age: 64
End: 2024-04-11
Payer: COMMERCIAL

## 2024-04-11 ENCOUNTER — LAB (OUTPATIENT)
Dept: LAB | Facility: LAB | Age: 64
End: 2024-04-11
Payer: COMMERCIAL

## 2024-04-11 ENCOUNTER — PRE-ADMISSION TESTING (OUTPATIENT)
Dept: PREADMISSION TESTING | Facility: HOSPITAL | Age: 64
End: 2024-04-11
Payer: COMMERCIAL

## 2024-04-11 VITALS
SYSTOLIC BLOOD PRESSURE: 146 MMHG | OXYGEN SATURATION: 94 % | HEIGHT: 74 IN | RESPIRATION RATE: 20 BRPM | BODY MASS INDEX: 36.1 KG/M2 | HEART RATE: 67 BPM | TEMPERATURE: 103.3 F | DIASTOLIC BLOOD PRESSURE: 68 MMHG | WEIGHT: 281.31 LBS

## 2024-04-11 VITALS
SYSTOLIC BLOOD PRESSURE: 172 MMHG | HEART RATE: 69 BPM | WEIGHT: 283 LBS | OXYGEN SATURATION: 93 % | HEIGHT: 74 IN | BODY MASS INDEX: 36.32 KG/M2 | DIASTOLIC BLOOD PRESSURE: 83 MMHG

## 2024-04-11 DIAGNOSIS — I25.10 CORONARY ARTERY DISEASE INVOLVING NATIVE CORONARY ARTERY OF NATIVE HEART WITHOUT ANGINA PECTORIS: ICD-10-CM

## 2024-04-11 DIAGNOSIS — E78.00 PURE HYPERCHOLESTEROLEMIA: Primary | ICD-10-CM

## 2024-04-11 DIAGNOSIS — Z01.818 PREOP TESTING: ICD-10-CM

## 2024-04-11 DIAGNOSIS — J31.0 RHINITIS, UNSPECIFIED TYPE: ICD-10-CM

## 2024-04-11 DIAGNOSIS — E78.5 HYPERLIPIDEMIA, UNSPECIFIED HYPERLIPIDEMIA TYPE: ICD-10-CM

## 2024-04-11 DIAGNOSIS — I10 BENIGN ESSENTIAL HYPERTENSION: ICD-10-CM

## 2024-04-11 DIAGNOSIS — Z01.818 PREOP TESTING: Primary | ICD-10-CM

## 2024-04-11 LAB
EST. AVERAGE GLUCOSE BLD GHB EST-MCNC: 171 MG/DL
HBA1C MFR BLD: 7.6 %

## 2024-04-11 PROCEDURE — 3049F LDL-C 100-129 MG/DL: CPT | Performed by: NURSE PRACTITIONER

## 2024-04-11 PROCEDURE — 3077F SYST BP >= 140 MM HG: CPT | Performed by: NURSE PRACTITIONER

## 2024-04-11 PROCEDURE — 99214 OFFICE O/P EST MOD 30 MIN: CPT | Performed by: NURSE PRACTITIONER

## 2024-04-11 PROCEDURE — 87081 CULTURE SCREEN ONLY: CPT | Mod: AHULAB | Performed by: PHYSICIAN ASSISTANT

## 2024-04-11 PROCEDURE — 83036 HEMOGLOBIN GLYCOSYLATED A1C: CPT

## 2024-04-11 PROCEDURE — 3051F HG A1C>EQUAL 7.0%<8.0%: CPT | Performed by: NURSE PRACTITIONER

## 2024-04-11 PROCEDURE — 3079F DIAST BP 80-89 MM HG: CPT | Performed by: NURSE PRACTITIONER

## 2024-04-11 PROCEDURE — 36415 COLL VENOUS BLD VENIPUNCTURE: CPT

## 2024-04-11 PROCEDURE — 4004F PT TOBACCO SCREEN RCVD TLK: CPT | Performed by: NURSE PRACTITIONER

## 2024-04-11 RX ORDER — CHLORHEXIDINE GLUCONATE ORAL RINSE 1.2 MG/ML
SOLUTION DENTAL
Qty: 475 ML | Refills: 0 | Status: SHIPPED | OUTPATIENT
Start: 2024-04-11

## 2024-04-11 RX ORDER — ICOSAPENT ETHYL 1000 MG/1
CAPSULE ORAL DAILY
Qty: 90 CAPSULE | Refills: 0 | Status: SHIPPED | OUTPATIENT
Start: 2024-04-11

## 2024-04-11 RX ORDER — CARVEDILOL 12.5 MG/1
12.5 TABLET ORAL
Qty: 180 TABLET | Refills: 3 | Status: SHIPPED | OUTPATIENT
Start: 2024-04-11 | End: 2025-04-11

## 2024-04-11 ASSESSMENT — ENCOUNTER SYMPTOMS
OCCASIONAL FEELINGS OF UNSTEADINESS: 0
LOSS OF SENSATION IN FEET: 0
EYE PAIN: 0
DYSPNEA WITH EXERTION: 0
BLOOD IN STOOL: 0
ARTHRALGIAS: 1
ABDOMINAL PAIN: 0
NECK STIFFNESS: 0
NUMBNESS: 0
VISUAL CHANGE: 0
DEPRESSION: 0
SKIN CHANGES: 0
CONSTIPATION: 0
DOUBLE VISION: 0
WHEEZING: 0
NECK PAIN: 0
BRUISES/BLEEDS EASILY: 0
CHILLS: 0
FEVER: 0
RHINORRHEA: 0
ABDOMINAL DISTENTION: 0
MYALGIAS: 0
HEMOPTYSIS: 0
DIFFICULTY URINATING: 0
LIMITED RANGE OF MOTION: 1
EXCESSIVE BLEEDING: 0
LIGHT-HEADEDNESS: 0
UNEXPECTED WEIGHT CHANGE: 0
SHORTNESS OF BREATH: 0
PALPITATIONS: 0
CONFUSION: 0
DYSPNEA AT REST: 0
TROUBLE SWALLOWING: 0
DIARRHEA: 0
WOUND: 0
WEAKNESS: 0
SINUS CONGESTION: 1
DYSURIA: 0
COUGH: 0
NAUSEA: 0
EYE DISCHARGE: 0
VOMITING: 0

## 2024-04-11 NOTE — CPM/PAT H&P
[facility]/PAT Evaluation       Name: Hammad Blackburn (Hammad Blackburn)  /Age: 1960/63 y.o.       Date of Consult: 24    Referring Provider: Dr. Ponce Robles    Surgery, Date, and Length: Left Knee Total Arthroplasty - Left , 24, 180MIN    Hammad Blackburn is a 63 year-old male who [facility] for perioperative risk assessment prior to surgery.    Patient presents with a primary diagnosis of left knee osteoarthritis. Pt refers to left knee pain x 2-3 years.  He has tried steroid joint injections which provided about 1-2 months of pain relief.  He has tried ibuprofen which provides some mild pain relief. Pt refers walking up stairs or up incline and getting up from seated position makes pain worse.  Rest and elevation makes pain better.  Pt denies any falls.  Pt wishes to proceed with surgery as planned. Of note, he was scheduled on two other occasions for this same TJR and was rescheduled due to elevated A1c level.    This note was created in part upon personal review of patient's medical records.      Patient is scheduled to have Left Knee Total Arthroplasty - Left      Pt denies any past history of anesthetic complications such as PONV, awareness, prolonged sedation, dental damage, aspiration, cardiac arrest, difficult intubation, difficult I.V. access or unexpected hospital admissions.  NO malignant hyperthermia and or pseudocholinesterase deficiency.  No history of blood transfusions     The patient is not a [employer] and will accept blood and blood products if medically indicated.   Type and screen sent.     Past Medical History:   Diagnosis Date    Abnormal stress test     followed by heart cath    Anxiety disorder, unspecified     Arthritis     Cardiology follow-up encounter     Scheduled with JACOBO Lay NP for 2024    COPD (chronic obstructive pulmonary disease) (CMS/[facility])     Coronary atherosclerosis     Diabetes mellitus (CMS/[facility])     Elevated prostate specific antigen (PSA)      GERD (gastroesophageal reflux disease)     H/O percutaneous left heart catheterization 03/11/2024    1. Severe branch vessel CAD (D1 and OM1) in a right dominant system.  2.  of small inferior branch of OM1.  3. Elevated LVEDP.  4. No evidence of aortic stenosis.    History of depression     Hyperlipidemia     Hypertension     Nephrolithiasis     Sleep apnea     Tobacco use     Unilateral primary osteoarthritis, left knee        Past Surgical History:   Procedure Laterality Date    ADENOIDECTOMY      APPENDECTOMY      CARDIAC CATHETERIZATION N/A 03/11/2024    Procedure: Left Heart Cath with Coronary Angiography and LV;  Surgeon: Alan Davis MD;  Location: [facility];  Service: Cardiovascular;  Laterality: N/A;  Scheduled 3/11/24 @ 10:30 am    COLONOSCOPY      HERNIA REPAIR      KNEE SURGERY      Knee Arthroscopy With Medial And Lateral Meniscus Repair    TONSILLECTOMY         Patient  has no history on file for sexual activity.    Family History   Problem Relation Name Age of Onset    COPD Mother      Hypertension Father      Diabetes Father      Coronary artery disease Father  40 - 49        s/p CABG     Social History     Socioeconomic History    Marital status:      Spouse name: Not on file    Number of children: Not on file    Years of education: Not on file    Highest education level: Not on file   Occupational History    Not on file   Tobacco Use    Smoking status: Every Day     Current packs/day: 0.50     Average packs/day: 0.5 packs/day for 43.3 years (21.6 ttl pk-yrs)     Types: Cigarettes     Start date: 1981    Smokeless tobacco: Never    Tobacco comments:     continues to smoke 3 to 5 cigarettes a day   Vaping Use    Vaping status: Never Used   Substance and Sexual Activity    Alcohol use: Yes     Comment: 4-6 alcoholic beverages a week    Drug use: Never    Sexual activity: Not on file   Other Topics Concern    Not on file   Social History Narrative    Not on file     Social  Determinants of Health     Financial Resource Strain: Not on file   Food Insecurity: Not on file   Transportation Needs: Not on file   Physical Activity: Not on file   Stress: Not on file   Social Connections: Not on file   Intimate Partner Violence: Not on file   Housing Stability: Not on file        No Known Allergies      Current Outpatient Medications:     allopurinol (Zyloprim) 300 mg tablet, TAKE 1 TABLET BY MOUTH EVERY DAY, Disp: 90 tablet, Rfl: 0    amLODIPine (Norvasc) 10 mg tablet, TAKE 1 TABLET BY MOUTH EVERY DAY, Disp: 90 tablet, Rfl: 0    aspirin 81 mg chewable tablet, Chew 1 tablet (81 mg) once daily., Disp: 90 tablet, Rfl: 3    budesonide-formoteroL (Symbicort) 160-4.5 mcg/actuation inhaler, Inhale 2 puffs 2 times a day as needed., Disp: , Rfl:     carvedilol (Coreg) 12.5 mg tablet, Take 1 tablet (12.5 mg) by mouth 2 times a day with meals., Disp: 180 tablet, Rfl: 3    co-enzyme Q-10 30 mg capsule, Take 1 capsule (30 mg) by mouth once daily., Disp: , Rfl:     cyclobenzaprine (Flexeril) 10 mg tablet, Take 1 tablet (10 mg) by mouth 3 times a day as needed for muscle spasms., Disp: 90 tablet, Rfl: 0    fenofibrate (Tricor) 145 mg tablet, TAKE 1 TABLET (145 MG) BY MOUTH ONCE DAILY. TAKE WITH FOOD., Disp: 90 tablet, Rfl: 0    fluticasone (Flonase) 50 mcg/actuation nasal spray, Administer 1 spray into each nostril 2 times a day as needed for rhinitis., Disp: 16 g, Rfl: 1    folic acid (Folvite) 1 mg tablet, TAKE 1 TABLET BY MOUTH EVERY DAY, Disp: 90 tablet, Rfl: 0    metFORMIN (Glucophage) 500 mg tablet, Take 2 tablets (1,000 mg) by mouth 2 times a day. HOLD FOR 48 HOURS DUE TO CONTRAST. OK TO RESUME 3/14/24, Disp: 180 tablet, Rfl: 0    omeprazole (PriLOSEC) 40 mg DR capsule, TAKE 1 CAPSULE BY MOUTH EVERY DAY, Disp: 90 capsule, Rfl: 0    rosuvastatin (Crestor) 40 mg tablet, TAKE 1 TABLET BY MOUTH EVERY DAY, Disp: 90 tablet, Rfl: 0    Vascepa 1 gram capsule, TAKE 1 CAPSULE (1 G) BY MOUTH ONCE DAILY., Disp:  90 capsule, Rfl: 0    chlorhexidine (Peridex) 0.12 % solution, Swish for 30 seconds and spit 15mL of solution the night before and morning of surgery, Disp: 475 mL, Rfl: 0    evolocumab (Repatha SureClick) 140 mg/mL injection, Inject 1 mL (140 mg) under the skin every 14 (fourteen) days. (Patient not taking: Reported on 4/11/2024), Disp: 6 each, Rfl: 4    OneTouch Delica Plus Lancet 30 gauge misc, Apply 1 Lancet topically once daily., Disp: 100 each, Rfl: 0    triamcinolone (Kenalog) 0.1 % ointment, Triamcinolone Acetonide 0.1 % External Ointment  Quantity: 80  Refills: 0      Start : 20-Aug-2020  Active, Disp: , Rfl:       PAT ROS:   Constitutional:    no fever   no chills   no unexpected weight change  Neuro/Psych:    no numbness   no weakness   no light-headedness   no confusion  Eyes:    no discharge   no pain   no vision loss   no diplopia   no visual disturbance   use of corrective lenses  Ears:    no ear pain   no hearing loss   no tinnitus  Nose:    no nasal discharge   sinus congestion   no epistaxis  Mouth:    no dental issues   no mouth pain   no oral bleeding   no mouth lesions  Throat:    no throat pain   no dysphagia  Neck:    no neck pain   no neck stiffness  Cardio:    Functional 4 Mets. Patient denies SOB walking up 2 flights of stairs   Bowling, golfing, walking   no chest pain   no palpitations   no peripheral edema   no dyspnea   no SPICER  Respiratory:    no cough   no wheezing   no hemoptysis   no shortness of breath  Endocrine:    no cold intolerance   no heat intolerance  GI:    no abdominal distention   no abdominal pain   no constipation   no diarrhea   no nausea   no vomiting   no blood in stool  :    no difficulty urinating   no dysuria   no oliguria  Musculoskeletal:    arthralgias (left knee)   no myalgias   decreased ROM (left knee)  Hematologic:    does not bruise/bleed easily   no excessive bleeding   no history of blood transfusion   no blood clots  Skin:   no skin changes   no  sores/wound   no rash      Physical Exam  Constitutional:       General: He is not in acute distress.     Appearance: Normal appearance. He is not ill-appearing, toxic-appearing or diaphoretic.   HENT:      Head: Normocephalic and atraumatic.      Nose: Nose normal. No rhinorrhea.      Mouth/Throat:      Pharynx: No oropharyngeal exudate.   Eyes:      Extraocular Movements: Extraocular movements intact.      Conjunctiva/sclera: Conjunctivae normal.   Cardiovascular:      Rate and Rhythm: Normal rate and regular rhythm.      Heart sounds: No murmur heard.     No friction rub. No gallop.      Comments: Functional 4 Mets. Patient denies SOB walking up 2 flights of stairs     Pulmonary:      Effort: Pulmonary effort is normal. No respiratory distress.      Breath sounds: Normal breath sounds. No stridor. No wheezing or rhonchi.   Abdominal:      General: Bowel sounds are normal. There is no distension.      Palpations: Abdomen is soft. There is no mass.      Tenderness: There is no abdominal tenderness. There is no guarding or rebound.      Hernia: No hernia is present.   Musculoskeletal:         General: Tenderness (left knee; pain with ext/flex) present. No swelling, deformity or signs of injury.      Cervical back: Normal range of motion and neck supple. No rigidity or tenderness.   Skin:     General: Skin is warm and dry.      Coloration: Skin is not jaundiced or pale.      Findings: No bruising, erythema, lesion or rash.   Neurological:      General: No focal deficit present.      Mental Status: He is alert and oriented to person, place, and time.      Cranial Nerves: No cranial nerve deficit.      Sensory: No sensory deficit.      Motor: No weakness.      Coordination: Coordination normal.   Psychiatric:         Mood and Affect: Mood normal.         Behavior: Behavior normal.          PAT AIRWAY:   Airway:     Mallampati::  III    Neck ROM::  Full   No broken teeth, no dentures and no missing teeth          Visit  "Vitals  /68   Pulse 67   Temp 39.6 °C (103.3 °F)   Resp 20   Ht 1.88 m (6' 2\")   Wt 128 kg (281 lb 4.9 oz)   SpO2 94%   BMI 36.12 kg/m²   Smoking Status Every Day   BSA 2.59 m²      LABS:  Lab Results   Component Value Date    WBC 7.7 03/08/2024    HGB 14.6 03/08/2024    HCT 45.6 03/08/2024    MCV 91 03/08/2024     03/08/2024      Lab Results   Component Value Date    GLUCOSE 164 (H) 03/08/2024    CALCIUM 9.5 03/08/2024     03/08/2024    K 4.5 03/08/2024    CO2 27 03/08/2024     03/08/2024    BUN 21 03/08/2024    CREATININE 0.93 03/08/2024      Lab Results   Component Value Date    ALT 21 03/08/2024    AST 17 03/08/2024    ALKPHOS 67 03/08/2024    BILITOT 0.6 03/08/2024      Lab Results   Component Value Date    HGBA1C 7.5 (H) 03/08/2024      Lab Results   Component Value Date    HGBA1C 7.6 (H) 04/11/2024      EKG 3/7/24  Normal sinus rhythm  Normal ECG    Cardiac cath 3/11//24  Coronary Lesion Summary:  Vessel            Stenosis      Vessel Segment  LAD           40-50% stenosis         mid  1st Diagonal    40% stenosis    proximal to mid  1st Diagonal    80% stenosis          mid  OM 1            70% stenosis       proximal  Ramus        10 to 30% stenosis    proximal  RCA             30% stenosis        distal    Recommendations:  Maximize medical therapy.  Consider PCI if symptoms despite medical therapy.     _________________________________________________________________________  CONCLUSIONS:   1. Severe branch vessel CAD (D1 and OM1) in a right dominant system.   2.  of small inferior branch of OM1.   3. Elevated LVEDP.   4. No evidence of aortic stenosis.     Stress test 1/31/19  IMPRESSION:  Normal stress myocardial perfusion imaging in response to  pharmacologic stress.     Well preserved ejection function.    ECHO 1/31/19  CONCLUSIONS:   1. The left ventricular systolic function is normal with a 60-65% estimated ejection fraction.   2. Moderately increased left ventricular " "septal thickness.   3. Spectral Doppler shows an impaired relaxation pattern of left ventricular diastolic filling.   4. RVSP within normal limits.   5. Aortic valve stenosis is not present.   6. The pulmonary artery is not well visualized.      Assessment and Plan:     Patient is a 63-year-old male scheduled for a Left Knee Total Arthroplasty - Left with Dr. Ponce Robles on 4/25/24.    Patient has no active cardiac symptoms.   Patient denies any chest pain, tightness, heaviness, pressure, radiating pain, palpitations, irregular heartbeats, lightheadedness, cough, congestion, shortness of breath, SPICER, PND, near syncope, weight loss or gain.     RCRI  0  , 3.9 % Risk of MACE    Cardiac:  HTN - cont carvedilol and amlodipine on dos  Encouraged lifestyle modifications, low-sodium diet, and increase activity as tolerated.  Monitor BP and follow up with managing physician for readings sustaining >140/90.     HLD - hold vascepa dos; hold fenofibrate 24 hours before surgery; cont statin on dos     CAD - cardiac cath 3/11/24;   1st Diagonal    80% stenosis          mid  OM 1            70% stenosis       proximal    Cardiac clearance pending appointment with Noreen Lay PA-C 4/11/24:    \"He is at acceptable risk for knee surgery. He should remain on Aspirin without interruption perioperatively.  No further cardiac testing required. He should continue his other cardiovascular medications. He will follow up in 6 months. \"    Pulmonary:  CHAD - Known CHAD- No CPAP. Recommend prioritizing  nonopioid analgesic techniques (regional and local anesthesia, nonsteroidal medications, etc) before the administration of opioids and close monitoring for hypoventilation after surgery due to CHAD. Increased vigilance is recommended with the use of narcotics due to an increased risk for opioid induced respiratory depression     COPD - cont inhalers as prescribed, including dos    Nicotine dependence - Patient has history of nicotine " dependency. Smoking cessation education was provided to the patient.Cessation encouraged. - Physiological and physical aspects of tobacco addiction as well as strategies for quitting were discussed. - Counseling was given focusing on the harmful effects of this addiction especially given the patient's medical condition(s) which will be worsened because of the chemicals in tobacco      Endocrine:  DMII - hold metformin on dos   HgbA1c 3/8/24 = 7.5%  Hgba1c 4/11/24 = 7.6%    Hematology:  Patient instructed to ambulate as soon as possible postoperatively to decrease thromboembolic risk.   Initiate mechanical DVT prophylaxis as soon as possible and initiate chemical prophylaxis when deemed safe from a bleeding standpoint post surgery.     LABS: MRSA and A1c ordered    Followup: MRSA, A1c pending    Addendum 4/12/24:  A1c results reviewed and do not meet [facility] criteria to proceed with TJR surgery as planned.  Dr. Ponce Robles has been notified.    STOP BANG: male, HTN, obese, >50 = 4 (+CHAD)    Caprini: 8    Risk assessment complete.  Patient is scheduled for a intermediate surgical risk procedure.        Preoperative medication instructions were provided and reviewed with the patient.  Any additional testing or evaluation was explained to the patient.  Nothing by mouth instructions were discussed and patient's questions were answered prior to conclusion to this encounter.  Patient verbalized understanding of preoperative instructions given in preadmission testing; discharge instructions available in EMR.    This note was dictated by a speech recognition.  Minor errors may have been detected in a speech recognition.

## 2024-04-11 NOTE — PREPROCEDURE INSTRUCTIONS
Medication List            Accurate as of April 11, 2024 12:27 PM. Always use your most recent med list.                allopurinol 300 mg tablet  Commonly known as: Zyloprim  TAKE 1 TABLET BY MOUTH EVERY DAY  Medication Adjustments for Surgery: Take morning of surgery with sip of water, no other fluids     amLODIPine 10 mg tablet  Commonly known as: Norvasc  TAKE 1 TABLET BY MOUTH EVERY DAY  Medication Adjustments for Surgery: Take morning of surgery with sip of water, no other fluids     aspirin 81 mg chewable tablet  Chew 1 tablet (81 mg) once daily.  Medication Adjustments for Surgery: Take morning of surgery with sip of water, no other fluids     carvedilol 12.5 mg tablet  Commonly known as: Coreg  Take 1 tablet (12.5 mg) by mouth 2 times a day with meals.  Medication Adjustments for Surgery: Take morning of surgery with sip of water, no other fluids     chlorhexidine 0.12 % solution  Commonly known as: Peridex  Swish for 30 seconds and spit 15mL of solution the night before and morning of surgery     co-enzyme Q-10 30 mg capsule  Medication Adjustments for Surgery: Stop 7 days before surgery     cyclobenzaprine 10 mg tablet  Commonly known as: Flexeril  Take 1 tablet (10 mg) by mouth 3 times a day as needed for muscle spasms.  Medication Adjustments for Surgery: Take morning of surgery with sip of water, no other fluids     fenofibrate 145 mg tablet  Commonly known as: Tricor  TAKE 1 TABLET (145 MG) BY MOUTH ONCE DAILY. TAKE WITH FOOD.  Medication Adjustments for Surgery: Stop 1 day before surgery     fluticasone 50 mcg/actuation nasal spray  Commonly known as: Flonase  Administer 1 spray into each nostril 2 times a day as needed for rhinitis.  Notes to patient: Ok to use morning of surgery if needed     folic acid 1 mg tablet  Commonly known as: Folvite  TAKE 1 TABLET BY MOUTH EVERY DAY  Medication Adjustments for Surgery: Continue until night before surgery     metFORMIN 500 mg tablet  Commonly known as:  Glucophage  Take 2 tablets (1,000 mg) by mouth 2 times a day. HOLD FOR 48 HOURS DUE TO CONTRAST. OK TO RESUME 3/14/24  Medication Adjustments for Surgery: Continue until night before surgery     omeprazole 40 mg DR capsule  Commonly known as: PriLOSEC  TAKE 1 CAPSULE BY MOUTH EVERY DAY  Medication Adjustments for Surgery: Take morning of surgery with sip of water, no other fluids     OneTouch Delica Plus Lancet 30 gauge misc  Generic drug: lancets  Apply 1 Lancet topically once daily.     Repatha SureClick 140 mg/mL injection  Generic drug: evolocumab  Inject 1 mL (140 mg) under the skin every 14 (fourteen) days.  Medication Adjustments for Surgery: Stop 1 day before surgery  Notes to patient: Patient states he is not taking this medication     rosuvastatin 40 mg tablet  Commonly known as: Crestor  TAKE 1 TABLET BY MOUTH EVERY DAY  Medication Adjustments for Surgery: Take morning of surgery with sip of water, no other fluids     Symbicort 160-4.5 mcg/actuation inhaler  Generic drug: budesonide-formoteroL  Notes to patient: Ok to use morning of surgery if needed     triamcinolone 0.1 % ointment  Commonly known as: Kenalog  Medication Adjustments for Surgery: Continue until night before surgery     Vascepa 1 gram capsule  Generic drug: icosapent ethyL  TAKE 1 CAPSULE (1 G) BY MOUTH ONCE DAILY.  Medication Adjustments for Surgery: Stop 1 day before surgery                          **Concerning above medication instructions, if medication is normally taken at night, continue normal schedule.**  **DO NOT TAKE NIGHT PRIOR AND MORNING OF SURGERY**    CONTACT SURGEON'S OFFICE IF YOU DEVELOP:  * Fever = 100.4 F   * New respiratory symptoms (e.g. cough, shortness of breath, respiratory distress, sore throat)  * Recent loss of taste or smell  *Flu like symptoms such as headache, fatigue or gastrointestinal symptoms  * You develop any open sores, shingles, burning or painful urination   AND/OR:  * You no longer wish to have the  surgery.  * Any other personal circumstances change that may lead to the need to cancel or defer this surgery.  *You were admitted to any hospital within one week of your planned procedure.    SMOKING:  *Quitting smoking can make a huge difference to your health and recovery from surgery.    *If you need help with quitting, call 2-292-QUIT-NOW.    THE DAY BEFORE SURGERY:   Nothing by mouth (no solids or liquids) after midnight.   If diabetic, please check fasting blood sugar upon waking up.    If fasting sugar is <80 mg/dl, please drink 100ml/3oz of apple juice no later than 2 hours prior to arrival time.      SURGICAL TIME  *You will be contacted between 2 p.m. and 6 p.m. the business day before your surgery with your arrival time.  *If you haven't received a call by 6pm, call [phone].  *Scheduled surgery times may change and you will be notified if this occurs-check your personal voicemail for any updates.    ON THE MORNING OF SURGERY:  *Wear comfortable, loose fitting clothing.   *Do not use moisturizers, creams, lotions or perfume.  *All jewelry and valuables should be left at home.  *Prosthetic devices such as contact lenses, hearing aids, dentures, eyelash extensions, hairpins and body piercing must be removed before surgery.    BRING WITH YOU:  *Photo ID and insurance card  *Current list of medicines and allergies  *Pacemaker/Defibrillator/Heart stent cards  *CPAP machine and mask  *Slings/splints/crutches  *Copy of your complete Advanced Directive/DHPOA-if applicable  *Neurostimulator implant remote    PARKING AND ARRIVAL:  *Check in at the Main Entrance desk and let them know you are here for surgery.  *You will be directed to the 2nd floor surgical waiting area.    AFTER OUTPATIENT SURGERY:  *A responsible adult MUST accompany you at the time of discharge and stay with you for 24 hours after your surgery.  *You may NOT drive yourself home after surgery.  *You may use a taxi or ride sharing service  (Lyft, Uber) to return home ONLY if you are accompanied by a friend or family member.  *Instructions for resuming your medications will be provided by your surgeon.

## 2024-04-11 NOTE — PATIENT INSTRUCTIONS
Increase Carvedilol to 12.5 mg twice a day  Recommend to start Repatha  Continue all other cardiovascular medications  Follow up in 6 months  Continue to try to quit smoking  You should stay on Aspirin without interruption for knee surgery  Check fasting lipid panel in 3 months

## 2024-04-11 NOTE — PROGRESS NOTES
Primary Care Physician: Richard Garcia MD  Date of Visit: 04/11/2024 10:20 AM EDT  Location of visit: [facility]     Chief Complaint:   Chief Complaint   Patient presents with    Follow-up     HTN  HLD  CAD        HPI / Summary:   Hammad Blackburn is a 63 y.o. male presents for followup. Seen in collaboration with Dr. Davis. He underwent recent left heart catheterization that showed severe branch vessel coronary disease. He reports cold like symptoms since Saturday. No fever. No colored phlegm. His activity is most limited by knee pain for which he hoping to get knee replacement in the near future. Last week he did walk 1.7 miles twice without chest pain or dyspnea. His chronic lower extremity edema is at baseline. He is able to ambulate up his basement stairs 1 flight without chest pain or dyspnea.  He is trying to quit smoking. He has been smoking 1-2 cigarettes per day. He did not start Repatha as he was concerned it would raise his blood sugar prior to having knee surgery as this was listed as a possible side effect on medication package. Denies chest pain, dyspnea, orthopnea, pnd, lightheadedness, dizziness, syncope, palpitations, or bleeding issues.                Past Medical History:  Past Medical History:   Diagnosis Date    Abnormal stress test     followed by heart cath    Anxiety disorder, unspecified     Arthritis     Cardiology follow-up encounter     Scheduled with JACOBO Lay NP for 4/11/2024    COPD (chronic obstructive pulmonary disease) (CMS/formerly [facility])     Coronary atherosclerosis     Diabetes mellitus (CMS/formerly [facility])     Elevated prostate specific antigen (PSA)     GERD (gastroesophageal reflux disease)     H/O percutaneous left heart catheterization 03/11/2024    1. Severe branch vessel CAD (D1 and OM1) in a right dominant system.  2.  of small inferior branch of OM1.  3. Elevated LVEDP.  4. No evidence of aortic stenosis.    History of depression     Hyperlipidemia     Hypertension      Nephrolithiasis     Sleep apnea     Tobacco use     Unilateral primary osteoarthritis, left knee         Past Surgical History:  Past Surgical History:   Procedure Laterality Date    ADENOIDECTOMY      APPENDECTOMY      CARDIAC CATHETERIZATION N/A 03/11/2024    Procedure: Left Heart Cath with Coronary Angiography and LV;  Surgeon: Alan Davis MD;  Location: [facility] Cardiac Cath Lab;  Service: Cardiovascular;  Laterality: N/A;  Scheduled 3/11/24 @ 10:30 am    COLONOSCOPY      HERNIA REPAIR      KNEE SURGERY      Knee Arthroscopy With Medial And Lateral Meniscus Repair    TONSILLECTOMY            Social History:   reports that he has been smoking cigarettes. He started smoking about 43 years ago. He has a 21.6 pack-year smoking history. He has never used smokeless tobacco. He reports current alcohol use. He reports that he does not use drugs.     Family History:  family history includes COPD in his mother; Coronary artery disease (age of onset: 40 - 49) in his father; Diabetes in his father; Hypertension in his father.      Allergies:  No Known Allergies    Outpatient Medications:  Current Outpatient Medications   Medication Instructions    allopurinol (ZYLOPRIM) 300 mg, oral, Daily    amLODIPine (NORVASC) 10 mg, oral, Daily    aspirin 81 mg, oral, Daily    budesonide-formoteroL (Symbicort) 160-4.5 mcg/actuation inhaler 2 puffs, inhalation, 2 times daily PRN    carvedilol (COREG) 6.25 mg, oral, 2 times daily with meals    co-enzyme Q-10 30 mg, oral, Daily    cyclobenzaprine (FLEXERIL) 10 mg, oral, 3 times daily PRN    fenofibrate (TRICOR) 145 mg, oral, Daily, Take with food.    fluticasone (Flonase) 50 mcg/actuation nasal spray 1 spray, Each Nostril, 2 times daily PRN    folic acid (FOLVITE) 1 mg, oral, Daily    metFORMIN (GLUCOPHAGE) 1,000 mg, oral, 2 times daily, HOLD FOR 48 HOURS DUE TO CONTRAST. OK TO RESUME 3/14/24    omeprazole (PRILOSEC) 40 mg, oral, Daily    OneTouch Delica Plus Lancet 30 gauge misc 1 Lancet,  "Topical, Daily    Repatha SureClick 140 mg, subcutaneous, Every 14 days    rosuvastatin (CRESTOR) 40 mg, oral, Daily    triamcinolone (Kenalog) 0.1 % ointment Triamcinolone Acetonide 0.1 % External Ointment   Quantity: 80  Refills: 0        Start : 20-Aug-2020   Active    Vascepa 1 gram capsule oral, Daily       Physical Exam:  Vitals:    04/11/24 1015   BP: 172/83   BP Location: Left arm   Patient Position: Sitting   BP Cuff Size: Adult   Pulse: 69   SpO2: 93%   Weight: 128 kg (283 lb)   Height: 1.88 m (6' 2\")     Wt Readings from Last 5 Encounters:   04/11/24 128 kg (283 lb)   03/11/24 130 kg (286 lb 9.6 oz)   03/07/24 130 kg (286 lb)   02/29/24 128 kg (282 lb)   02/20/24 128 kg (282 lb)     Body mass index is 36.34 kg/m².     GENERAL: alert, cooperative, pleasant, in no acute distress  SKIN: warm and dry  NECK: Normal JVD, negative HJR  CARDIAC: Regular rate and rhythm with 1/6 early peaking systolic murmur, no rubs or gallops  CHEST: Normal respiratory efforts, lungs clear to auscultation bilaterally.  ABDOMEN: soft, nontender, nondistended  EXTREMITIES: Trace bilateral lower extremity edema, +2 palpable right radial pulse without hematoma or tenderness     Last Labs:  Recent Labs     03/08/24  1013 02/07/24  1020 03/24/23  1041   WBC 7.7 7.9 9.4   HGB 14.6 14.2 15.0   HCT 45.6 43.7 47.4    208 219   MCV 91 91 92     Recent Labs     03/08/24  1013 02/07/24  1020 03/24/23  1041    140 139   K 4.5 4.5 4.6    102 99   BUN 21 19 19   CREATININE 0.93 0.99 0.89     CMP -  Lab Results   Component Value Date    CALCIUM 9.5 03/08/2024    PROT 6.8 03/08/2024    ALBUMIN 4.9 03/08/2024    AST 17 03/08/2024    ALT 21 03/08/2024    ALKPHOS 67 03/08/2024    BILITOT 0.6 03/08/2024       LIPID PANEL -   Lab Results   Component Value Date    CHOL 209 (H) 03/08/2024    HDL 36.6 03/08/2024    LDLF 92 03/24/2023    TRIG 359 (H) 03/08/2024       Lab Results   Component Value Date    BNP 9 01/30/2019    HGBA1C 7.5 " (H) 03/08/2024       Last Cardiology Tests:  ECG:  Reviewed EKG from 3/7/23- normal sinus rhythm HR 68    Echo:    Echocardiogram February 15, 2024.  Normal LV function with an ejection fraction of 65 to 70%.  Moderate concentric left ventricular hypertrophy septum 1.3 cm and a posterior wall measurement of 1.5 cm  Sclerotic aortic valve without significant stenosis  Mild mitral regurgitation  Mild to moderate tricuspid regurgitation       Echo Results:  1/31/2019   CONCLUSIONS:   1. The left ventricular systolic function is normal with a 60-65% estimated ejection fraction.   2. Moderately increased left ventricular septal thickness.   3. Spectral Doppler shows an impaired relaxation pattern of left ventricular diastolic filling.   4. RVSP within normal limits.   5. Aortic valve stenosis is not present.   6. The pulmonary artery is not well visualized.      Cath:  Coronary Lesion Summary:  Vessel            Stenosis      Vessel Segment  LAD           40-50% stenosis         mid  1st Diagonal    40% stenosis    proximal to mid  1st Diagonal    80% stenosis          mid  OM 1            70% stenosis       proximal  Ramus        10 to 30% stenosis    proximal  RCA             30% stenosis        distal     CONCLUSIONS:   1. Severe branch vessel CAD (D1 and OM1) in a right dominant system.   2.  of small inferior branch of OM1.   3. Elevated LVEDP.   4. No evidence of aortic stenosis.     Stress Test:   Lexiscan nuclear stress test February 14, 2024  Small area of ischemia in the apical lateral wall with basal lateral scar.  Normal LV function with an ejection fraction of 60%.  No angina or arrhythmias or ischemic EKG changes with pharmacological stress.  Mild resting hypertension.         Cardiac Imaging:  CT lung screening 5/18/2023  HEART AND VESSELS:   The thoracic aorta normal in course and caliber.There is mild   scattered calcified atherosclerosis present.   Main pulmonary artery and its branches are normal in  caliber.   Severe coronary artery calcifications are seen. Please note,the study   is not optimized for evaluation of coronary arteries.   The cardiac chambers are not enlarged.   There is no pericardial effusion seen.   Impression   1. 2 mm right lower lobe nodule, likely benign. Continued screening  with low-dose noncontrast chest CT in 12 months (from current date)  is recommended.  2. Mild upper lung predominant emphysema.  3. Severe coronary artery calcification. Correlation with coronary  artery disease risk factors.  4. Diffuse hepatic steatosis. PCP evaluation is recommended.       Assessment/Plan   Problem List Items Addressed This Visit          Cardiac and Vasculature    Benign essential hypertension    Relevant Medications    carvedilol (Coreg) 12.5 mg tablet    Hyperlipidemia - Primary     Other Visit Diagnoses       Coronary artery disease involving native coronary artery of native heart without angina pectoris        Relevant Medications    carvedilol (Coreg) 12.5 mg tablet          In summary Mr. Blackburn is a pleasant 63-year-old white male with a past medical history significant for severe coronary atherosclerosis on CT with severe branch vessel coronary disease by coronary angiography, type II non-insulin-requiring diabetes, hypertension, dyslipidemia, obstructive sleep apnea, obesity, COPD, and chronic tobacco use. He is seemingly asymptomatic from a cardiac perspective but is significantly limited physically by chronic left knee pain. He appears euvolemic on exam. He has severe coronary artery calcifications on CT. His SPECT images are suggestive of apical lateral ischemia with questionable lateral infarct. He underwent recent left heart catheterization that showed severe branch vessel coronary disease. His blood pressure is elevated today. I have increased his Carvedilol to 12.5 mg twice a day. I have recommended he monitor blood pressure at home. He will follow up with primary care physician as  scheduled in May. I have encouraged him to start Repatha. He is going to start after he goes to PAT testing next week as he was concerned the Repatha will increase his blood sugar and prevent him from having surgery.  I strongly encouraged him to stop smoking and spent more than 3 minutes of time counseling him to do so. He is trying to quit. He is at acceptable risk for knee surgery. He should remain on Aspirin without interruption perioperatively.  No further cardiac testing required. He should continue his other cardiovascular medications. He will follow up in 6 months.     Orders:  No orders of the defined types were placed in this encounter.     Followup Appts:  Future Appointments   Date Time Provider Department Center   4/11/2024 12:15 PM FERNANDO Matute [facility]   5/15/2024 10:00 AM Jung Plunkett MD [id number] [facility]   6/5/2024 10:30 AM Jung Plunkett MD [id number] [facility]   6/28/2024  9:20 AM Stewart Harmon MD [id number] [facility]           ____________________________________________________________  ENRICO Cali-CNP  [facility]  [facility]

## 2024-04-13 DIAGNOSIS — E11.9 TYPE 2 DIABETES MELLITUS WITHOUT COMPLICATION, WITHOUT LONG-TERM CURRENT USE OF INSULIN (MULTI): ICD-10-CM

## 2024-04-13 LAB — STAPHYLOCOCCUS SPEC CULT: NORMAL

## 2024-04-15 DIAGNOSIS — M17.12 PRIMARY OSTEOARTHRITIS OF LEFT KNEE: ICD-10-CM

## 2024-04-15 RX ORDER — METFORMIN HYDROCHLORIDE 500 MG/1
TABLET ORAL
Qty: 360 TABLET | Refills: 1 | Status: SHIPPED | OUTPATIENT
Start: 2024-04-15

## 2024-04-16 RX ORDER — FLUTICASONE PROPIONATE 50 MCG
1 SPRAY, SUSPENSION (ML) NASAL 2 TIMES DAILY PRN
Qty: 16 G | Refills: 1 | Status: SHIPPED | OUTPATIENT
Start: 2024-04-16

## 2024-04-18 ENCOUNTER — OFFICE VISIT (OUTPATIENT)
Dept: PRIMARY CARE | Facility: CLINIC | Age: 64
End: 2024-04-18
Payer: COMMERCIAL

## 2024-04-18 VITALS
SYSTOLIC BLOOD PRESSURE: 132 MMHG | RESPIRATION RATE: 16 BRPM | BODY MASS INDEX: 36.06 KG/M2 | HEART RATE: 72 BPM | HEIGHT: 74 IN | DIASTOLIC BLOOD PRESSURE: 82 MMHG | WEIGHT: 281 LBS

## 2024-04-18 DIAGNOSIS — J98.01 COUGH DUE TO BRONCHOSPASM: Primary | ICD-10-CM

## 2024-04-18 DIAGNOSIS — K75.81 NONALCOHOLIC STEATOHEPATITIS (NASH): ICD-10-CM

## 2024-04-18 DIAGNOSIS — E11.9 TYPE 2 DIABETES MELLITUS WITHOUT COMPLICATION, WITHOUT LONG-TERM CURRENT USE OF INSULIN (MULTI): ICD-10-CM

## 2024-04-18 PROCEDURE — 4004F PT TOBACCO SCREEN RCVD TLK: CPT | Performed by: INTERNAL MEDICINE

## 2024-04-18 PROCEDURE — 3079F DIAST BP 80-89 MM HG: CPT | Performed by: INTERNAL MEDICINE

## 2024-04-18 PROCEDURE — 3051F HG A1C>EQUAL 7.0%<8.0%: CPT | Performed by: INTERNAL MEDICINE

## 2024-04-18 PROCEDURE — 99214 OFFICE O/P EST MOD 30 MIN: CPT | Performed by: INTERNAL MEDICINE

## 2024-04-18 PROCEDURE — 3049F LDL-C 100-129 MG/DL: CPT | Performed by: INTERNAL MEDICINE

## 2024-04-18 PROCEDURE — 3075F SYST BP GE 130 - 139MM HG: CPT | Performed by: INTERNAL MEDICINE

## 2024-04-18 RX ORDER — AZITHROMYCIN 500 MG/1
500 TABLET, FILM COATED ORAL DAILY
Qty: 6 TABLET | Refills: 1 | Status: SHIPPED | OUTPATIENT
Start: 2024-04-18 | End: 2024-05-28 | Stop reason: WASHOUT

## 2024-04-18 ASSESSMENT — ENCOUNTER SYMPTOMS
MUSCULOSKELETAL NEGATIVE: 1
ALLERGIC/IMMUNOLOGIC NEGATIVE: 1
ENDOCRINE NEGATIVE: 1
COUGH: 1
CARDIOVASCULAR NEGATIVE: 1
EYES NEGATIVE: 1
GASTROINTESTINAL NEGATIVE: 1
CONSTITUTIONAL NEGATIVE: 1
PSYCHIATRIC NEGATIVE: 1
NEUROLOGICAL NEGATIVE: 1
HEMATOLOGIC/LYMPHATIC NEGATIVE: 1

## 2024-04-18 NOTE — PROGRESS NOTES
"Subjective   Patient ID: Hammad Blackburn is a 63 y.o. male who presents for Cough (Cough x10+ days ).    Cough         Review of Systems   Constitutional: Negative.    HENT: Negative.     Eyes: Negative.    Respiratory:  Positive for cough.    Cardiovascular: Negative.    Gastrointestinal: Negative.    Endocrine: Negative.    Musculoskeletal: Negative.    Skin: Negative.    Allergic/Immunologic: Negative.    Neurological: Negative.    Hematological: Negative.    Psychiatric/Behavioral: Negative.     All other systems reviewed and are negative.      Objective   Ht 1.88 m (6' 2\")   Wt 127 kg (281 lb)   BMI 36.08 kg/m²   Blood pressure 132/82, pulse 72, resp. rate 16, height 1.88 m (6' 2\"), weight 127 kg (281 lb).   Physical Exam  Vitals and nursing note reviewed.   Constitutional:       Appearance: Normal appearance.   HENT:      Head: Normocephalic and atraumatic.      Right Ear: Tympanic membrane, ear canal and external ear normal.      Left Ear: Tympanic membrane, ear canal and external ear normal. There is no impacted cerumen.      Nose: Nose normal.      Mouth/Throat:      Mouth: Mucous membranes are moist.      Pharynx: Oropharynx is clear.   Eyes:      Extraocular Movements: Extraocular movements intact.      Conjunctiva/sclera: Conjunctivae normal.      Pupils: Pupils are equal, round, and reactive to light.   Cardiovascular:      Rate and Rhythm: Normal rate and regular rhythm.      Pulses: Normal pulses.      Heart sounds: Normal heart sounds. No murmur heard.  Pulmonary:      Effort: Pulmonary effort is normal. No respiratory distress.      Breath sounds: Normal breath sounds. No stridor. No wheezing, rhonchi or rales.   Chest:      Chest wall: No tenderness.   Abdominal:      General: Abdomen is flat. Bowel sounds are normal. There is no distension.      Palpations: Abdomen is soft. There is no mass.      Tenderness: There is no abdominal tenderness. There is no right CVA tenderness, left CVA tenderness, " guarding or rebound.      Hernia: No hernia is present.   Musculoskeletal:         General: Normal range of motion.      Cervical back: Normal range of motion and neck supple.   Skin:     General: Skin is warm.      Capillary Refill: Capillary refill takes less than 2 seconds.   Neurological:      General: No focal deficit present.      Mental Status: He is alert.      Cranial Nerves: No cranial nerve deficit.      Sensory: No sensory deficit.      Motor: No weakness.      Coordination: Coordination normal.      Gait: Gait normal.      Deep Tendon Reflexes: Reflexes normal.   Psychiatric:         Mood and Affect: Mood normal.         Behavior: Behavior normal. Behavior is cooperative.         Thought Content: Thought content normal.         Judgment: Judgment normal.         Assessment/Plan   Problem List Items Addressed This Visit             ICD-10-CM    Type 2 diabetes mellitus without complications (Multi) E11.9     DM - NIDDM  . Reviewed with patient / Will check  HbA1c and fasting blood sugars. Educate home self monitoring and diary keeping(reviewed with patient home blood sugar levels /diary). Educate extensively low calorie diet and weight loss with exercise. Reviewed BS- diary and Rx. Regimen. [place] renal protection with ARB/ACEI.               Educate compliance with diet and Rx. And educate risks and autcomes.       Refill the Metformin 500 mg BID with meals                                            Nonalcoholic steatohepatitis (CELIS) K75.81     Educate diet and follow liver function tests         Cough due to bronchospasm - Primary J98.01     Bronchitis with wheezing                                           Recommend:                                                                                                      mild  Pharyngitis,                                                                                                                                                                                Start:   Azithromycin 500 mg daily for 6 days  -                                                                                                                                                             Asthma/COPD exacerbation       Allegra 180mg qD vs. Claritin 10 mg qD and Mucinex                                                                                                                                             Cough  - startstart Azithromycin 500 mg daily for 6 days and Allegra 180 mg daily and Flonase I puff BID and avoids cold exposure   Sinusitis - allergic vs. infectious - start Allegra and Flonase I BID and Azithromycin 500 mg qD for 6 days. and avoid cold exposure.           Relevant Medications    azithromycin (Zithromax) 500 mg tablet    Other Relevant Orders    XR chest 2 views       Benign essential hypertension I10         HTN - hypertension well/controlled .Target BP < 130/80  achieved. Educate low salt diet and exercise with weight loss. Educate home self monitoring and diary keeping. Educate risks of elevate blood pressure and benefits of prompt treatment.  Refill Amlodipine            COPD with acute bronchitis (CMS/[facility]) J44.0, J20.9       COPD - Educate tobacco cessation extensively , no wheezing, no SOB, no Crackles heard/ Exacerbation.         Get CXR.  Continues mild exercises and inhalers.  [place] preventive care and vaccinations.                                       Add advair inhalers and spiriva inhaler.             Esophageal reflux K21.9       GERD - Acid reflux disease. Rx. PPI (Prilosec/Prevacid/Protonix/Nexium) and educate diet and life style changes. Referred patient to an endoscopy (EGD) and check H. Pylori titers.            Hyperlipidemia E78.5       Hypercholesterolemia - Monitor lipid profile and educate patient upon risks of high cholesterol and targets. Educate diet and change in lifestyle and increase in exercises - Refill: Fenofibrate and   Rosuvastatin and Vascepa  and educate compliance with medication and diet.             Malaise and fatigue R53.81, R53.83       Fatigue - check CMP(metabolic panel and elctrolytes) , CBC(complete blood cell count), TSH(thyroid function). Insomnia may play a role and sleep studies(rule out sleep apnea) are recommended . Educate sleep hygiene. Consider anxiety disorder vs. depression. Consider Stress test, and 2DECHO.             Lumbosacral spondylosis M47.817       DDD - Degenerative disc disease of the Lumbo-Sacral (LS)/spine. Educate exercises and referred patient to Physical Therapy (PT). Ordered X-Ray's of the LS/ spine. Consider MRI. Radiculopathy in the distribution of L4-L5-S1/ nerve roots, needs NSAIDS (Naprosin 500mg BID vs. Arthrotec 75mg BID or Prednisone taper (Medrol dose pack), Flexeril 10 mg qHS, heat application, and pain control with Tylenol 325 mg TID.              Lumbar radiculopathy, chronic M54.16       DDD - Degenerative disc disease of the Lumbo-Sacral (LS)/spine. Educate exercises and referred patient to Physical Therapy (PT). Ordered X-Ray's of the LS/ spine. Consider MRI. Radiculopathy in the distribution of L4-L5-S1/ nerve roots, needs NSAIDS (Naprosin 500mg BID vs. Arthrotec 75mg BID or Prednisone taper (Medrol dose pack), Flexeril 10 mg qHS, heat application, and pain control with Tylenol 325 mg TID.             Nicotine dependence, cigarettes, uncomplicated F17.210       Tobacco cessation - Educate risks of smoking (CAD/COPD/CANCER of the lung or urinary bladder/CVA). Educate life style changes and prescribe nicotine patch and Wellbutrin 150mg BID. Educate stress reduction.                                                                                        Type 2 diabetes mellitus without complications (CMS/HCC) E11.9       DM - NIDDM  . Reviewed with patient / Will check  HbA1c and fasting blood sugars. Educate home self monitoring and diary keeping(reviewed with patient home blood  sugar levels /diary). Educate extensively low calorie diet and weight loss with exercise. Reviewed BS- diary and Rx. Regimen. [place] renal protection with ARB/ACEI.               Educate compliance with diet and Rx. And educate risks and autcomes.                                                   Generalized pain R52      Other Visit Diagnoses           Codes     Lumbar and sacral spondylarthritis    -  Primary M47.817     Relevant Medications     predniSONE (Deltasone) 10 mg tablet     Other Relevant Orders     Referral to Spine Surgery     Referral to Pain Medicine                             Immunizations/Injections       very important  Immunizations from outside sources need reconciliation.       Influenza, High Dose Seasonal, Preservative Free10/19/2021  Influenza, Mrhabltprpk50/6/2017  Influenza, seasonal, mvornqzjfu45/3/2018  Pfizer COVID-19 vaccine, Fall 2023, 12 years and older, (30mcg/0.3mL)10/9/2023  Pfizer Purple Cap SARS-CoV-210/13/2021, 4/8/2021, 3/16/2021                   Immunizations/Injections       very important  Immunizations from outside sources need reconciliation.       Influenza, High Dose Seasonal, Preservative Free10/19/2021  Influenza, Fbqbesahvws36/6/2017  Influenza, seasonal, tlfqhyaugk56/3/2018  Pfizer COVID-19 vaccine, Fall 2023, 12 years and older, (30mcg/0.3mL)10/9/2023  Pfizer Purple Cap SARS-CoV-210/13/2021, 4/8/2021, 3/16/2021  RSV-MAb10/23/2023                 Immunizations/Injections      very important  Immunizations from outside sources need reconciliation.      Influenza, High Dose Seasonal, Preservative Free10/19/2021  Influenza, Khowkbapoth92/6/2017  Influenza, seasonal, ftxltmudts78/3/2018  Pfizer COVID-19 vaccine, Fall 2023, 12 years and older, (30mcg/0.3mL)10/9/2023  Pfizer Purple Cap SARS-CoV-210/13/2021, 4/8/2021, 3/16/2021  RSV-MAb10/23/2023

## 2024-04-18 NOTE — ASSESSMENT & PLAN NOTE
Bronchitis with wheezing                                           Recommend:                                                                                                      mild  Pharyngitis,                                                                                                                                                                               Start:   Azithromycin 500 mg daily for 6 days  -                                                                                                                                                             Asthma/COPD exacerbation       Allegra 180mg qD vs. Claritin 10 mg qD and Mucinex                                                                                                                                             Cough  - startstart Azithromycin 500 mg daily for 6 days and Allegra 180 mg daily and Flonase I puff BID and avoids cold exposure   Sinusitis - allergic vs. infectious - start Allegra and Flonase I BID and Azithromycin 500 mg qD for 6 days. and avoid cold exposure.

## 2024-04-18 NOTE — ASSESSMENT & PLAN NOTE
DM - NIDDM  . Reviewed with patient / Will check  HbA1c and fasting blood sugars. Educate home self monitoring and diary keeping(reviewed with patient home blood sugar levels /diary). Educate extensively low calorie diet and weight loss with exercise. Reviewed BS- diary and Rx. Regimen. [place] renal protection with ARB/ACEI.               Educate compliance with diet and Rx. And educate risks and autcomes.       Refill the Metformin 500 mg BID with meals

## 2024-04-19 ENCOUNTER — HOSPITAL ENCOUNTER (OUTPATIENT)
Dept: RADIOLOGY | Facility: CLINIC | Age: 64
Discharge: HOME | End: 2024-04-19
Payer: COMMERCIAL

## 2024-04-19 ENCOUNTER — LAB (OUTPATIENT)
Dept: LAB | Facility: LAB | Age: 64
End: 2024-04-19
Payer: COMMERCIAL

## 2024-04-19 DIAGNOSIS — J40 BRONCHITIS: ICD-10-CM

## 2024-04-19 DIAGNOSIS — J98.01 COUGH DUE TO BRONCHOSPASM: ICD-10-CM

## 2024-04-19 DIAGNOSIS — M17.12 PRIMARY OSTEOARTHRITIS OF LEFT KNEE: ICD-10-CM

## 2024-04-19 LAB
EST. AVERAGE GLUCOSE BLD GHB EST-MCNC: 163 MG/DL
HBA1C MFR BLD: 7.3 %

## 2024-04-19 PROCEDURE — 36415 COLL VENOUS BLD VENIPUNCTURE: CPT

## 2024-04-19 PROCEDURE — 71046 X-RAY EXAM CHEST 2 VIEWS: CPT

## 2024-04-19 PROCEDURE — 71046 X-RAY EXAM CHEST 2 VIEWS: CPT | Performed by: RADIOLOGY

## 2024-04-19 PROCEDURE — 83036 HEMOGLOBIN GLYCOSYLATED A1C: CPT

## 2024-04-19 RX ORDER — PREDNISONE 10 MG/1
TABLET ORAL DAILY
Qty: 30 TABLET | Refills: 0 | Status: SHIPPED | OUTPATIENT
Start: 2024-04-19 | End: 2024-04-29

## 2024-04-19 NOTE — TELEPHONE ENCOUNTER
START Prednisone 10 mg II BID for 3 days and II qD for 3 days and I qD for 3 days -   he needs to postpone the surgery

## 2024-04-23 ENCOUNTER — TELEPHONE (OUTPATIENT)
Dept: PRIMARY CARE | Facility: CLINIC | Age: 64
End: 2024-04-23
Payer: COMMERCIAL

## 2024-04-25 ENCOUNTER — TELEPHONE (OUTPATIENT)
Dept: PREADMISSION TESTING | Facility: HOSPITAL | Age: 64
End: 2024-04-25
Payer: COMMERCIAL

## 2024-04-25 ENCOUNTER — TELEPHONE (OUTPATIENT)
Dept: PRIMARY CARE | Facility: CLINIC | Age: 64
End: 2024-04-25
Payer: COMMERCIAL

## 2024-04-25 DIAGNOSIS — M17.12 PRIMARY OSTEOARTHRITIS OF LEFT KNEE: ICD-10-CM

## 2024-04-25 DIAGNOSIS — E11.9 TYPE 2 DIABETES MELLITUS WITHOUT COMPLICATION, WITHOUT LONG-TERM CURRENT USE OF INSULIN (MULTI): ICD-10-CM

## 2024-05-08 ENCOUNTER — APPOINTMENT (OUTPATIENT)
Dept: ORTHOPEDIC SURGERY | Facility: CLINIC | Age: 64
End: 2024-05-08
Payer: COMMERCIAL

## 2024-05-09 DIAGNOSIS — R53.83 FATIGUE, UNSPECIFIED TYPE: ICD-10-CM

## 2024-05-09 RX ORDER — FOLIC ACID 1 MG/1
1 TABLET ORAL DAILY
Qty: 90 TABLET | Refills: 0 | Status: SHIPPED | OUTPATIENT
Start: 2024-05-09

## 2024-05-10 DIAGNOSIS — E78.5 HYPERLIPIDEMIA, UNSPECIFIED HYPERLIPIDEMIA TYPE: ICD-10-CM

## 2024-05-10 RX ORDER — ROSUVASTATIN CALCIUM 40 MG/1
40 TABLET, COATED ORAL DAILY
Qty: 90 TABLET | Refills: 0 | Status: SHIPPED | OUTPATIENT
Start: 2024-05-10

## 2024-05-15 ENCOUNTER — APPOINTMENT (OUTPATIENT)
Dept: ORTHOPEDIC SURGERY | Facility: CLINIC | Age: 64
End: 2024-05-15
Payer: COMMERCIAL

## 2024-05-15 PROCEDURE — RXMED WILLOW AMBULATORY MEDICATION CHARGE

## 2024-05-16 ENCOUNTER — PHARMACY VISIT (OUTPATIENT)
Dept: PHARMACY | Facility: CLINIC | Age: 64
End: 2024-05-16
Payer: MEDICARE

## 2024-05-22 DIAGNOSIS — E11.9 TYPE 2 DIABETES MELLITUS WITHOUT COMPLICATION, WITHOUT LONG-TERM CURRENT USE OF INSULIN (MULTI): ICD-10-CM

## 2024-05-22 RX ORDER — BLOOD SUGAR DIAGNOSTIC
1 STRIP MISCELLANEOUS 2 TIMES DAILY
Qty: 200 STRIP | Refills: 1 | Status: SHIPPED | OUTPATIENT
Start: 2024-05-22

## 2024-05-28 ENCOUNTER — CLINICAL SUPPORT (OUTPATIENT)
Dept: PREADMISSION TESTING | Facility: HOSPITAL | Age: 64
End: 2024-05-28
Payer: COMMERCIAL

## 2024-05-28 DIAGNOSIS — M17.12 PRIMARY OSTEOARTHRITIS OF LEFT KNEE: ICD-10-CM

## 2024-05-28 RX ORDER — IBUPROFEN 400 MG/1
400 TABLET ORAL EVERY 6 HOURS PRN
COMMUNITY

## 2024-05-28 RX ORDER — DEXTROMETHORPHAN HYDROBROMIDE, GUAIFENESIN 5; 100 MG/5ML; MG/5ML
650 LIQUID ORAL EVERY 8 HOURS PRN
COMMUNITY

## 2024-06-02 DIAGNOSIS — E11.9 TYPE 2 DIABETES MELLITUS WITHOUT COMPLICATION, WITHOUT LONG-TERM CURRENT USE OF INSULIN (MULTI): ICD-10-CM

## 2024-06-03 DIAGNOSIS — E11.9 TYPE 2 DIABETES MELLITUS WITHOUT COMPLICATION, WITHOUT LONG-TERM CURRENT USE OF INSULIN (MULTI): Primary | ICD-10-CM

## 2024-06-03 DIAGNOSIS — Z00.00 ENCOUNTER FOR GENERAL ADULT MEDICAL EXAMINATION WITHOUT ABNORMAL FINDINGS: ICD-10-CM

## 2024-06-03 RX ORDER — SEMAGLUTIDE 0.68 MG/ML
0.5 INJECTION, SOLUTION SUBCUTANEOUS
Qty: 3 ML | Refills: 0 | Status: SHIPPED | OUTPATIENT
Start: 2024-06-09

## 2024-06-03 RX ORDER — ALLOPURINOL 300 MG/1
300 TABLET ORAL DAILY
Qty: 90 TABLET | Refills: 0 | Status: SHIPPED | OUTPATIENT
Start: 2024-06-03

## 2024-06-05 ENCOUNTER — APPOINTMENT (OUTPATIENT)
Dept: ORTHOPEDIC SURGERY | Facility: CLINIC | Age: 64
End: 2024-06-05
Payer: COMMERCIAL

## 2024-06-10 ENCOUNTER — DOCUMENTATION (OUTPATIENT)
Dept: PREADMISSION TESTING | Facility: HOSPITAL | Age: 64
End: 2024-06-10

## 2024-06-10 ENCOUNTER — LAB (OUTPATIENT)
Dept: LAB | Facility: LAB | Age: 64
End: 2024-06-10
Payer: COMMERCIAL

## 2024-06-10 ENCOUNTER — PRE-ADMISSION TESTING (OUTPATIENT)
Dept: PREADMISSION TESTING | Facility: HOSPITAL | Age: 64
End: 2024-06-10
Payer: COMMERCIAL

## 2024-06-10 VITALS
BODY MASS INDEX: 35.42 KG/M2 | WEIGHT: 276.02 LBS | HEIGHT: 74 IN | SYSTOLIC BLOOD PRESSURE: 128 MMHG | HEART RATE: 61 BPM | DIASTOLIC BLOOD PRESSURE: 71 MMHG | RESPIRATION RATE: 16 BRPM | OXYGEN SATURATION: 98 % | TEMPERATURE: 97.3 F

## 2024-06-10 DIAGNOSIS — E11.9 TYPE 2 DIABETES MELLITUS WITHOUT COMPLICATION, WITHOUT LONG-TERM CURRENT USE OF INSULIN (MULTI): ICD-10-CM

## 2024-06-10 DIAGNOSIS — Z01.818 PREOP TESTING: ICD-10-CM

## 2024-06-10 DIAGNOSIS — Z01.818 PREOP TESTING: Primary | ICD-10-CM

## 2024-06-10 LAB
ALBUMIN SERPL BCP-MCNC: 4.5 G/DL (ref 3.4–5)
ALP SERPL-CCNC: 61 U/L (ref 33–136)
ALT SERPL W P-5'-P-CCNC: 15 U/L (ref 10–52)
ANION GAP SERPL CALC-SCNC: 15 MMOL/L (ref 10–20)
AST SERPL W P-5'-P-CCNC: 14 U/L (ref 9–39)
BASOPHILS # BLD AUTO: 0.02 X10*3/UL (ref 0–0.1)
BASOPHILS NFR BLD AUTO: 0.3 %
BILIRUB SERPL-MCNC: 0.6 MG/DL (ref 0–1.2)
BUN SERPL-MCNC: 23 MG/DL (ref 6–23)
CALCIUM SERPL-MCNC: 9.4 MG/DL (ref 8.6–10.3)
CHLORIDE SERPL-SCNC: 103 MMOL/L (ref 98–107)
CHOLEST SERPL-MCNC: 101 MG/DL (ref 0–199)
CHOLESTEROL/HDL RATIO: 2.7
CO2 SERPL-SCNC: 26 MMOL/L (ref 21–32)
CREAT SERPL-MCNC: 0.92 MG/DL (ref 0.5–1.3)
EGFRCR SERPLBLD CKD-EPI 2021: >90 ML/MIN/1.73M*2
EOSINOPHIL # BLD AUTO: 0.13 X10*3/UL (ref 0–0.7)
EOSINOPHIL NFR BLD AUTO: 1.9 %
ERYTHROCYTE [DISTWIDTH] IN BLOOD BY AUTOMATED COUNT: 13.4 % (ref 11.5–14.5)
EST. AVERAGE GLUCOSE BLD GHB EST-MCNC: 160 MG/DL
GLUCOSE SERPL-MCNC: 127 MG/DL (ref 74–99)
HBA1C MFR BLD: 7.2 %
HCT VFR BLD AUTO: 41.9 % (ref 41–52)
HDLC SERPL-MCNC: 37.9 MG/DL
HGB BLD-MCNC: 14 G/DL (ref 13.5–17.5)
IMM GRANULOCYTES # BLD AUTO: 0.02 X10*3/UL (ref 0–0.7)
IMM GRANULOCYTES NFR BLD AUTO: 0.3 % (ref 0–0.9)
LDLC SERPL CALC-MCNC: 6 MG/DL
LYMPHOCYTES # BLD AUTO: 1.5 X10*3/UL (ref 1.2–4.8)
LYMPHOCYTES NFR BLD AUTO: 22 %
MCH RBC QN AUTO: 30 PG (ref 26–34)
MCHC RBC AUTO-ENTMCNC: 33.4 G/DL (ref 32–36)
MCV RBC AUTO: 90 FL (ref 80–100)
MONOCYTES # BLD AUTO: 0.33 X10*3/UL (ref 0.1–1)
MONOCYTES NFR BLD AUTO: 4.8 %
NEUTROPHILS # BLD AUTO: 4.83 X10*3/UL (ref 1.2–7.7)
NEUTROPHILS NFR BLD AUTO: 70.7 %
NON HDL CHOLESTEROL: 63 MG/DL (ref 0–149)
NRBC BLD-RTO: 0 /100 WBCS (ref 0–0)
PLATELET # BLD AUTO: 188 X10*3/UL (ref 150–450)
POTASSIUM SERPL-SCNC: 4.5 MMOL/L (ref 3.5–5.3)
PROT SERPL-MCNC: 6.5 G/DL (ref 6.4–8.2)
RBC # BLD AUTO: 4.66 X10*6/UL (ref 4.5–5.9)
SODIUM SERPL-SCNC: 139 MMOL/L (ref 136–145)
TRIGL SERPL-MCNC: 287 MG/DL (ref 0–149)
VLDL: 57 MG/DL (ref 0–40)
WBC # BLD AUTO: 6.8 X10*3/UL (ref 4.4–11.3)

## 2024-06-10 PROCEDURE — 80061 LIPID PANEL: CPT

## 2024-06-10 PROCEDURE — 36415 COLL VENOUS BLD VENIPUNCTURE: CPT

## 2024-06-10 PROCEDURE — 80053 COMPREHEN METABOLIC PANEL: CPT

## 2024-06-10 PROCEDURE — 83036 HEMOGLOBIN GLYCOSYLATED A1C: CPT

## 2024-06-10 PROCEDURE — 85025 COMPLETE CBC W/AUTO DIFF WBC: CPT

## 2024-06-10 RX ORDER — CHLORHEXIDINE GLUCONATE ORAL RINSE 1.2 MG/ML
SOLUTION DENTAL
Qty: 475 ML | Refills: 0 | Status: SHIPPED | OUTPATIENT
Start: 2024-06-10 | End: 2024-06-18 | Stop reason: HOSPADM

## 2024-06-10 ASSESSMENT — ENCOUNTER SYMPTOMS
VISUAL CHANGE: 0
CONSTIPATION: 0
BLOOD IN STOOL: 0
UNEXPECTED WEIGHT CHANGE: 0
BRUISES/BLEEDS EASILY: 0
CONFUSION: 0
NAUSEA: 0
LIGHT-HEADEDNESS: 0
TROUBLE SWALLOWING: 0
MYALGIAS: 0
WOUND: 0
RHINORRHEA: 0
DOUBLE VISION: 0
EYE DISCHARGE: 0
CHILLS: 0
DYSPNEA WITH EXERTION: 0
DIFFICULTY URINATING: 0
FEVER: 0
DIARRHEA: 0
WEAKNESS: 0
VOMITING: 0
EYE PAIN: 0
EXCESSIVE BLEEDING: 0
ABDOMINAL DISTENTION: 0
ABDOMINAL PAIN: 0
SHORTNESS OF BREATH: 0
LIMITED RANGE OF MOTION: 0
SKIN CHANGES: 0
COUGH: 0
WHEEZING: 0
DYSPNEA AT REST: 0
NECK STIFFNESS: 0
ARTHRALGIAS: 1
SINUS CONGESTION: 0
NUMBNESS: 0
HEMOPTYSIS: 0
NECK PAIN: 0
DYSURIA: 0
PALPITATIONS: 0

## 2024-06-10 NOTE — CPM/PAT H&P
[facility]/PAT Evaluation       Name: Hammad Blackburn (Hammad Blackburn)  /Age: 1960/63 y.o.       Date of Consult: 06/10/24     Referring Provider: Dr. Ponce Robles    Surgery, Date, and Length: Left Knee Total Arthroplasty - Left , 24, 180MIN    Hammad Blackburn is a 63 year-old male who presents to the [facility] for perioperative risk assessment prior to surgery.    Patient presents with a primary diagnosis of left knee osteoarthritis.  left knee osteoarthritis. Pt refers to left knee pain x 2-3 years.  He has tried steroid joint injections which provided about 1-2 months of pain relief.  He has tried ibuprofen which provides some mild pain relief. Pt refers walking up stairs or up incline and getting up from seated position makes pain worse.  Rest and elevation makes pain better.  Pt denies any falls.  Pt wishes to proceed with surgery as planned. Of note, he was scheduled on two or three other occasions for this same TJR and was rescheduled due to elevated A1c level.     This note was created in part upon personal review of patient's medical records.      Patient is scheduled to have Left Knee Total Arthroplasty - Left      Pt denies any past history of anesthetic complications such as PONV, awareness, prolonged sedation, dental damage, aspiration, cardiac arrest, difficult intubation, difficult I.V. access or unexpected hospital admissions.  NO malignant hyperthermia and or pseudocholinesterase deficiency.  No history of blood transfusions     The patient is not a [employer] and will accept blood and blood products if medically indicated.   Type and screen sent.     Past Medical History:   Diagnosis Date    Abnormal stress test     followed by heart cath    Anxiety disorder, unspecified     Arthritis     Cardiology follow-up encounter     Scheduled with JACOBO Lay NP for 2024    COPD (chronic obstructive pulmonary disease) (Multi)     Coronary atherosclerosis     Diabetes mellitus (Multi)     Elevated  prostate specific antigen (PSA)     GERD (gastroesophageal reflux disease)     H/O percutaneous left heart catheterization 03/11/2024    1. Severe branch vessel CAD (D1 and OM1) in a right dominant system.  2.  of small inferior branch of OM1.  3. Elevated LVEDP.  4. No evidence of aortic stenosis.    History of depression     Hyperlipidemia     Hypertension     Nephrolithiasis     Sleep apnea     Tobacco use     Unilateral primary osteoarthritis, left knee        Past Surgical History:   Procedure Laterality Date    ADENOIDECTOMY      APPENDECTOMY      CARDIAC CATHETERIZATION N/A 03/11/2024    Procedure: Left Heart Cath with Coronary Angiography and LV;  Surgeon: Alan Davis MD;  Location: [facility] Cardiac Cath Lab;  Service: Cardiovascular;  Laterality: N/A;  Scheduled 3/11/24 @ 10:30 am    COLONOSCOPY      HERNIA REPAIR      KNEE SURGERY      Knee Arthroscopy With Medial And Lateral Meniscus Repair    TONSILLECTOMY         Patient  has no history on file for sexual activity.    Family History   Problem Relation Name Age of Onset    COPD Mother      Hypertension Father      Diabetes Father      Coronary artery disease Father  40 - 49        s/p CABG     Social History     Socioeconomic History    Marital status:      Spouse name: Not on file    Number of children: Not on file    Years of education: Not on file    Highest education level: Not on file   Occupational History    Not on file   Tobacco Use    Smoking status: Every Day     Current packs/day: 0.50     Average packs/day: 0.5 packs/day for 43.4 years (21.7 ttl pk-yrs)     Types: Cigarettes     Start date: 1981    Smokeless tobacco: Never    Tobacco comments:     continues to smoke 3 to 5 cigarettes a day   Vaping Use    Vaping status: Never Used   Substance and Sexual Activity    Alcohol use: Yes     Comment: 4-6 alcoholic beverages a week    Drug use: Never    Sexual activity: Not on file   Other Topics Concern    Not on file   Social History  Narrative    Not on file     Social Determinants of Health     Financial Resource Strain: Not on file   Food Insecurity: Not on file   Transportation Needs: Not on file   Physical Activity: Not on file   Stress: Not on file   Social Connections: Not on file   Intimate Partner Violence: Not on file   Housing Stability: Not on file        No Known Allergies    Current Outpatient Medications:     acetaminophen (Tylenol 8 HOUR) 650 mg ER tablet, Take 1 tablet (650 mg) by mouth every 8 hours if needed for mild pain (1 - 3). Do not crush, chew, or split., Disp: , Rfl:     allopurinol (Zyloprim) 300 mg tablet, TAKE 1 TABLET BY MOUTH EVERY DAY, Disp: 90 tablet, Rfl: 0    amLODIPine (Norvasc) 10 mg tablet, TAKE 1 TABLET BY MOUTH EVERY DAY, Disp: 90 tablet, Rfl: 0    aspirin 81 mg chewable tablet, Chew 1 tablet (81 mg) once daily., Disp: 90 tablet, Rfl: 3    aspirin-acetaminophen-caffeine (Excedrin Migraine) 250-250-65 mg tablet, Take 1 tablet by mouth every 6 hours if needed for headaches., Disp: , Rfl:     budesonide-formoteroL (Symbicort) 160-4.5 mcg/actuation inhaler, Inhale 2 puffs 2 times a day as needed., Disp: , Rfl:     carvedilol (Coreg) 12.5 mg tablet, Take 1 tablet (12.5 mg) by mouth 2 times a day with meals., Disp: 180 tablet, Rfl: 3    co-enzyme Q-10 30 mg capsule, Take 1 capsule (30 mg) by mouth once daily., Disp: , Rfl:     cyclobenzaprine (Flexeril) 10 mg tablet, Take 1 tablet (10 mg) by mouth 3 times a day as needed for muscle spasms., Disp: 90 tablet, Rfl: 0    evolocumab (Repatha SureClick) 140 mg/mL injection, Inject 1 mL (140 mg) under the skin every 14 (fourteen) days., Disp: 6 each, Rfl: 4    fenofibrate (Tricor) 145 mg tablet, TAKE 1 TABLET (145 MG) BY MOUTH ONCE DAILY. TAKE WITH FOOD., Disp: 90 tablet, Rfl: 0    fluticasone (Flonase) 50 mcg/actuation nasal spray, Administer 1 spray into each nostril 2 times a day as needed for rhinitis., Disp: 16 g, Rfl: 1    folic acid (Folvite) 1 mg tablet, TAKE 1  TABLET BY MOUTH EVERY DAY, Disp: 90 tablet, Rfl: 0    ibuprofen 400 mg tablet, Take 1 tablet (400 mg) by mouth every 6 hours if needed for moderate pain (4 - 6)., Disp: , Rfl:     metFORMIN (Glucophage) 500 mg tablet, TAKE 2 TABLETS (1,000 MG) BY MOUTH 2 TIMES A DAY. HOLD FOR 48 HOURS DUE TO CONTRAST, Disp: 360 tablet, Rfl: 1    omeprazole (PriLOSEC) 40 mg DR capsule, TAKE 1 CAPSULE BY MOUTH EVERY DAY, Disp: 90 capsule, Rfl: 0    rosuvastatin (Crestor) 40 mg tablet, TAKE 1 TABLET BY MOUTH EVERY DAY, Disp: 90 tablet, Rfl: 0    semaglutide (Ozempic) 0.25 mg or 0.5 mg (2 mg/3 mL) pen injector, Inject 0.5 mg under the skin 1 (one) time per week., Disp: 3 mL, Rfl: 0    triamcinolone (Kenalog) 0.1 % ointment, Triamcinolone Acetonide 0.1 % External Ointment  Quantity: 80  Refills: 0      Start : 20-Aug-2020  Active, Disp: , Rfl:     Vascepa 1 gram capsule, TAKE 1 CAPSULE (1 G) BY MOUTH ONCE DAILY., Disp: 90 capsule, Rfl: 0    chlorhexidine (Peridex) 0.12 % solution, Swish for 30 seconds and spit 15mL of solution the night before and morning of surgery, Disp: 475 mL, Rfl: 0    OneTouch Delica Plus Lancet 30 gauge misc, Apply 1 Lancet topically once daily., Disp: 100 each, Rfl: 0    OneTouch Ultra Test strip, 1 strip 2 times a day., Disp: 200 strip, Rfl: 1         PAT ROS:   Constitutional:    no fever   no chills   no unexpected weight change  Neuro/Psych:    no numbness   no weakness   no light-headedness   no confusion  Eyes:    no discharge   no pain   no vision loss   no diplopia   no visual disturbance   use of corrective lenses  Ears:    no ear pain   no hearing loss   no tinnitus  Nose:    no nasal discharge   no sinus congestion   no epistaxis  Mouth:    no dental issues   no mouth pain   no oral bleeding   no mouth lesions  Throat:    no throat pain   no dysphagia  Neck:    no neck pain   no neck stiffness  Cardio:    Functional 4 Mets. Patient denies SOB walking up 1 flights of stairs   Golfing, bowling, walking  1.7 miles weekly;    no chest pain   no palpitations   no peripheral edema   no dyspnea   no SPICER  Respiratory:    no cough   no wheezing   no hemoptysis   no shortness of breath  Endocrine:    no cold intolerance   no heat intolerance  GI:    no abdominal distention   no abdominal pain   no constipation   no diarrhea   no nausea   no vomiting   no blood in stool  :    no difficulty urinating   no dysuria   no oliguria  Musculoskeletal:    arthralgias (left knee; LBP, b/l hands)   no myalgias   no decreased ROM  Hematologic:    does not bruise/bleed easily   no excessive bleeding   no history of blood transfusion   no blood clots  Skin:   no skin changes   no sores/wound   no rash      Physical Exam  Constitutional:       General: He is not in acute distress.     Appearance: Normal appearance. He is not ill-appearing, toxic-appearing or diaphoretic.   HENT:      Head: Normocephalic and atraumatic.      Nose: Nose normal. No rhinorrhea.      Mouth/Throat:      Pharynx: No oropharyngeal exudate.   Eyes:      Extraocular Movements: Extraocular movements intact.      Conjunctiva/sclera: Conjunctivae normal.   Cardiovascular:      Rate and Rhythm: Normal rate and regular rhythm.      Heart sounds: No murmur heard.     No friction rub. No gallop.      Comments: Functional 4 Mets. Patient denies SOB walking up 2 flights of stairs     Pulmonary:      Effort: Pulmonary effort is normal. No respiratory distress.      Breath sounds: Normal breath sounds. No stridor. No wheezing or rhonchi.   Abdominal:      General: Bowel sounds are normal. There is no distension.      Palpations: Abdomen is soft. There is no mass.      Tenderness: There is no abdominal tenderness. There is no guarding or rebound.      Hernia: No hernia is present.   Musculoskeletal:         General: Tenderness (left knee; pain with flex/ext) present. No swelling, deformity or signs of injury. Normal range of motion.      Cervical back: Normal range of motion  "and neck supple. No rigidity or tenderness.   Skin:     General: Skin is warm and dry.      Coloration: Skin is not jaundiced or pale.      Findings: No bruising, erythema, lesion or rash.   Neurological:      General: No focal deficit present.      Mental Status: He is alert and oriented to person, place, and time.      Cranial Nerves: No cranial nerve deficit.      Sensory: No sensory deficit.      Motor: No weakness.      Coordination: Coordination normal.   Psychiatric:         Mood and Affect: Mood normal.         Behavior: Behavior normal.          PAT AIRWAY:   Airway:     Mallampati::  II    Neck ROM::  Full   No broken teeth, no dentures and no missing teeth  False front tooth (permanent)        Visit Vitals  /71   Pulse 61   Temp 36.3 °C (97.3 °F)   Resp 16   Ht 1.88 m (6' 2\")   Wt 125 kg (276 lb 0.3 oz)   SpO2 98%   BMI 35.44 kg/m²   Smoking Status Every Day   BSA 2.55 m²      LABS:  Lab Results   Component Value Date    HGBA1C 7.3 (H) 04/19/2024      Lab Results   Component Value Date    WBC 6.8 06/10/2024    HGB 14.0 06/10/2024    HCT 41.9 06/10/2024    MCV 90 06/10/2024     06/10/2024      Lab Results   Component Value Date    GLUCOSE 127 (H) 06/10/2024    CALCIUM 9.4 06/10/2024     06/10/2024    K 4.5 06/10/2024    CO2 26 06/10/2024     06/10/2024    BUN 23 06/10/2024    CREATININE 0.92 06/10/2024      Lab Results   Component Value Date    HGBA1C 7.2 (H) 06/10/2024        EKG 3/7/24  Normal sinus rhythm  Normal ECG     Cardiac cath 3/11//24  Coronary Lesion Summary:  Vessel            Stenosis      Vessel Segment  LAD           40-50% stenosis         mid  1st Diagonal    40% stenosis    proximal to mid  1st Diagonal    80% stenosis          mid  OM 1            70% stenosis       proximal  Ramus        10 to 30% stenosis    proximal  RCA             30% stenosis        distal     Recommendations:  Maximize medical therapy.  Consider PCI if symptoms despite medical therapy.   " "  _________________________________________________________________________  CONCLUSIONS:   1. Severe branch vessel CAD (D1 and OM1) in a right dominant system.   2.  of small inferior branch of OM1.   3. Elevated LVEDP.   4. No evidence of aortic stenosis.     Stress test 1/31/19  IMPRESSION:  Normal stress myocardial perfusion imaging in response to  pharmacologic stress.     Well preserved ejection function.     ECHO 1/31/19  CONCLUSIONS:   1. The left ventricular systolic function is normal with a 60-65% estimated ejection fraction.   2. Moderately increased left ventricular septal thickness.   3. Spectral Doppler shows an impaired relaxation pattern of left ventricular diastolic filling.   4. RVSP within normal limits.   5. Aortic valve stenosis is not present.   6. The pulmonary artery is not well visualized.        Assessment and Plan:      Patient is a 63-year-old male scheduled for a Left Knee Total Arthroplasty - Left with Dr. Ponce Robles on 4/25/24.     Patient has no active cardiac symptoms.   Patient denies any chest pain, tightness, heaviness, pressure, radiating pain, palpitations, irregular heartbeats, lightheadedness, cough, congestion, shortness of breath, SPICER, PND, near syncope, weight loss or gain.     RCRI  0  , 3.9 % Risk of MACE     Cardiac:  HTN - cont carvedilol and amlodipine on dos  Encouraged lifestyle modifications, low-sodium diet, and increase activity as tolerated.  Monitor BP and follow up with managing physician for readings sustaining >140/90.      HLD - hold vascepa dos; hold fenofibrate 24 hours before surgery; cont statin on dos      CAD - cardiac cath 3/11/24;   1st Diagonal    80% stenosis          mid  OM 1            70% stenosis       proximal     Cardiac clearance pending appointment with Noreen Lay PA-C 4/11/24:     \"He is at acceptable risk for knee surgery. He should remain on Aspirin without interruption perioperatively.  No further cardiac testing required. He " "should continue his other cardiovascular medications. He will follow up in 6 months. \"     Pulmonary:  CHAD - Known CHAD- No CPAP. Recommend prioritizing  nonopioid analgesic techniques (regional and local anesthesia, nonsteroidal medications, etc) before the administration of opioids and close monitoring for hypoventilation after surgery due to CHAD. Increased vigilance is recommended with the use of narcotics due to an increased risk for opioid induced respiratory depression     COPD - cont inhalers as prescribed, including dos     Nicotine dependence - Patient has history of nicotine dependency. Smoking cessation education was provided to the patient.Cessation encouraged. - Physiological and physical aspects of tobacco addiction as well as strategies for quitting were discussed. - Counseling was given focusing on the harmful effects of this addiction especially given the patient's medical condition(s) which will be worsened because of the chemicals in tobacco   He has not had a cigarette in 2 weeks; pt encouraged to continue with smoking cessation goals     Endocrine:  DMII - hold metformin on dos ; hold ozempic 7 days prior to surgery  HgbA1c 3/8/24 = 7.5%  Hgba1c 4/11/24 = 7.6%  4/19/24 = 7.3%  6/10/24 = 7.2%     Hematology:  Patient instructed to ambulate as soon as possible postoperatively to decrease thromboembolic risk.   Initiate mechanical DVT prophylaxis as soon as possible and initiate chemical prophylaxis when deemed safe from a bleeding standpoint post surgery.      LABS: CBC, BMP and A1c ordered. Lab results reviewed and show elevated nonfasting blood glucose of 127mg/dL     Followup: A1c pending    Addendum 6/11/24:  A1c results reviewed and meet [facility] criteria to proceed with TJR as planned.      STOP BANG: male, HTN, obese, >50 = 4 (+CHAD)     Marlini: 8        "

## 2024-06-10 NOTE — PREPROCEDURE INSTRUCTIONS
Medication List            Accurate as of Cierra 10, 2024  9:59 AM. Always use your most recent med list.                acetaminophen 650 mg ER tablet  Commonly known as: Tylenol 8 HOUR  Medication Adjustments for Surgery: Take morning of surgery with sip of water, no other fluids  Notes to patient: If needed     allopurinol 300 mg tablet  Commonly known as: Zyloprim  TAKE 1 TABLET BY MOUTH EVERY DAY  Medication Adjustments for Surgery: Take morning of surgery with sip of water, no other fluids     amLODIPine 10 mg tablet  Commonly known as: Norvasc  TAKE 1 TABLET BY MOUTH EVERY DAY  Medication Adjustments for Surgery: Take morning of surgery with sip of water, no other fluids     aspirin 81 mg chewable tablet  Chew 1 tablet (81 mg) once daily.  Medication Adjustments for Surgery: Take morning of surgery with sip of water, no other fluids     aspirin-acetaminophen-caffeine 250-250-65 mg tablet  Commonly known as: Excedrin Migraine  Medication Adjustments for Surgery: Stop 7 days before surgery     carvedilol 12.5 mg tablet  Commonly known as: Coreg  Take 1 tablet (12.5 mg) by mouth 2 times a day with meals.  Medication Adjustments for Surgery: Take morning of surgery with sip of water, no other fluids     chlorhexidine 0.12 % solution  Commonly known as: Peridex  Swish for 30 seconds and spit 15mL of solution the night before and morning of surgery     co-enzyme Q-10 30 mg capsule  Medication Adjustments for Surgery: Stop 7 days before surgery     cyclobenzaprine 10 mg tablet  Commonly known as: Flexeril  Take 1 tablet (10 mg) by mouth 3 times a day as needed for muscle spasms.  Medication Adjustments for Surgery: Take morning of surgery with sip of water, no other fluids  Notes to patient: If needed     fenofibrate 145 mg tablet  Commonly known as: Tricor  TAKE 1 TABLET (145 MG) BY MOUTH ONCE DAILY. TAKE WITH FOOD.  Medication Adjustments for Surgery: Stop 1 day before surgery     fluticasone 50 mcg/actuation  nasal spray  Commonly known as: Flonase  Administer 1 spray into each nostril 2 times a day as needed for rhinitis.  Notes to patient: Ok to take morning of surgery if needed     folic acid 1 mg tablet  Commonly known as: Folvite  TAKE 1 TABLET BY MOUTH EVERY DAY  Medication Adjustments for Surgery: Continue until night before surgery     ibuprofen 400 mg tablet  Medication Adjustments for Surgery: Stop 7 days before surgery     metFORMIN 500 mg tablet  Commonly known as: Glucophage  TAKE 2 TABLETS (1,000 MG) BY MOUTH 2 TIMES A DAY. HOLD FOR 48 HOURS DUE TO CONTRAST  Medication Adjustments for Surgery: Continue until night before surgery     omeprazole 40 mg DR capsule  Commonly known as: PriLOSEC  TAKE 1 CAPSULE BY MOUTH EVERY DAY  Medication Adjustments for Surgery: Take morning of surgery with sip of water, no other fluids     OneTouch Delica Plus Lancet 30 gauge misc  Generic drug: lancets  Apply 1 Lancet topically once daily.     OneTouch Ultra Test strip  Generic drug: blood sugar diagnostic  1 strip 2 times a day.     Ozempic 0.25 mg or 0.5 mg (2 mg/3 mL) pen injector  Generic drug: semaglutide  Inject 0.5 mg under the skin 1 (one) time per week.  Medication Adjustments for Surgery: Stop 7 days before surgery     Repatha SureClick 140 mg/mL injection  Generic drug: evolocumab  Inject 1 mL (140 mg) under the skin every 14 (fourteen) days.  Medication Adjustments for Surgery: Continue until night before surgery     rosuvastatin 40 mg tablet  Commonly known as: Crestor  TAKE 1 TABLET BY MOUTH EVERY DAY  Medication Adjustments for Surgery: Take morning of surgery with sip of water, no other fluids     Symbicort 160-4.5 mcg/actuation inhaler  Generic drug: budesonide-formoteroL  Notes to patient: Ok to use morning of surgery if needed     triamcinolone 0.1 % ointment  Commonly known as: Kenalog  Medication Adjustments for Surgery: Continue until night before surgery     Vascepa 1 gram capsule  Generic drug:  icosapent ethyL  TAKE 1 CAPSULE (1 G) BY MOUTH ONCE DAILY.  Medication Adjustments for Surgery: Continue until night before surgery                            **Concerning above medication instructions, if medication is normally taken at night, continue normal schedule.**  **DO NOT TAKE NIGHT PRIOR AND MORNING OF SURGERY**    CONTACT SURGEON'S OFFICE IF YOU DEVELOP:  * Fever = 100.4 F   * New respiratory symptoms (e.g. cough, shortness of breath, respiratory distress, sore throat)  * Recent loss of taste or smell  *Flu like symptoms such as headache, fatigue or gastrointestinal symptoms  * You develop any open sores, shingles, burning or painful urination   AND/OR:  * You no longer wish to have the surgery.  * Any other personal circumstances change that may lead to the need to cancel or defer this surgery.  *You were admitted to any hospital within one week of your planned procedure.    SMOKING:  *Quitting smoking can make a huge difference to your health and recovery from surgery.    *If you need help with quitting, call 1-231-QUIT-NOW.    THE DAY BEFORE SURGERY:   Nothing by mouth (no solids or liquids) after midnight.   If diabetic, please check fasting blood sugar upon waking up.    If fasting sugar is <80 mg/dl, please drink 100ml/3oz of apple juice no later than 2 hours prior to arrival time.      SURGICAL TIME  *You will be contacted between 2 p.m. and 6 p.m. the business day before your surgery with your arrival time.  *If you haven't received a call by 6pm, call [phone].  *Scheduled surgery times may change and you will be notified if this occurs-check your personal voicemail for any updates.    ON THE MORNING OF SURGERY:  *Wear comfortable, loose fitting clothing.   *Do not use moisturizers, creams, lotions or perfume.  *All jewelry and valuables should be left at home.  *Prosthetic devices such as contact lenses, hearing aids, dentures, eyelash extensions, hairpins and body piercing must be removed  before surgery.    BRING WITH YOU:  *Photo ID and insurance card  *Current list of medicines and allergies  *Pacemaker/Defibrillator/Heart stent cards  *CPAP machine and mask  *Slings/splints/crutches  *Copy of your complete Advanced Directive/DHPOA-if applicable  *Neurostimulator implant remote    PARKING AND ARRIVAL:  *Check in at the Main Entrance desk and let them know you are here for surgery.  *You will be directed to the 2nd floor surgical waiting area.    AFTER OUTPATIENT SURGERY:  *A responsible adult MUST accompany you at the time of discharge and stay with you for 24 hours after your surgery.  *You may NOT drive yourself home after surgery.  *You may use a taxi or ride sharing service ([employer], Uber) to return home ONLY if you are accompanied by a friend or family member.  *Instructions for resuming your medications will be provided by your surgeon.         Patient Information: Oral/Dental Rinse  **This is a prescription; pick it up at your preferred local pharmacy **  What is oral/dental rinse?   It is a mouthwash. It is a way of cleaning the mouth with a germ killing solution before your surgery.  The solution contains chlorhexidine, commonly known as CHG.   It is used inside the mouth to kill a bacteria known as Staphylococcus aureus.  Let your doctor know if you are allergic to Chlorhexidine.    Why do I need to use CHG oral/dental rinse?  The CHG oral/dental rinse helps to kill a bacteria in your mouth known a Staphylococcus aureus.     This reduces the risk of infection at the surgical site.      Using your CHG oral/dental rinse  STEPS:  Use your CHG oral/dental rinse after you brush your teeth the night before (at bedtime) and the morning of your surgery.  Follow all directions on your prescription label.    Use the cap on the container to measure 15ml (fill cap to fill line)  Swish (gargle if you can) the mouthwash in your mouth for at least 30 seconds, (do not to swallow) spit out  After you use  your CHG rinse, do not rinse your mouth with water, drink or eat.  Please refer to prescription label for the appropriate time to resume oral intake  Dental rinse comes in one size bottle: 473ml ~16oz.  You will have leftover    rinse, discard after this use.    What side effects might I have using the CHG oral/dental rinse?  CHG rinse will stick to plaque on the teeth.  Brush and floss just before use.  Teeth brushing will help avoid staining of plaque during use.    Who should I contact if I have questions about the CHG oral/dental rinse?  Please call [facility], Preadmission Testing at [phone] if you have any questions        Home Preoperative Antibacterial Shower     What is a home preoperative antibacterial shower?  This shower is a way of cleaning the skin with a germ killing soap before surgery.  The soap contains chlorhexidine, commonly known as CHG.  CHG is a soap for your skin with germ killing ability.  Let your doctor know if you are allergic to chlorhexidine.    Why do I need to take a preoperative antibacterial shower?  Skin is not sterile.  It is best to try to make your skin as free of germs as possible before surgery.  Proper cleansing with a germ killing soap before surgery can lower the number of germs on your skin.  This helps to reduce the risk of infection at the surgical site.  Following the instructions listed below will help you prepare your skin for surgery.      How do I use the CHG skin cleanser?  Steps:  Begin using your CHG soap five days before your scheduled surgery on ________________________.    Days 1-4 Shower before bed:  Wash your face and genitals with your normal soap and rinse.    Wash and rinse your hair using the CHG soap. Rinse completely, do not condition your   Hair.          3.    Apply the CHG soap to a clean wet washcloth.  Turn the water off or move away                From the water spray to avoid premature rinsing of the CHG soap  as you are applying.     4.   Lather your entire body from the neck down.  Do not use on your face or genitals.   Pay special attention to the area(s) where your incision(s) will be located unless they are on your face.  Avoid scrubbing your skin too hard.  The important point is to have the CHG soap sit on your skin for 3 minutes.    When the 3 minutes are up, turn on the water and rinse the CHG soap off your body completely.   Pat yourself dry with a clean, freshly-laundered towel.  Dress in clean, freshly laundered night clothes.    Be sure to sleep with clean, freshly laundered sheets.  Day 5:  Last shower is the morning before surgery: Follow above Instructions.    NOTE:    *Hair extensions should be removed    *Keep CHG soap out of eyes and ear canals   *DO NOT wash with regular soap on your body after you have used the CHG        soap solution  *DO NOT apply powders, lotions, or perfume.  *Deodorant may be used days 1-4, BUT NOT the day of surgery    Who should I contact if I have any questions regarding the use of CHG soap?  Call [facility], Preadmission Testing at [phone] if you have any questions.              Patient Information: Pre-Operative Infection Prevention Measures     Why did I have my nose, under my arms and groin swabbed?  The purpose of the swab is to identify Staphylococcus aureus inside your nose or on your skin.  The swab was sent to the laboratory for culture.  A positive swab/culture for Staphylococcus aureus is called colonization or carriage.      What is Staphylococcus aureus?  Staphylococcus aureus, also known as “staph”, is a germ found on the skin or in the nose of healthy people.  Sometimes Staphylococcus aureus can get into the body and cause an infection.  This can be minor (such as pimples, boils or other skin problems).  It might also be serious (such as blood infection, pneumonia or a surgical site infection).    What is Staphylococcus  aureus colonization or carriage?  Colonization or carriage means that a person has the germ but is not sick from it.  These bacteria can be spread on the hands or when breathing or sneezing.    How is Staphylococcus aureus spread?  It is most often spread by close contact with a person or item that carries it.    What happens if my culture is positive for Staphylococcus aureus?  Your doctor/medical team will use this information to guide any antibiotic treatment which may be necessary.  Regardless of the culture results, we will clean the inside of your nose with a betadine swab just before you have your surgery.      Will I get an infection if I have Staphylococcus aureus in my nose or on my skin?  Anyone can get an infection with Staphylococcus aureus.  However, the best way to reduce your risk of infection is to follow the instructions provided to you for the use of your CHG soap and dental rinse.        Who should I contact if I have any questions?  Call the [facility], Preadmission Testing at [phone] if you have any questions.

## 2024-06-10 NOTE — H&P (VIEW-ONLY)
[facility]/PAT Evaluation       Name: Hammad Blackburn (Hammad Blackburn)  /Age: 1960/63 y.o.       Date of Consult: 06/10/24     Referring Provider: Dr. Ponce Robles    Surgery, Date, and Length: Left Knee Total Arthroplasty - Left , 24, 180MIN    Hammad Blackburn is a 63 year-old male who presents to the [facility] for perioperative risk assessment prior to surgery.    Patient presents with a primary diagnosis of left knee osteoarthritis.  left knee osteoarthritis. Pt refers to left knee pain x 2-3 years.  He has tried steroid joint injections which provided about 1-2 months of pain relief.  He has tried ibuprofen which provides some mild pain relief. Pt refers walking up stairs or up incline and getting up from seated position makes pain worse.  Rest and elevation makes pain better.  Pt denies any falls.  Pt wishes to proceed with surgery as planned. Of note, he was scheduled on two or three other occasions for this same TJR and was rescheduled due to elevated A1c level.     This note was created in part upon personal review of patient's medical records.      Patient is scheduled to have Left Knee Total Arthroplasty - Left      Pt denies any past history of anesthetic complications such as PONV, awareness, prolonged sedation, dental damage, aspiration, cardiac arrest, difficult intubation, difficult I.V. access or unexpected hospital admissions.  NO malignant hyperthermia and or pseudocholinesterase deficiency.  No history of blood transfusions     The patient is not a [employer] and will accept blood and blood products if medically indicated.   Type and screen sent.     Past Medical History:   Diagnosis Date    Abnormal stress test     followed by heart cath    Anxiety disorder, unspecified     Arthritis     Cardiology follow-up encounter     Scheduled with JACOBO Lay NP for 2024    COPD (chronic obstructive pulmonary disease) (Multi)     Coronary atherosclerosis     Diabetes mellitus (Multi)     Elevated  prostate specific antigen (PSA)     GERD (gastroesophageal reflux disease)     H/O percutaneous left heart catheterization 03/11/2024    1. Severe branch vessel CAD (D1 and OM1) in a right dominant system.  2.  of small inferior branch of OM1.  3. Elevated LVEDP.  4. No evidence of aortic stenosis.    History of depression     Hyperlipidemia     Hypertension     Nephrolithiasis     Sleep apnea     Tobacco use     Unilateral primary osteoarthritis, left knee        Past Surgical History:   Procedure Laterality Date    ADENOIDECTOMY      APPENDECTOMY      CARDIAC CATHETERIZATION N/A 03/11/2024    Procedure: Left Heart Cath with Coronary Angiography and LV;  Surgeon: Alan Davis MD;  Location: [facility] Cardiac Cath Lab;  Service: Cardiovascular;  Laterality: N/A;  Scheduled 3/11/24 @ 10:30 am    COLONOSCOPY      HERNIA REPAIR      KNEE SURGERY      Knee Arthroscopy With Medial And Lateral Meniscus Repair    TONSILLECTOMY         Patient  has no history on file for sexual activity.    Family History   Problem Relation Name Age of Onset    COPD Mother      Hypertension Father      Diabetes Father      Coronary artery disease Father  40 - 49        s/p CABG     Social History     Socioeconomic History    Marital status:      Spouse name: Not on file    Number of children: Not on file    Years of education: Not on file    Highest education level: Not on file   Occupational History    Not on file   Tobacco Use    Smoking status: Every Day     Current packs/day: 0.50     Average packs/day: 0.5 packs/day for 43.4 years (21.7 ttl pk-yrs)     Types: Cigarettes     Start date: 1981    Smokeless tobacco: Never    Tobacco comments:     continues to smoke 3 to 5 cigarettes a day   Vaping Use    Vaping status: Never Used   Substance and Sexual Activity    Alcohol use: Yes     Comment: 4-6 alcoholic beverages a week    Drug use: Never    Sexual activity: Not on file   Other Topics Concern    Not on file   Social History  Narrative    Not on file     Social Determinants of Health     Financial Resource Strain: Not on file   Food Insecurity: Not on file   Transportation Needs: Not on file   Physical Activity: Not on file   Stress: Not on file   Social Connections: Not on file   Intimate Partner Violence: Not on file   Housing Stability: Not on file        No Known Allergies    Current Outpatient Medications:     acetaminophen (Tylenol 8 HOUR) 650 mg ER tablet, Take 1 tablet (650 mg) by mouth every 8 hours if needed for mild pain (1 - 3). Do not crush, chew, or split., Disp: , Rfl:     allopurinol (Zyloprim) 300 mg tablet, TAKE 1 TABLET BY MOUTH EVERY DAY, Disp: 90 tablet, Rfl: 0    amLODIPine (Norvasc) 10 mg tablet, TAKE 1 TABLET BY MOUTH EVERY DAY, Disp: 90 tablet, Rfl: 0    aspirin 81 mg chewable tablet, Chew 1 tablet (81 mg) once daily., Disp: 90 tablet, Rfl: 3    aspirin-acetaminophen-caffeine (Excedrin Migraine) 250-250-65 mg tablet, Take 1 tablet by mouth every 6 hours if needed for headaches., Disp: , Rfl:     budesonide-formoteroL (Symbicort) 160-4.5 mcg/actuation inhaler, Inhale 2 puffs 2 times a day as needed., Disp: , Rfl:     carvedilol (Coreg) 12.5 mg tablet, Take 1 tablet (12.5 mg) by mouth 2 times a day with meals., Disp: 180 tablet, Rfl: 3    co-enzyme Q-10 30 mg capsule, Take 1 capsule (30 mg) by mouth once daily., Disp: , Rfl:     cyclobenzaprine (Flexeril) 10 mg tablet, Take 1 tablet (10 mg) by mouth 3 times a day as needed for muscle spasms., Disp: 90 tablet, Rfl: 0    evolocumab (Repatha SureClick) 140 mg/mL injection, Inject 1 mL (140 mg) under the skin every 14 (fourteen) days., Disp: 6 each, Rfl: 4    fenofibrate (Tricor) 145 mg tablet, TAKE 1 TABLET (145 MG) BY MOUTH ONCE DAILY. TAKE WITH FOOD., Disp: 90 tablet, Rfl: 0    fluticasone (Flonase) 50 mcg/actuation nasal spray, Administer 1 spray into each nostril 2 times a day as needed for rhinitis., Disp: 16 g, Rfl: 1    folic acid (Folvite) 1 mg tablet, TAKE 1  TABLET BY MOUTH EVERY DAY, Disp: 90 tablet, Rfl: 0    ibuprofen 400 mg tablet, Take 1 tablet (400 mg) by mouth every 6 hours if needed for moderate pain (4 - 6)., Disp: , Rfl:     metFORMIN (Glucophage) 500 mg tablet, TAKE 2 TABLETS (1,000 MG) BY MOUTH 2 TIMES A DAY. HOLD FOR 48 HOURS DUE TO CONTRAST, Disp: 360 tablet, Rfl: 1    omeprazole (PriLOSEC) 40 mg DR capsule, TAKE 1 CAPSULE BY MOUTH EVERY DAY, Disp: 90 capsule, Rfl: 0    rosuvastatin (Crestor) 40 mg tablet, TAKE 1 TABLET BY MOUTH EVERY DAY, Disp: 90 tablet, Rfl: 0    semaglutide (Ozempic) 0.25 mg or 0.5 mg (2 mg/3 mL) pen injector, Inject 0.5 mg under the skin 1 (one) time per week., Disp: 3 mL, Rfl: 0    triamcinolone (Kenalog) 0.1 % ointment, Triamcinolone Acetonide 0.1 % External Ointment  Quantity: 80  Refills: 0      Start : 20-Aug-2020  Active, Disp: , Rfl:     Vascepa 1 gram capsule, TAKE 1 CAPSULE (1 G) BY MOUTH ONCE DAILY., Disp: 90 capsule, Rfl: 0    chlorhexidine (Peridex) 0.12 % solution, Swish for 30 seconds and spit 15mL of solution the night before and morning of surgery, Disp: 475 mL, Rfl: 0    OneTouch Delica Plus Lancet 30 gauge misc, Apply 1 Lancet topically once daily., Disp: 100 each, Rfl: 0    OneTouch Ultra Test strip, 1 strip 2 times a day., Disp: 200 strip, Rfl: 1         PAT ROS:   Constitutional:    no fever   no chills   no unexpected weight change  Neuro/Psych:    no numbness   no weakness   no light-headedness   no confusion  Eyes:    no discharge   no pain   no vision loss   no diplopia   no visual disturbance   use of corrective lenses  Ears:    no ear pain   no hearing loss   no tinnitus  Nose:    no nasal discharge   no sinus congestion   no epistaxis  Mouth:    no dental issues   no mouth pain   no oral bleeding   no mouth lesions  Throat:    no throat pain   no dysphagia  Neck:    no neck pain   no neck stiffness  Cardio:    Functional 4 Mets. Patient denies SOB walking up 1 flights of stairs   Golfing, bowling, walking  1.7 miles weekly;    no chest pain   no palpitations   no peripheral edema   no dyspnea   no SPICER  Respiratory:    no cough   no wheezing   no hemoptysis   no shortness of breath  Endocrine:    no cold intolerance   no heat intolerance  GI:    no abdominal distention   no abdominal pain   no constipation   no diarrhea   no nausea   no vomiting   no blood in stool  :    no difficulty urinating   no dysuria   no oliguria  Musculoskeletal:    arthralgias (left knee; LBP, b/l hands)   no myalgias   no decreased ROM  Hematologic:    does not bruise/bleed easily   no excessive bleeding   no history of blood transfusion   no blood clots  Skin:   no skin changes   no sores/wound   no rash      Physical Exam  Constitutional:       General: He is not in acute distress.     Appearance: Normal appearance. He is not ill-appearing, toxic-appearing or diaphoretic.   HENT:      Head: Normocephalic and atraumatic.      Nose: Nose normal. No rhinorrhea.      Mouth/Throat:      Pharynx: No oropharyngeal exudate.   Eyes:      Extraocular Movements: Extraocular movements intact.      Conjunctiva/sclera: Conjunctivae normal.   Cardiovascular:      Rate and Rhythm: Normal rate and regular rhythm.      Heart sounds: No murmur heard.     No friction rub. No gallop.      Comments: Functional 4 Mets. Patient denies SOB walking up 2 flights of stairs     Pulmonary:      Effort: Pulmonary effort is normal. No respiratory distress.      Breath sounds: Normal breath sounds. No stridor. No wheezing or rhonchi.   Abdominal:      General: Bowel sounds are normal. There is no distension.      Palpations: Abdomen is soft. There is no mass.      Tenderness: There is no abdominal tenderness. There is no guarding or rebound.      Hernia: No hernia is present.   Musculoskeletal:         General: Tenderness (left knee; pain with flex/ext) present. No swelling, deformity or signs of injury. Normal range of motion.      Cervical back: Normal range of motion  "and neck supple. No rigidity or tenderness.   Skin:     General: Skin is warm and dry.      Coloration: Skin is not jaundiced or pale.      Findings: No bruising, erythema, lesion or rash.   Neurological:      General: No focal deficit present.      Mental Status: He is alert and oriented to person, place, and time.      Cranial Nerves: No cranial nerve deficit.      Sensory: No sensory deficit.      Motor: No weakness.      Coordination: Coordination normal.   Psychiatric:         Mood and Affect: Mood normal.         Behavior: Behavior normal.          PAT AIRWAY:   Airway:     Mallampati::  II    Neck ROM::  Full   No broken teeth, no dentures and no missing teeth  False front tooth (permanent)        Visit Vitals  /71   Pulse 61   Temp 36.3 °C (97.3 °F)   Resp 16   Ht 1.88 m (6' 2\")   Wt 125 kg (276 lb 0.3 oz)   SpO2 98%   BMI 35.44 kg/m²   Smoking Status Every Day   BSA 2.55 m²      LABS:  Lab Results   Component Value Date    HGBA1C 7.3 (H) 04/19/2024      Lab Results   Component Value Date    WBC 6.8 06/10/2024    HGB 14.0 06/10/2024    HCT 41.9 06/10/2024    MCV 90 06/10/2024     06/10/2024      Lab Results   Component Value Date    GLUCOSE 127 (H) 06/10/2024    CALCIUM 9.4 06/10/2024     06/10/2024    K 4.5 06/10/2024    CO2 26 06/10/2024     06/10/2024    BUN 23 06/10/2024    CREATININE 0.92 06/10/2024      Lab Results   Component Value Date    HGBA1C 7.2 (H) 06/10/2024        EKG 3/7/24  Normal sinus rhythm  Normal ECG     Cardiac cath 3/11//24  Coronary Lesion Summary:  Vessel            Stenosis      Vessel Segment  LAD           40-50% stenosis         mid  1st Diagonal    40% stenosis    proximal to mid  1st Diagonal    80% stenosis          mid  OM 1            70% stenosis       proximal  Ramus        10 to 30% stenosis    proximal  RCA             30% stenosis        distal     Recommendations:  Maximize medical therapy.  Consider PCI if symptoms despite medical therapy.   " "  _________________________________________________________________________  CONCLUSIONS:   1. Severe branch vessel CAD (D1 and OM1) in a right dominant system.   2.  of small inferior branch of OM1.   3. Elevated LVEDP.   4. No evidence of aortic stenosis.     Stress test 1/31/19  IMPRESSION:  Normal stress myocardial perfusion imaging in response to  pharmacologic stress.     Well preserved ejection function.     ECHO 1/31/19  CONCLUSIONS:   1. The left ventricular systolic function is normal with a 60-65% estimated ejection fraction.   2. Moderately increased left ventricular septal thickness.   3. Spectral Doppler shows an impaired relaxation pattern of left ventricular diastolic filling.   4. RVSP within normal limits.   5. Aortic valve stenosis is not present.   6. The pulmonary artery is not well visualized.        Assessment and Plan:      Patient is a 63-year-old male scheduled for a Left Knee Total Arthroplasty - Left with Dr. Ponce Robles on 4/25/24.     Patient has no active cardiac symptoms.   Patient denies any chest pain, tightness, heaviness, pressure, radiating pain, palpitations, irregular heartbeats, lightheadedness, cough, congestion, shortness of breath, SPICER, PND, near syncope, weight loss or gain.     RCRI  0  , 3.9 % Risk of MACE     Cardiac:  HTN - cont carvedilol and amlodipine on dos  Encouraged lifestyle modifications, low-sodium diet, and increase activity as tolerated.  Monitor BP and follow up with managing physician for readings sustaining >140/90.      HLD - hold vascepa dos; hold fenofibrate 24 hours before surgery; cont statin on dos      CAD - cardiac cath 3/11/24;   1st Diagonal    80% stenosis          mid  OM 1            70% stenosis       proximal     Cardiac clearance pending appointment with Noreen Lay PA-C 4/11/24:     \"He is at acceptable risk for knee surgery. He should remain on Aspirin without interruption perioperatively.  No further cardiac testing required. He " "should continue his other cardiovascular medications. He will follow up in 6 months. \"     Pulmonary:  CHAD - Known CHAD- No CPAP. Recommend prioritizing  nonopioid analgesic techniques (regional and local anesthesia, nonsteroidal medications, etc) before the administration of opioids and close monitoring for hypoventilation after surgery due to CHAD. Increased vigilance is recommended with the use of narcotics due to an increased risk for opioid induced respiratory depression     COPD - cont inhalers as prescribed, including dos     Nicotine dependence - Patient has history of nicotine dependency. Smoking cessation education was provided to the patient.Cessation encouraged. - Physiological and physical aspects of tobacco addiction as well as strategies for quitting were discussed. - Counseling was given focusing on the harmful effects of this addiction especially given the patient's medical condition(s) which will be worsened because of the chemicals in tobacco   He has not had a cigarette in 2 weeks; pt encouraged to continue with smoking cessation goals     Endocrine:  DMII - hold metformin on dos ; hold ozempic 7 days prior to surgery  HgbA1c 3/8/24 = 7.5%  Hgba1c 4/11/24 = 7.6%  4/19/24 = 7.3%  6/10/24 = 7.2%     Hematology:  Patient instructed to ambulate as soon as possible postoperatively to decrease thromboembolic risk.   Initiate mechanical DVT prophylaxis as soon as possible and initiate chemical prophylaxis when deemed safe from a bleeding standpoint post surgery.      LABS: CBC, BMP and A1c ordered. Lab results reviewed and show elevated nonfasting blood glucose of 127mg/dL     Followup: A1c pending    Addendum 6/11/24:  A1c results reviewed and meet [facility] criteria to proceed with TJR as planned.      STOP BANG: male, HTN, obese, >50 = 4 (+CHAD)     Marlini: 8        "

## 2024-06-10 NOTE — CPM/PAT NURSE NOTE
CPM/PAT Nurse Note      Name: Hammad Blackburn (Hammad Blackburn)  /Age: 1960/63 y.o.       Past Medical History:   Diagnosis Date    Abnormal stress test     followed by heart cath    Anxiety disorder, unspecified     Arthritis     Cardiology follow-up encounter     Scheduled with JACOBO Lay NP for 2024    COPD (chronic obstructive pulmonary disease) (Multi)     Coronary atherosclerosis     Diabetes mellitus (Multi)     Elevated prostate specific antigen (PSA)     GERD (gastroesophageal reflux disease)     H/O percutaneous left heart catheterization 2024    1. Severe branch vessel CAD (D1 and OM1) in a right dominant system.  2.  of small inferior branch of OM1.  3. Elevated LVEDP.  4. No evidence of aortic stenosis.    History of depression     Hyperlipidemia     Hypertension     Nephrolithiasis     Sleep apnea     Tobacco use     Unilateral primary osteoarthritis, left knee        Past Surgical History:   Procedure Laterality Date    ADENOIDECTOMY      APPENDECTOMY      CARDIAC CATHETERIZATION N/A 2024    Procedure: Left Heart Cath with Coronary Angiography and LV;  Surgeon: Alan Davis MD;  Location: [facility];  Service: Cardiovascular;  Laterality: N/A;  Scheduled 3/11/24 @ 10:30 am    COLONOSCOPY      HERNIA REPAIR      KNEE SURGERY      Knee Arthroscopy With Medial And Lateral Meniscus Repair    TONSILLECTOMY         Patient  has no history on file for sexual activity.    Family History   Problem Relation Name Age of Onset    COPD Mother      Hypertension Father      Diabetes Father      Coronary artery disease Father  40 - 49        s/p CABG       No Known Allergies    Prior to Admission medications    Medication Sig Start Date End Date Taking? Authorizing Provider   acetaminophen (Tylenol 8 HOUR) 650 mg ER tablet Take 1 tablet (650 mg) by mouth every 8 hours if needed for mild pain (1 - 3). Do not crush, chew, or split.    Historical Provider, MD   allopurinol (Zyloprim)  300 mg tablet TAKE 1 TABLET BY MOUTH EVERY DAY 6/3/24   Richard Garcia MD   amLODIPine (Norvasc) 10 mg tablet TAKE 1 TABLET BY MOUTH EVERY DAY 4/1/24   Richard Garcia MD   aspirin 81 mg chewable tablet Chew 1 tablet (81 mg) once daily. 3/7/24 3/7/25  Alan Davis MD   aspirin-acetaminophen-caffeine (Excedrin Migraine) 250-250-65 mg tablet Take 1 tablet by mouth every 6 hours if needed for headaches.    Historical Provider, MD   budesonide-formoteroL (Symbicort) 160-4.5 mcg/actuation inhaler Inhale 2 puffs 2 times a day as needed. 9/2/21   Historical Provider, MD   carvedilol (Coreg) 12.5 mg tablet Take 1 tablet (12.5 mg) by mouth 2 times a day with meals. 4/11/24 4/11/25  ENRICO Cali-CNP   chlorhexidine (Peridex) 0.12 % solution Swish for 30 seconds and spit 15mL of solution the night before and morning of surgery 4/11/24   Katja Puckett PA-C   chlorhexidine (Peridex) 0.12 % solution Swish for 30 seconds and spit 15mL of solution the night before and morning of surgery 6/10/24   Katja Puckett PA-C   co-enzyme Q-10 30 mg capsule Take 1 capsule (30 mg) by mouth once daily.    Historical Provider, MD   cyclobenzaprine (Flexeril) 10 mg tablet Take 1 tablet (10 mg) by mouth 3 times a day as needed for muscle spasms. 3/12/24   Richard Garcia MD   evolocumab (Repatha SureClick) 140 mg/mL injection Inject 1 mL (140 mg) under the skin every 14 (fourteen) days. 3/7/24   Alan Davis MD   fenofibrate (Tricor) 145 mg tablet TAKE 1 TABLET (145 MG) BY MOUTH ONCE DAILY. TAKE WITH FOOD. 4/1/24   Richard Garcia MD   fluticasone (Flonase) 50 mcg/actuation nasal spray Administer 1 spray into each nostril 2 times a day as needed for rhinitis. 4/16/24   Richard Garcia MD   folic acid (Folvite) 1 mg tablet TAKE 1 TABLET BY MOUTH EVERY DAY 5/9/24   Richard Garcia MD   ibuprofen 400 mg tablet Take 1 tablet (400 mg) by mouth every 6 hours if needed for moderate pain (4 - 6).     Historical Provider, MD   metFORMIN (Glucophage) 500 mg tablet TAKE 2 TABLETS (1,000 MG) BY MOUTH 2 TIMES A DAY. HOLD FOR 48 HOURS DUE TO CONTRAST 4/15/24   Richard Garcia MD   omeprazole (PriLOSEC) 40 mg DR capsule TAKE 1 CAPSULE BY MOUTH EVERY DAY 4/8/24   Stephen Tyler MD   OneTouch Delica Plus Lancet 30 gauge misc Apply 1 Lancet topically once daily. 3/12/24   Richard Garcia MD   OneTouch Ultra Test strip 1 strip 2 times a day. 5/22/24   Richard Garcia MD   rosuvastatin (Crestor) 40 mg tablet TAKE 1 TABLET BY MOUTH EVERY DAY 5/10/24   Richard Garcia MD   semaglutide (Ozempic) 0.25 mg or 0.5 mg (2 mg/3 mL) pen injector Inject 0.5 mg under the skin 1 (one) time per week. 6/9/24   Richard Garcia MD   triamcinolone (Kenalog) 0.1 % ointment Triamcinolone Acetonide 0.1 % External Ointment   Quantity: 80  Refills: 0        Start : 20-Aug-2020   Active 8/20/20   Historical Provider, MD   Vascepa 1 gram capsule TAKE 1 CAPSULE (1 G) BY MOUTH ONCE DAILY. 4/11/24   FERNANDO Stevens     DASI Risk Score    No data to display       Caprini DVT Assessment    No data to display       Modified Frailty Index    No data to display       CHADS2 Stroke Risk  Current as of 7 minutes ago        N/A 3 to 100%: High Risk   2 to < 3%: Medium Risk   0 to < 2%: Low Risk     Last Change: N/A          This score determines the patient's risk of having a stroke if the patient has atrial fibrillation.        This score is not applicable to this patient. Components are not calculated.          Revised Cardiac Risk Index    No data to display       Apfel Simplified Score    No data to display       Risk Analysis Index Results This Encounter    No data found in the last 1 encounters.         Nurse Plan of Action:       After Visit Summary (AVS) reviewed and patient verbalized good understanding of medications and NPO instructions.  Pre-op infection prevention measures:  CHG showers and  mouthwash reviewed, understanding voiced.  CHG soap given and patient verbalized need to pick CHG mouthwash at their preferred local pharmacy.

## 2024-06-12 ENCOUNTER — APPOINTMENT (OUTPATIENT)
Dept: ORTHOPEDIC SURGERY | Facility: CLINIC | Age: 64
End: 2024-06-12
Payer: COMMERCIAL

## 2024-06-14 ENCOUNTER — PHARMACY VISIT (OUTPATIENT)
Dept: PHARMACY | Facility: CLINIC | Age: 64
End: 2024-06-14
Payer: MEDICARE

## 2024-06-14 PROCEDURE — RXMED WILLOW AMBULATORY MEDICATION CHARGE

## 2024-06-16 ENCOUNTER — ANESTHESIA EVENT (OUTPATIENT)
Dept: OPERATING ROOM | Facility: HOSPITAL | Age: 64
End: 2024-06-16
Payer: COMMERCIAL

## 2024-06-16 PROBLEM — Z96.652 S/P TOTAL KNEE ARTHROPLASTY, LEFT: Status: ACTIVE | Noted: 2024-06-16

## 2024-06-16 RX ORDER — ACETAMINOPHEN 500 MG
1000 TABLET ORAL EVERY 8 HOURS
Qty: 60 TABLET | Refills: 1 | Status: SHIPPED | OUTPATIENT
Start: 2024-06-16 | End: 2024-07-06

## 2024-06-16 RX ORDER — PANTOPRAZOLE SODIUM 40 MG/1
40 TABLET, DELAYED RELEASE ORAL
Qty: 30 TABLET | Refills: 0 | Status: SHIPPED | OUTPATIENT
Start: 2024-06-16 | End: 2024-07-18

## 2024-06-16 RX ORDER — SENNOSIDES 8.6 MG/1
1 TABLET ORAL DAILY
Qty: 15 TABLET | Refills: 0 | Status: SHIPPED | OUTPATIENT
Start: 2024-06-16 | End: 2024-07-03

## 2024-06-16 RX ORDER — TRAMADOL HYDROCHLORIDE 50 MG/1
50-100 TABLET ORAL EVERY 6 HOURS PRN
Qty: 40 TABLET | Refills: 0 | Status: SHIPPED | OUTPATIENT
Start: 2024-06-16 | End: 2024-06-23

## 2024-06-16 RX ORDER — OXYCODONE HYDROCHLORIDE 5 MG/1
5-10 TABLET ORAL EVERY 6 HOURS PRN
Qty: 40 TABLET | Refills: 0 | Status: SHIPPED | OUTPATIENT
Start: 2024-06-16 | End: 2024-06-25

## 2024-06-16 RX ORDER — ONDANSETRON 4 MG/1
4 TABLET, FILM COATED ORAL EVERY 8 HOURS PRN
Qty: 20 TABLET | Refills: 0 | Status: SHIPPED | OUTPATIENT
Start: 2024-06-16

## 2024-06-16 RX ORDER — NAPROXEN SODIUM 220 MG/1
81 TABLET, FILM COATED ORAL 2 TIMES DAILY
Qty: 60 TABLET | Refills: 0 | Status: SHIPPED | OUTPATIENT
Start: 2024-06-16 | End: 2024-07-18

## 2024-06-16 RX ORDER — DOCUSATE SODIUM 100 MG/1
100 CAPSULE, LIQUID FILLED ORAL 2 TIMES DAILY
Qty: 30 CAPSULE | Refills: 0 | Status: SHIPPED | OUTPATIENT
Start: 2024-06-16 | End: 2024-07-03

## 2024-06-16 RX ORDER — CEFADROXIL 500 MG/1
500 CAPSULE ORAL 2 TIMES DAILY
Qty: 10 CAPSULE | Refills: 0 | Status: SHIPPED | OUTPATIENT
Start: 2024-06-16 | End: 2024-06-20 | Stop reason: SDUPTHER

## 2024-06-17 ENCOUNTER — APPOINTMENT (OUTPATIENT)
Dept: RADIOLOGY | Facility: HOSPITAL | Age: 64
End: 2024-06-17
Payer: COMMERCIAL

## 2024-06-17 ENCOUNTER — HOME HEALTH ADMISSION (OUTPATIENT)
Dept: HOME HEALTH SERVICES | Facility: HOME HEALTH | Age: 64
End: 2024-06-17
Payer: COMMERCIAL

## 2024-06-17 ENCOUNTER — ANESTHESIA (OUTPATIENT)
Dept: OPERATING ROOM | Facility: HOSPITAL | Age: 64
End: 2024-06-17
Payer: COMMERCIAL

## 2024-06-17 ENCOUNTER — HOSPITAL ENCOUNTER (OUTPATIENT)
Facility: HOSPITAL | Age: 64
Discharge: HOME | End: 2024-06-18
Attending: STUDENT IN AN ORGANIZED HEALTH CARE EDUCATION/TRAINING PROGRAM | Admitting: STUDENT IN AN ORGANIZED HEALTH CARE EDUCATION/TRAINING PROGRAM
Payer: COMMERCIAL

## 2024-06-17 DIAGNOSIS — Z96.652 STATUS POST LEFT KNEE REPLACEMENT: ICD-10-CM

## 2024-06-17 DIAGNOSIS — M17.12 ARTHRITIS OF LEFT KNEE: Primary | ICD-10-CM

## 2024-06-17 DIAGNOSIS — Z96.652 S/P TOTAL KNEE ARTHROPLASTY, LEFT: ICD-10-CM

## 2024-06-17 LAB
GLUCOSE BLD MANUAL STRIP-MCNC: 124 MG/DL (ref 74–99)
GLUCOSE BLD MANUAL STRIP-MCNC: 168 MG/DL (ref 74–99)
GLUCOSE BLD MANUAL STRIP-MCNC: 203 MG/DL (ref 74–99)

## 2024-06-17 PROCEDURE — 82947 ASSAY GLUCOSE BLOOD QUANT: CPT

## 2024-06-17 PROCEDURE — 2500000001 HC RX 250 WO HCPCS SELF ADMINISTERED DRUGS (ALT 637 FOR MEDICARE OP)

## 2024-06-17 PROCEDURE — 3600000017 HC OR TIME - EACH INCREMENTAL 1 MINUTE - PROCEDURE LEVEL SIX: Performed by: STUDENT IN AN ORGANIZED HEALTH CARE EDUCATION/TRAINING PROGRAM

## 2024-06-17 PROCEDURE — 2780000003 HC OR 278 NO HCPCS: Performed by: STUDENT IN AN ORGANIZED HEALTH CARE EDUCATION/TRAINING PROGRAM

## 2024-06-17 PROCEDURE — 97607 NEG PRS WND THR NDME<=50SQCM: CPT | Performed by: STUDENT IN AN ORGANIZED HEALTH CARE EDUCATION/TRAINING PROGRAM

## 2024-06-17 PROCEDURE — C1776 JOINT DEVICE (IMPLANTABLE): HCPCS | Performed by: STUDENT IN AN ORGANIZED HEALTH CARE EDUCATION/TRAINING PROGRAM

## 2024-06-17 PROCEDURE — 7100000002 HC RECOVERY ROOM TIME - EACH INCREMENTAL 1 MINUTE: Performed by: STUDENT IN AN ORGANIZED HEALTH CARE EDUCATION/TRAINING PROGRAM

## 2024-06-17 PROCEDURE — RXMED WILLOW AMBULATORY MEDICATION CHARGE

## 2024-06-17 PROCEDURE — 2500000001 HC RX 250 WO HCPCS SELF ADMINISTERED DRUGS (ALT 637 FOR MEDICARE OP): Performed by: ANESTHESIOLOGY

## 2024-06-17 PROCEDURE — 27447 TOTAL KNEE ARTHROPLASTY: CPT | Performed by: STUDENT IN AN ORGANIZED HEALTH CARE EDUCATION/TRAINING PROGRAM

## 2024-06-17 PROCEDURE — 2500000004 HC RX 250 GENERAL PHARMACY W/ HCPCS (ALT 636 FOR OP/ED): Performed by: NURSE ANESTHETIST, CERTIFIED REGISTERED

## 2024-06-17 PROCEDURE — 3600000018 HC OR TIME - INITIAL BASE CHARGE - PROCEDURE LEVEL SIX: Performed by: STUDENT IN AN ORGANIZED HEALTH CARE EDUCATION/TRAINING PROGRAM

## 2024-06-17 PROCEDURE — 2500000004 HC RX 250 GENERAL PHARMACY W/ HCPCS (ALT 636 FOR OP/ED): Performed by: ANESTHESIOLOGY

## 2024-06-17 PROCEDURE — 2500000005 HC RX 250 GENERAL PHARMACY W/O HCPCS: Performed by: ANESTHESIOLOGY

## 2024-06-17 PROCEDURE — G0378 HOSPITAL OBSERVATION PER HR: HCPCS

## 2024-06-17 PROCEDURE — 9420000001 HC RT PATIENT EDUCATION 5 MIN

## 2024-06-17 PROCEDURE — 2720000007 HC OR 272 NO HCPCS: Performed by: STUDENT IN AN ORGANIZED HEALTH CARE EDUCATION/TRAINING PROGRAM

## 2024-06-17 PROCEDURE — A4217 STERILE WATER/SALINE, 500 ML: HCPCS | Performed by: STUDENT IN AN ORGANIZED HEALTH CARE EDUCATION/TRAINING PROGRAM

## 2024-06-17 PROCEDURE — 27447 TOTAL KNEE ARTHROPLASTY: CPT

## 2024-06-17 PROCEDURE — C1713 ANCHOR/SCREW BN/BN,TIS/BN: HCPCS | Performed by: STUDENT IN AN ORGANIZED HEALTH CARE EDUCATION/TRAINING PROGRAM

## 2024-06-17 PROCEDURE — 2500000004 HC RX 250 GENERAL PHARMACY W/ HCPCS (ALT 636 FOR OP/ED)

## 2024-06-17 PROCEDURE — 2500000004 HC RX 250 GENERAL PHARMACY W/ HCPCS (ALT 636 FOR OP/ED): Performed by: STUDENT IN AN ORGANIZED HEALTH CARE EDUCATION/TRAINING PROGRAM

## 2024-06-17 PROCEDURE — 7100000001 HC RECOVERY ROOM TIME - INITIAL BASE CHARGE: Performed by: STUDENT IN AN ORGANIZED HEALTH CARE EDUCATION/TRAINING PROGRAM

## 2024-06-17 PROCEDURE — 73560 X-RAY EXAM OF KNEE 1 OR 2: CPT | Mod: LEFT SIDE | Performed by: STUDENT IN AN ORGANIZED HEALTH CARE EDUCATION/TRAINING PROGRAM

## 2024-06-17 PROCEDURE — 2500000002 HC RX 250 W HCPCS SELF ADMINISTERED DRUGS (ALT 637 FOR MEDICARE OP, ALT 636 FOR OP/ED)

## 2024-06-17 PROCEDURE — 7100000011 HC EXTENDED STAY RECOVERY HOURLY - NURSING UNIT

## 2024-06-17 PROCEDURE — 2500000001 HC RX 250 WO HCPCS SELF ADMINISTERED DRUGS (ALT 637 FOR MEDICARE OP): Performed by: STUDENT IN AN ORGANIZED HEALTH CARE EDUCATION/TRAINING PROGRAM

## 2024-06-17 PROCEDURE — 2500000005 HC RX 250 GENERAL PHARMACY W/O HCPCS: Performed by: STUDENT IN AN ORGANIZED HEALTH CARE EDUCATION/TRAINING PROGRAM

## 2024-06-17 PROCEDURE — 73560 X-RAY EXAM OF KNEE 1 OR 2: CPT | Mod: LT

## 2024-06-17 PROCEDURE — 3700000002 HC GENERAL ANESTHESIA TIME - EACH INCREMENTAL 1 MINUTE: Performed by: STUDENT IN AN ORGANIZED HEALTH CARE EDUCATION/TRAINING PROGRAM

## 2024-06-17 PROCEDURE — 3700000001 HC GENERAL ANESTHESIA TIME - INITIAL BASE CHARGE: Performed by: STUDENT IN AN ORGANIZED HEALTH CARE EDUCATION/TRAINING PROGRAM

## 2024-06-17 DEVICE — IMPLANTABLE DEVICE: Type: IMPLANTABLE DEVICE | Site: KNEE | Status: FUNCTIONAL

## 2024-06-17 DEVICE — TOBRA FULL DOSE ANTIBIOTIC BONE CEMENT, 10 PACK CATALOG NUMBER IS 6197-9-010
Type: IMPLANTABLE DEVICE | Site: KNEE | Status: FUNCTIONAL
Brand: SIMPLEX

## 2024-06-17 DEVICE — ATTUNE PATELLA MEDIALIZED DOME 38MM CEMENTED AOX
Type: IMPLANTABLE DEVICE | Site: KNEE | Status: FUNCTIONAL
Brand: ATTUNE

## 2024-06-17 DEVICE — ATTUNE KNEE SYSTEM FEMORAL CRUCIATE RETAINING SIZE 8 LEFT CEMENTED
Type: IMPLANTABLE DEVICE | Site: KNEE | Status: FUNCTIONAL
Brand: ATTUNE

## 2024-06-17 DEVICE — ATTUNE KNEE SYSTEM TIBIAL BASE FIXED BEARING SIZE 7 CEMENTED
Type: IMPLANTABLE DEVICE | Site: KNEE | Status: FUNCTIONAL
Brand: ATTUNE

## 2024-06-17 RX ORDER — SODIUM CHLORIDE, SODIUM LACTATE, POTASSIUM CHLORIDE, CALCIUM CHLORIDE 600; 310; 30; 20 MG/100ML; MG/100ML; MG/100ML; MG/100ML
100 INJECTION, SOLUTION INTRAVENOUS CONTINUOUS
Status: DISCONTINUED | OUTPATIENT
Start: 2024-06-17 | End: 2024-06-18 | Stop reason: HOSPADM

## 2024-06-17 RX ORDER — HYDRALAZINE HYDROCHLORIDE 20 MG/ML
5 INJECTION INTRAMUSCULAR; INTRAVENOUS EVERY 30 MIN PRN
Status: DISCONTINUED | OUTPATIENT
Start: 2024-06-17 | End: 2024-06-17 | Stop reason: HOSPADM

## 2024-06-17 RX ORDER — HYDROMORPHONE HYDROCHLORIDE 0.2 MG/ML
0.2 INJECTION INTRAMUSCULAR; INTRAVENOUS; SUBCUTANEOUS EVERY 4 HOURS PRN
Status: DISCONTINUED | OUTPATIENT
Start: 2024-06-17 | End: 2024-06-18 | Stop reason: HOSPADM

## 2024-06-17 RX ORDER — ROPIVACAINE/EPI/CLONIDINE/KET 2.46-0.005
SYRINGE (ML) INJECTION AS NEEDED
Status: DISCONTINUED | OUTPATIENT
Start: 2024-06-17 | End: 2024-06-17 | Stop reason: HOSPADM

## 2024-06-17 RX ORDER — OXYCODONE HYDROCHLORIDE 5 MG/1
10 TABLET ORAL EVERY 6 HOURS PRN
Status: DISCONTINUED | OUTPATIENT
Start: 2024-06-17 | End: 2024-06-18 | Stop reason: HOSPADM

## 2024-06-17 RX ORDER — SODIUM CHLORIDE 0.9 G/100ML
IRRIGANT IRRIGATION AS NEEDED
Status: DISCONTINUED | OUTPATIENT
Start: 2024-06-17 | End: 2024-06-17 | Stop reason: HOSPADM

## 2024-06-17 RX ORDER — ALBUTEROL SULFATE 0.83 MG/ML
2.5 SOLUTION RESPIRATORY (INHALATION) ONCE AS NEEDED
Status: DISCONTINUED | OUTPATIENT
Start: 2024-06-17 | End: 2024-06-17 | Stop reason: HOSPADM

## 2024-06-17 RX ORDER — DEXTROSE 50 % IN WATER (D50W) INTRAVENOUS SYRINGE
12.5
Status: DISCONTINUED | OUTPATIENT
Start: 2024-06-17 | End: 2024-06-18 | Stop reason: HOSPADM

## 2024-06-17 RX ORDER — DEXTROSE 50 % IN WATER (D50W) INTRAVENOUS SYRINGE
25
Status: DISCONTINUED | OUTPATIENT
Start: 2024-06-17 | End: 2024-06-18 | Stop reason: HOSPADM

## 2024-06-17 RX ORDER — ACETAMINOPHEN 325 MG/1
975 TABLET ORAL EVERY 8 HOURS
Status: DISCONTINUED | OUTPATIENT
Start: 2024-06-17 | End: 2024-06-18 | Stop reason: HOSPADM

## 2024-06-17 RX ORDER — POLYETHYLENE GLYCOL 3350 17 G/17G
17 POWDER, FOR SOLUTION ORAL DAILY
Status: DISCONTINUED | OUTPATIENT
Start: 2024-06-17 | End: 2024-06-18 | Stop reason: HOSPADM

## 2024-06-17 RX ORDER — MIDAZOLAM HYDROCHLORIDE 1 MG/ML
INJECTION, SOLUTION INTRAMUSCULAR; INTRAVENOUS AS NEEDED
Status: DISCONTINUED | OUTPATIENT
Start: 2024-06-17 | End: 2024-06-17

## 2024-06-17 RX ORDER — BUPIVACAINE HCL/EPINEPHRINE 0.5-1:200K
VIAL (ML) INJECTION AS NEEDED
Status: DISCONTINUED | OUTPATIENT
Start: 2024-06-17 | End: 2024-06-17

## 2024-06-17 RX ORDER — LIDOCAINE HYDROCHLORIDE 10 MG/ML
INJECTION INFILTRATION; PERINEURAL
Status: DISPENSED
Start: 2024-06-17 | End: 2024-06-18

## 2024-06-17 RX ORDER — ONDANSETRON HYDROCHLORIDE 2 MG/ML
INJECTION, SOLUTION INTRAVENOUS AS NEEDED
Status: DISCONTINUED | OUTPATIENT
Start: 2024-06-17 | End: 2024-06-17

## 2024-06-17 RX ORDER — FENTANYL CITRATE 50 UG/ML
INJECTION, SOLUTION INTRAMUSCULAR; INTRAVENOUS AS NEEDED
Status: DISCONTINUED | OUTPATIENT
Start: 2024-06-17 | End: 2024-06-17

## 2024-06-17 RX ORDER — SODIUM CHLORIDE, SODIUM LACTATE, POTASSIUM CHLORIDE, CALCIUM CHLORIDE 600; 310; 30; 20 MG/100ML; MG/100ML; MG/100ML; MG/100ML
100 INJECTION, SOLUTION INTRAVENOUS CONTINUOUS
Status: DISCONTINUED | OUTPATIENT
Start: 2024-06-17 | End: 2024-06-17 | Stop reason: HOSPADM

## 2024-06-17 RX ORDER — BUPIVACAINE HYDROCHLORIDE 7.5 MG/ML
INJECTION, SOLUTION INTRASPINAL AS NEEDED
Status: DISCONTINUED | OUTPATIENT
Start: 2024-06-17 | End: 2024-06-17

## 2024-06-17 RX ORDER — ONDANSETRON HYDROCHLORIDE 2 MG/ML
4 INJECTION, SOLUTION INTRAVENOUS ONCE AS NEEDED
Status: DISCONTINUED | OUTPATIENT
Start: 2024-06-17 | End: 2024-06-17 | Stop reason: HOSPADM

## 2024-06-17 RX ORDER — DOCUSATE SODIUM 100 MG/1
100 CAPSULE, LIQUID FILLED ORAL 2 TIMES DAILY
Status: DISCONTINUED | OUTPATIENT
Start: 2024-06-17 | End: 2024-06-18 | Stop reason: HOSPADM

## 2024-06-17 RX ORDER — PANTOPRAZOLE SODIUM 40 MG/1
40 TABLET, DELAYED RELEASE ORAL
Status: DISCONTINUED | OUTPATIENT
Start: 2024-06-18 | End: 2024-06-18 | Stop reason: HOSPADM

## 2024-06-17 RX ORDER — OXYCODONE HYDROCHLORIDE 5 MG/1
5 TABLET ORAL EVERY 4 HOURS PRN
Status: DISCONTINUED | OUTPATIENT
Start: 2024-06-17 | End: 2024-06-17 | Stop reason: HOSPADM

## 2024-06-17 RX ORDER — TRANEXAMIC ACID 650 MG/1
1950 TABLET ORAL ONCE
Status: COMPLETED | OUTPATIENT
Start: 2024-06-17 | End: 2024-06-17

## 2024-06-17 RX ORDER — ASPIRIN 81 MG/1
81 TABLET ORAL 2 TIMES DAILY
Status: DISCONTINUED | OUTPATIENT
Start: 2024-06-17 | End: 2024-06-18 | Stop reason: HOSPADM

## 2024-06-17 RX ORDER — ACETAMINOPHEN 325 MG/1
975 TABLET ORAL ONCE
Status: COMPLETED | OUTPATIENT
Start: 2024-06-17 | End: 2024-06-17

## 2024-06-17 RX ORDER — OXYCODONE HYDROCHLORIDE 5 MG/1
5 TABLET ORAL EVERY 6 HOURS PRN
Status: DISCONTINUED | OUTPATIENT
Start: 2024-06-17 | End: 2024-06-18 | Stop reason: HOSPADM

## 2024-06-17 RX ORDER — DIPHENHYDRAMINE HYDROCHLORIDE 50 MG/ML
12.5 INJECTION INTRAMUSCULAR; INTRAVENOUS ONCE AS NEEDED
Status: DISCONTINUED | OUTPATIENT
Start: 2024-06-17 | End: 2024-06-17 | Stop reason: HOSPADM

## 2024-06-17 RX ORDER — MIDAZOLAM HYDROCHLORIDE 1 MG/ML
INJECTION INTRAMUSCULAR; INTRAVENOUS
Status: DISPENSED
Start: 2024-06-17 | End: 2024-06-18

## 2024-06-17 RX ORDER — CEFAZOLIN 1 G/1
INJECTION, POWDER, FOR SOLUTION INTRAVENOUS AS NEEDED
Status: DISCONTINUED | OUTPATIENT
Start: 2024-06-17 | End: 2024-06-17

## 2024-06-17 RX ORDER — FLUTICASONE PROPIONATE 50 MCG
1 SPRAY, SUSPENSION (ML) NASAL 2 TIMES DAILY PRN
Status: DISCONTINUED | OUTPATIENT
Start: 2024-06-17 | End: 2024-06-18 | Stop reason: HOSPADM

## 2024-06-17 RX ORDER — BUPIVACAINE HCL/EPINEPHRINE 0.5-1:200K
VIAL (ML) INJECTION
Status: COMPLETED
Start: 2024-06-17 | End: 2024-06-17

## 2024-06-17 RX ORDER — CELECOXIB 200 MG/1
200 CAPSULE ORAL ONCE
Status: COMPLETED | OUTPATIENT
Start: 2024-06-17 | End: 2024-06-17

## 2024-06-17 RX ORDER — ROSUVASTATIN CALCIUM 20 MG/1
40 TABLET, COATED ORAL NIGHTLY
Status: DISCONTINUED | OUTPATIENT
Start: 2024-06-17 | End: 2024-06-18 | Stop reason: HOSPADM

## 2024-06-17 RX ORDER — KETOROLAC TROMETHAMINE 30 MG/ML
15 INJECTION, SOLUTION INTRAMUSCULAR; INTRAVENOUS EVERY 6 HOURS
Status: COMPLETED | OUTPATIENT
Start: 2024-06-17 | End: 2024-06-18

## 2024-06-17 RX ORDER — PROPOFOL 10 MG/ML
INJECTION, EMULSION INTRAVENOUS AS NEEDED
Status: DISCONTINUED | OUTPATIENT
Start: 2024-06-17 | End: 2024-06-17

## 2024-06-17 RX ORDER — FLUTICASONE FUROATE AND VILANTEROL 200; 25 UG/1; UG/1
1 POWDER RESPIRATORY (INHALATION)
Status: DISCONTINUED | OUTPATIENT
Start: 2024-06-17 | End: 2024-06-17

## 2024-06-17 RX ORDER — INSULIN LISPRO 100 [IU]/ML
0-15 INJECTION, SOLUTION INTRAVENOUS; SUBCUTANEOUS
Status: DISCONTINUED | OUTPATIENT
Start: 2024-06-17 | End: 2024-06-18 | Stop reason: HOSPADM

## 2024-06-17 RX ORDER — PROPOFOL 10 MG/ML
INJECTION, EMULSION INTRAVENOUS CONTINUOUS PRN
Status: DISCONTINUED | OUTPATIENT
Start: 2024-06-17 | End: 2024-06-17

## 2024-06-17 RX ORDER — CARVEDILOL 12.5 MG/1
12.5 TABLET ORAL
Status: DISCONTINUED | OUTPATIENT
Start: 2024-06-17 | End: 2024-06-18 | Stop reason: HOSPADM

## 2024-06-17 RX ORDER — ACETAMINOPHEN 325 MG/1
650 TABLET ORAL ONCE
Status: DISCONTINUED | OUTPATIENT
Start: 2024-06-17 | End: 2024-06-17

## 2024-06-17 RX ORDER — FOLIC ACID 1 MG/1
1 TABLET ORAL DAILY
Status: DISCONTINUED | OUTPATIENT
Start: 2024-06-17 | End: 2024-06-18 | Stop reason: HOSPADM

## 2024-06-17 RX ORDER — ALLOPURINOL 300 MG/1
300 TABLET ORAL DAILY
Status: DISCONTINUED | OUTPATIENT
Start: 2024-06-17 | End: 2024-06-18 | Stop reason: HOSPADM

## 2024-06-17 RX ORDER — KETOROLAC TROMETHAMINE 30 MG/ML
INJECTION, SOLUTION INTRAMUSCULAR; INTRAVENOUS AS NEEDED
Status: DISCONTINUED | OUTPATIENT
Start: 2024-06-17 | End: 2024-06-17

## 2024-06-17 RX ORDER — ONDANSETRON HYDROCHLORIDE 2 MG/ML
4 INJECTION, SOLUTION INTRAVENOUS EVERY 8 HOURS PRN
Status: DISCONTINUED | OUTPATIENT
Start: 2024-06-17 | End: 2024-06-18 | Stop reason: HOSPADM

## 2024-06-17 RX ORDER — AMLODIPINE BESYLATE 10 MG/1
10 TABLET ORAL DAILY
Status: DISCONTINUED | OUTPATIENT
Start: 2024-06-17 | End: 2024-06-18 | Stop reason: HOSPADM

## 2024-06-17 RX ORDER — NALOXONE HYDROCHLORIDE 0.4 MG/ML
0.2 INJECTION, SOLUTION INTRAMUSCULAR; INTRAVENOUS; SUBCUTANEOUS EVERY 5 MIN PRN
Status: DISCONTINUED | OUTPATIENT
Start: 2024-06-17 | End: 2024-06-18 | Stop reason: HOSPADM

## 2024-06-17 RX ORDER — ONDANSETRON 4 MG/1
4 TABLET, ORALLY DISINTEGRATING ORAL EVERY 8 HOURS PRN
Status: DISCONTINUED | OUTPATIENT
Start: 2024-06-17 | End: 2024-06-18 | Stop reason: HOSPADM

## 2024-06-17 SDOH — SOCIAL STABILITY: SOCIAL INSECURITY: HAVE YOU HAD THOUGHTS OF HARMING ANYONE ELSE?: NO

## 2024-06-17 SDOH — SOCIAL STABILITY: SOCIAL INSECURITY: ABUSE: ADULT

## 2024-06-17 SDOH — SOCIAL STABILITY: SOCIAL INSECURITY: ARE YOU OR HAVE YOU BEEN THREATENED OR ABUSED PHYSICALLY, EMOTIONALLY, OR SEXUALLY BY ANYONE?: NO

## 2024-06-17 SDOH — SOCIAL STABILITY: SOCIAL INSECURITY: DOES ANYONE TRY TO KEEP YOU FROM HAVING/CONTACTING OTHER FRIENDS OR DOING THINGS OUTSIDE YOUR HOME?: NO

## 2024-06-17 SDOH — SOCIAL STABILITY: SOCIAL INSECURITY: ARE THERE ANY APPARENT SIGNS OF INJURIES/BEHAVIORS THAT COULD BE RELATED TO ABUSE/NEGLECT?: NO

## 2024-06-17 SDOH — SOCIAL STABILITY: SOCIAL INSECURITY: HAVE YOU HAD ANY THOUGHTS OF HARMING ANYONE ELSE?: NO

## 2024-06-17 SDOH — SOCIAL STABILITY: SOCIAL INSECURITY: DO YOU FEEL ANYONE HAS EXPLOITED OR TAKEN ADVANTAGE OF YOU FINANCIALLY OR OF YOUR PERSONAL PROPERTY?: NO

## 2024-06-17 SDOH — HEALTH STABILITY: MENTAL HEALTH: CURRENT SMOKER: 1

## 2024-06-17 SDOH — SOCIAL STABILITY: SOCIAL INSECURITY: DO YOU FEEL UNSAFE GOING BACK TO THE PLACE WHERE YOU ARE LIVING?: NO

## 2024-06-17 SDOH — SOCIAL STABILITY: SOCIAL INSECURITY: WERE YOU ABLE TO COMPLETE ALL THE BEHAVIORAL HEALTH SCREENINGS?: YES

## 2024-06-17 SDOH — SOCIAL STABILITY: SOCIAL INSECURITY: HAS ANYONE EVER THREATENED TO HURT YOUR FAMILY OR YOUR PETS?: NO

## 2024-06-17 ASSESSMENT — ACTIVITIES OF DAILY LIVING (ADL)
BATHING: NEEDS ASSISTANCE
WALKS IN HOME: NEEDS ASSISTANCE
JUDGMENT_ADEQUATE_SAFELY_COMPLETE_DAILY_ACTIVITIES: YES
GROOMING: INDEPENDENT
HEARING - LEFT EAR: FUNCTIONAL
ADEQUATE_TO_COMPLETE_ADL: YES
FEEDING YOURSELF: INDEPENDENT
ASSISTIVE_DEVICE: WALKER
PATIENT'S MEMORY ADEQUATE TO SAFELY COMPLETE DAILY ACTIVITIES?: YES
LACK_OF_TRANSPORTATION: NO
HEARING - RIGHT EAR: FUNCTIONAL
TOILETING: NEEDS ASSISTANCE
DRESSING YOURSELF: INDEPENDENT

## 2024-06-17 ASSESSMENT — LIFESTYLE VARIABLES
HOW OFTEN DO YOU HAVE A DRINK CONTAINING ALCOHOL: NEVER
SKIP TO QUESTIONS 9-10: 1
HOW OFTEN DO YOU HAVE 6 OR MORE DRINKS ON ONE OCCASION: NEVER
AUDIT-C TOTAL SCORE: 0
AUDIT-C TOTAL SCORE: 0
HOW MANY STANDARD DRINKS CONTAINING ALCOHOL DO YOU HAVE ON A TYPICAL DAY: PATIENT DOES NOT DRINK

## 2024-06-17 ASSESSMENT — COGNITIVE AND FUNCTIONAL STATUS - GENERAL
DRESSING REGULAR LOWER BODY CLOTHING: A LITTLE
CLIMB 3 TO 5 STEPS WITH RAILING: A LITTLE
DAILY ACTIVITIY SCORE: 23
TURNING FROM BACK TO SIDE WHILE IN FLAT BAD: A LITTLE
WALKING IN HOSPITAL ROOM: A LITTLE
MOBILITY SCORE: 18
PATIENT BASELINE BEDBOUND: NO
MOVING TO AND FROM BED TO CHAIR: A LITTLE
MOVING FROM LYING ON BACK TO SITTING ON SIDE OF FLAT BED WITH BEDRAILS: A LITTLE
STANDING UP FROM CHAIR USING ARMS: A LITTLE

## 2024-06-17 ASSESSMENT — PATIENT HEALTH QUESTIONNAIRE - PHQ9
1. LITTLE INTEREST OR PLEASURE IN DOING THINGS: NOT AT ALL
SUM OF ALL RESPONSES TO PHQ9 QUESTIONS 1 & 2: 0
2. FEELING DOWN, DEPRESSED OR HOPELESS: NOT AT ALL

## 2024-06-17 ASSESSMENT — PAIN DESCRIPTION - ORIENTATION
ORIENTATION: LEFT
ORIENTATION: LEFT

## 2024-06-17 ASSESSMENT — PAIN SCALES - GENERAL
PAINLEVEL_OUTOF10: 0 - NO PAIN
PAINLEVEL_OUTOF10: 2
PAINLEVEL_OUTOF10: 7
PAINLEVEL_OUTOF10: 3
PAINLEVEL_OUTOF10: 2
PAINLEVEL_OUTOF10: 8
PAINLEVEL_OUTOF10: 3

## 2024-06-17 ASSESSMENT — PAIN DESCRIPTION - DESCRIPTORS: DESCRIPTORS: SORE

## 2024-06-17 ASSESSMENT — PAIN DESCRIPTION - LOCATION
LOCATION: KNEE
LOCATION: KNEE

## 2024-06-17 ASSESSMENT — PAIN - FUNCTIONAL ASSESSMENT
PAIN_FUNCTIONAL_ASSESSMENT: 0-10

## 2024-06-17 ASSESSMENT — COLUMBIA-SUICIDE SEVERITY RATING SCALE - C-SSRS
1. IN THE PAST MONTH, HAVE YOU WISHED YOU WERE DEAD OR WISHED YOU COULD GO TO SLEEP AND NOT WAKE UP?: NO
2. HAVE YOU ACTUALLY HAD ANY THOUGHTS OF KILLING YOURSELF?: NO
6. HAVE YOU EVER DONE ANYTHING, STARTED TO DO ANYTHING, OR PREPARED TO DO ANYTHING TO END YOUR LIFE?: NO

## 2024-06-17 ASSESSMENT — ENCOUNTER SYMPTOMS
LOSS OF SENSATION IN FEET: 0
DEPRESSION: 0
OCCASIONAL FEELINGS OF UNSTEADINESS: 0

## 2024-06-17 NOTE — CARE PLAN
The patient's goals for the shift include      The clinical goals for the shift include      Over the shift, the patient did not make progress toward the following goals. Barriers to progression include   Problem: Pain  Goal: Takes deep breaths with improved pain control throughout the shift  Outcome: Progressing  Goal: Turns in bed with improved pain control throughout the shift  Outcome: Progressing  Goal: Walks with improved pain control throughout the shift  Outcome: Progressing  Goal: Performs ADL's with improved pain control throughout shift  Outcome: Progressing  Goal: Participates in PT with improved pain control throughout the shift  Outcome: Progressing  Goal: Free from opioid side effects throughout the shift  Outcome: Progressing  Goal: Free from acute confusion related to pain meds throughout the shift  Outcome: Progressing   . Recommendations to address these barriers include .

## 2024-06-17 NOTE — ANESTHESIA POSTPROCEDURE EVALUATION
Patient: Hammad Blackburn    Procedure Summary       Date: 06/17/24 Room / Location: [facility] / Virtual [facility]    Anesthesia Start: 1307 Anesthesia Stop: 1612    Procedure: Left Knee Total Arthroplasty (Left: Knee) Diagnosis:       Arthritis of left knee      (Arthritis of left knee [M17.12])    Surgeons: Jung Plunkett MD Responsible Provider: TATYANA Rashid    Anesthesia Type: regional, spinal ASA Status: 3            Anesthesia Type: regional, spinal    Vitals Value Taken Time   /81 06/17/24 1617   Temp 36 °C (96.8 °F) 06/17/24 1607   Pulse 53 06/17/24 1622   Resp 17 06/17/24 1607   SpO2 96 % 06/17/24 1622   Vitals shown include unfiled device data.    Anesthesia Post Evaluation    Patient location during evaluation: bedside  Patient participation: complete - patient participated  Level of consciousness: awake  Pain management: adequate  Airway patency: patent  Cardiovascular status: acceptable  Respiratory status: acceptable  Hydration status: acceptable  Postoperative Nausea and Vomiting: none        No notable events documented.

## 2024-06-17 NOTE — ANESTHESIA PROCEDURE NOTES
Spinal Block    Start time: 6/17/2024 1:10 PM  End time: 6/17/2024 1:16 PM  Reason for block: primary anesthetic  Staffing  Performed: attending   Authorized by: Camilo Orellana MD    Performed by: Camilo Orellana MD    Preanesthetic Checklist  Completed: patient identified, IV checked, site marked, risks and benefits discussed, surgical consent, monitors and equipment checked, pre-op evaluation, timeout performed and sterile techniques followed  Block Timeout  RN/Licensed healthcare professional reads aloud to the Anesthesia provider and entire team: Patient identity, procedure with side and site, patient position, and as applicable the availability of implants/special equipment/special requirements.  Patient on coagulant treatment: no  Timeout performed at: 6/17/2024 1:10 PM  Spinal Block  Patient position: sitting  Prep: DuraPrep  Sterility prep: cap, mask and drape  Sedation level: moderate sedation  Patient monitoring: blood pressure, continuous pulse oximetry, EKG and heart rate  Approach: midline  Vertebral space: L3-4  Injection technique: single-shot  Needle  Needle type: pencil-point   Needle gauge: 22 G  Needle length: 3.5 in      Assessment  Sensory level: T10  Procedure assessment: patient sedated but conversant throughout procedure and patient tolerated procedure well with no immediate complications  Additional Notes  Spinal performed in sitting position. 22g Sprotte. L3-4. Total of 2cc .75% marcaine. Well-tolerated.

## 2024-06-17 NOTE — OP NOTE
Left Knee Total Arthroplasty (L) Operative Note     Date: 2024  OR Location: [facility] A OR    Name: Hammad Blackburn, : 1960, Age: 63 y.o., MRN: [id number], Sex: male    Diagnosis  Pre-op Diagnosis     * Arthritis of left knee [M17.12] Post-op Diagnosis     * Arthritis of left knee [M17.12]     Procedures  Left Knee Total Arthroplasty  84594 - CA ARTHRP KNE CONDYLE&PLATU MEDIAL&LAT COMPARTMENTS    CA ARTHRP KNE CONDYLE&PLATU MEDIAL&LAT COMPARTMENTS [[id number]]  Surgeons      * Jung Plunkett - Primary    Resident/Fellow/Other Assistant:  Surgeons and Role:     * Rubens Calhoun MD - Assisting    Procedure Summary  Anesthesia: General  ASA: III  Anesthesia Staff: Anesthesiologist: Alfonso Ames MD; Camilo Orellana MD  CRNA: ENRICO Rashid-CRNA  C-AA: KATHY Lee  Estimated Blood Loss: 25mL  Intra-op Medications:   Administrations occurring from 1230 to 1530 on 24:   Medication Name Total Dose   ropivacaine-epinephrine-clonidine-ketorolac 2.46-0.005- 0.0008-0.3mg/mL periarticular syringe 50 mL   sodium chloride 0.9 % irrigation solution 4,000 mL   BUPivacaine-EPINEPHrine (Marcaine w/EPI) injection  - Omnicell Override Pull Cannot be calculated              Anesthesia Record               Intraprocedure I/O Totals          Intake    Dexmedetomidine 0.00 mL    The total shown is the total volume documented since Anesthesia Start was filed.    Propofol Drip 0.00 mL    The total shown is the total volume documented since Anesthesia Start was filed.    Total Intake 0 mL          Specimen: No specimens collected     Staff:   Circulator: Kumar  Scrub Person: Rashmi Armas Circulator: Adri Armas Scrub: Ramirez         Drains and/or Catheters: * None in log *    Tourniquet Times:     Total Tourniquet Time Documented:  Thigh (Left) - 130 minutes  Total: Thigh (Left) - 130 minutes      Implants:  Implants       Type Name Action Serial No.      Implant CEMENT, BONE, TOBRAMYCIN, FULL DOSE - SN/A -  [id number] Implanted N/A     Joint Knee DOME, PATELLA, MEDIALIZED, 38MM - SN/A - [id number] Implanted N/A     Joint Knee FEMORAL, ATTUNE CR, PACO, SZ 8, LT - SN/A - [id number] Implanted N/A     Joint Knee TIBAL BASE ATTUNE FB, SZ 7 PACO - SN/A - [id number] Implanted N/A      5.0MM ATTUNE CRUCIATE RETAINING FIXED BEARING TIBIAL INSERT, AOX, SIZE 8, LEFT, MEDIAL STABILIZED Implanted               Findings: DJD right knee    Implants:   Depuy Attune CR Femur Size 8  Depuy Attune Fixed Bearing Tibia Size 7  Depuy Polyethylene Patella Size 38  Polyethylene insert Size 8, 5 mm Thickness       Anesthesia: Spinal      Tourniquet Time: 120 min       Medications: Ancef and TXA       Position: Supine       Blood loss: 25 cc       Complications: None       Specimen: None       INDICATIONS:   The patient is a 63 y.o. year old male with a longstanding history of knee pain secondary to end stage osteoarthritis. The patient failed non-operative treatment to include anti-inflammatories, rest, and activity modification. Radiographs showed bone-on-bone osteoarthritis.  After discussing the options, risks, benefits, and possible complications, the patient elected to undergo a total knee arthroplasty. The patient understood the risks including but are not limited to infection, fracture, loosening of the components, failure of the implants, loss of range of motion, stiffness, chronic pain, opioid addiction, disability, wear, loosening, reaction to implanted materials, DVT, PE, MI, stroke, loss of limb, loss of life, or potential need for further surgery. The patient understands these risks and has elected to proceed.       TECHNIQUE:   The patient was properly identified in the holding area using name, medical record number, and date of birth. Informed consent was then signed and the operative extremity was marked. The patient had an opportunity to ask questions which were answered. The procedure, risks, benefits and alternatives were  reviewed. Next, the patient was brought back to the operating room. Spinal anesthesia was initiated without difficulty. The patient was placed in the supine position on the table. All bonnie prominences were well padded. A sequential compression device was applied to the contralateral extremity. The operative lower extremity was prepped and draped in usual sterile fashion. The patient received ancef and TXA prior to incision.   A preprocedural timeout was then performed once again confirming the patient's identity, procedure, and laterality. In addition, allergy status and required equipment were reviewed. Three independent members of the operating room team agreed on the above-mentioned parameters. The pre-operative marking was still visible.       The leg was then exsanguinated, tourniquet inflated, incision made in the midline.  Soft tissue dissected down to the retinaculum was performed. A medial parapatellar arthrotomy was performed.  Partial medial synovectomy was performed. A medial release was performed.  Fat pad was resected laterally. The anterior medial and lateral meniscus were released. ACL was transected.       Using the step drill, the femoral canal was opened.  We then used bulb irrigation to irrigate the intramedullary canal.  The intramedullary mehreen was introduced into the femur.  The distal femoral cut angle was set at 5 degrees. Distal resection block was pinned in place.  Collateral retractors were placed and the distal femur was resected.       Attention was then turned to the patella.  The patella measured 26 mm centrally. It was then cut to 15 mm. The patella was checked to ensure it was symmetric. The patella was then sized and prepared using the guide. A size 38 mm was found to be appropriate.  I placed a patella protector and flexed up the knee.       We then turned our attention to the proximal tibia. Medial, lateral and posterior retractors were placed. The extramedullary guide was set for  7 degrees of posterior slope and neutral varus/valgus angulation.  The block was pinned in place and the proximal tibia was resected.  The cut was verified to ensure that it was flat and not in varus or valgus.       The knee was brought into extension and a medial and lateral meniscectomy were performed.       We then turned our attention back to the femur.  The sizing guide was introduced and pinned in place.  The femur was sized to a size 8.  Appropriate pins were placed.  The sizing block was then removed and the 4-in-1 cutting block was introduced and pinned in place.  The anterior, posterior and chamfer cuts were then made.  The cutting block was removed.       Attention was then turned to the posterior osteophytes. Using a curved 3/4 inch osteotome, the osteophytes were removed. We then used a cob to elevate the posterior capsule from the femur.       The tibia was subluxed and a trial base plate was used to size the tibia. A size 7 was appropriate. This was secured in place with provisional pins. The femoral trail was then impacted. A trial polyethylene was inserted. The PCL was partially recessed.    The knee was able to be flexed to 120 degrees. The knee was stable to both varus and valgus stress through full range of motion, including mid flexion. The knee was checked again and found to be symmetric. The patella tracked well. The trial polyethylene was removed, and the femoral lug holes were drilled. The final tibial preparation was performed. All trials were removed. The knee was copiously irrigated and dried.       The cement was mixed and the final implants were cemented into place starting with the tibia. Excess cement was removed. A trial polyethylene was placed and the knee was held in full extension to allow the cement to harden.     Finally, the trial polyethylene was replaced with the definitive insert. A 5 mm thickness was appropriate. The knee was again brought through a full range of motion.  It was stable to both varus and valgus stress through a full range of motion. The patella tracked centrally. The joint mix was injected into the local soft tissues and the wound was irrigated with copious pulse lavage.       The knee was again copiously irrigated. The retinaculum was approximated with #2 ethibond and then closed with #1 Stratafix. Deep subcutaneous and dermis closed with 0 and 2-0 Vicryl in simple interrupted fashion. Skin closure with Monocryl and skin glue.  An incisional wound VAC was utilized for this procedure.  I believe that it was medically necessary given the patient's obesity, recent smoking history and diabetic history.      The patient tolerated the procedure well with no complications and was taken from the operating room in stable condition. All sponge and needle counts were correct at the end of the case.       I attest that I was present and scrubbed for all critical parts of the procedure.     Post Operative Plan:  Weight bearing as tolerated  Aspirin 81 mg BID for 4 weeks for VTE prophylaxis  Post operative antibiotics in PACU  Disposition: Overnight observation     The patient's surgery was assisted by Crystal GRIFFIN, due to lack of availability of a qualified resident to assist with the surgery. Crystal GRIFFIN was present for the essential parts of the procedure. She acted as first assistant and assisted with preparing the leg, draping the leg, exposing the knee, retracting and manipulating the leg during surgery, facilitating safe performance of the procedure, and closure of the wound.     Complications:  None; patient tolerated the procedure well.    Disposition: PACU - hemodynamically stable.  Condition: stable         Additional Details: NA    Attending Attestation: I was present and scrubbed for the key portions of the procedure.    Jung Plunkett  Phone Number: [phone]

## 2024-06-17 NOTE — ANESTHESIA PROCEDURE NOTES
Peripheral Block    Patient location during procedure: pre-op  Start time: 6/17/2024 12:55 PM  End time: 6/17/2024 1:02 PM  Reason for block: procedure for pain, at surgeon's request and post-op pain management  Staffing  Performed: attending   Authorized by: Camilo Orellana MD    Performed by: Camilo Orellana MD  Preanesthetic Checklist  Completed: patient identified, IV checked, site marked, risks and benefits discussed, surgical consent, monitors and equipment checked, pre-op evaluation and timeout performed   Timeout performed at: 6/17/2024 12:55 PM  Peripheral Block  Patient position: laying flat  Prep: ChloraPrep  Patient monitoring: continuous pulse ox and heart rate  Block type: adductor canal  Laterality: left  Injection technique: single-shot  Local infiltration: lidocaine  Infiltration strength: 1 %  Dose: 3 mL  Needle  Needle type: pencil-tip   Needle gauge: 22 G  Needle length: 8 cm  Needle localization: ultrasound guidance  Assessment  Injection assessment: negative aspiration for heme, no paresthesia on injection, incremental injection and local visualized surrounding nerve on ultrasound  Heart rate change: no  Slow fractionated injection: yes  Additional Notes  Inormed consent given and all questions answered. Incremental dosing with 30cc 0.5% bupivicaine 1:200K epi with 4mg decadron. Well tolerated.

## 2024-06-17 NOTE — ANESTHESIA PREPROCEDURE EVALUATION
Patient: Hammad Blackburn    Procedure Information       Date/Time: 06/17/24 1230    Procedure: Left Knee Total Arthroplasty (Left: Knee)    Location: [facility] A OR 05 / [facility] A OR    Surgeons: Jung Plunkett MD     Cardiology consult and cath report in chart       Relevant Problems   Anesthesia (within normal limits)      Cardiac   (+) Arteriosclerosis of coronary artery   (+) Benign essential hypertension   (+) Coronary artery disease involving native coronary artery of native heart with angina pectoris (CMS-HCC)   (+) Hyperlipidemia      Pulmonary   (+) Asthmatic bronchitis with acute exacerbation (HHS-HCC)   (+) COPD with acute bronchitis (Multi)   (+) Obstructive sleep apnea syndrome (No CPAP)      Neuro   (+) Anxiety   (+) Diabetic peripheral neuropathy (Multi)   (+) Idiopathic peripheral neuropathy   (+) Lumbar radiculopathy, chronic   (+) Sciatica of left side      GI   (+) Esophageal reflux (Mostly controlled with omeprazole)      /Renal   (+) Benign prostatic hyperplasia without lower urinary tract symptoms      Liver   (+) Cholelithiasis without obstruction   (+) Nonalcoholic steatohepatitis (CELIS)      Endocrine   (+) Acquired hypothyroidism   (+) Diabetic peripheral neuropathy (Multi)   (+) Type 2 diabetes mellitus without complications (Multi) (Off Ozempic for 2 weeks.)      Musculoskeletal   (+) Degenerative arthritis of finger, right   (+) Lumbar spondylosis   (+) Lumbosacral spondylosis   (+) Primary osteoarthritis of left knee      HEENT   (+) Sinusitis      ID   (+) Fungal skin infection      Skin   (+) Maculopapular rash       Clinical information reviewed:   Tobacco  Allergies  Meds   Med Hx  Surg Hx   Fam Hx  Soc Hx        NPO Detail:  NPO/Void Status  Carbohydrate Drink Given Prior to Surgery? : N  Date of Last Liquid: 06/17/24  Time of Last Liquid: 0900  Date of Last Solid: 06/16/24  Time of Last Solid: 2330  Last Intake Type: Clear fluids  Time of Last Void: 1128         Physical  Exam    Airway  Mallampati: III  TM distance: >3 FB  Neck ROM: full     Cardiovascular    Dental    Pulmonary    Abdominal            Anesthesia Plan    History of general anesthesia?: yes  History of complications of general anesthesia?: no    ASA 3     regional and spinal     The patient is a current smoker.    intravenous induction   Anesthetic plan and risks discussed with patient and spouse.    Plan discussed with CRNA.

## 2024-06-17 NOTE — DISCHARGE INSTRUCTIONS
Jung Plunkett MD MS  [address] [facility] [address] [facility]   [address]  [phone] (office)  [phone] (fax)    PLEASE READ CAREFULLY BEFORE CONTACTING YOUR PROVIDER.    WE WORK COLLABORATIVELY AS A TEAM. CALLING MULTIPLE STAFF MEMBERS REGARDING THE SAME ISSUE WILL DELAY YOUR CARE.  [employer] IS THE PREFERRED COMMUNICATION FOR ALL TEAM MEMBERS.    Postoperative Instructions: TOTAL KNEE ARTHROPLASTY    JOINT CARE TEAM  Please use the information below to contact your care team following surgery.  If you are leaving a message, please include your full name, date of birth and date of surgery so that we can correctly identify you.  Your call will be returned within 1-2 business days, please do not leave multiple messages regarding a single issue while you are awaiting a return call.     Who to call Contact Information Matters needing handled   Jung Plunkett MD [employer] [facility]  [phone] Prescription Refills   Orders for dental antibiotics lifelong     Rashmi Ruth MBA, BSN, RN-BC  BARBRA Amato, RN  Ortho Program Navigators - BARBRA Claire, RN  Ortho Coordinator Delvis COOPERN-BC  Ortho Nurse Navigator   [phone] Nursing, medical question related questions or concerns within 6 weeks of surgery   Orders for Outpatient Physical Therapy         [place]           [phone]     Scheduling office Visits  Medical questions/concerns  Leave of Absence or other paperwork  Any concerns more than 6 weeks from surgery - an appointment will need to be made     MEDICATION REFILLS - (MyChart or Main Office)    You will not receive a call indicating that your prescription has been filled.  Please contact your pharmacy with any questions.    Medication refills will be filled Monday-Friday 7am to 1pm ONLY. Please call the office or send a [employer] message for a refill request.  Any requests received outside of this timeframe will be handled on the next  business day.  The office staff and orthopedic nurses cannot refill medications; messages should be left directly through the office or via my chart.  Please do not call multiple times or call other members of the care team for medication needs, this will cause the refill to take longer.    Per State and Institutional policy, pain medications can only be refilled every 7 days for up to six weeks following surgery.    DRIVING & TRAVEL AFTER SURGERY   Patients should anticipate waiting at least 4-6 weeks before traveling long distances after surgery.  You will need to stop to walk around ever 1 hour during your travel to help with blood clot prevention.  Please call the office or your joint nurse to discuss prior to post-surgical travel.  Patients may not drive until cleared by the joint nurse or the office.    DENTAL PROCEDURES & CLEANINGS  You must wait a minimum of 3 months for elective dental appointments, including routine cleanings or dental work including bridges, crowns, extractions, etc..  For any dental visit - cleaning or dental procedures - patients must take an antibiotic 1 hour before the appointment.  Antibiotics are a lifelong need before dental appointments.  The antibiotic prescribed will be based on each patient's allergies.    WOUND CARE  You have a wound management system applied that is to remain in place for 7-8 days. Once removed, it is to be replaced with a Mepilex Ag dressing that will then remain in place for 7 days, and the wound management dressing/system discarded. You or your home therapist should remove the Mepilex after the 7 days. You may leave your incision open to air after the bandage has been removed.  Showering with the wound management system: Please refer to the patient guide handout provided for specific showering instructions and alerts to be aware of based on the unit you have.   Pain and swelling are normal following surgery and can last for weeks to months depending on the  patient.  To help relieve these symptoms, please follow the post-operative pain regimen as it has been prescribed, use ice often, wear compression stockings every day as prescribed, and elevate your leg every hour.   Leaving the hospital, you have an ACE wrap in place. Two days after surgery, you can remove the ACE wrap and discard it. It does NOT need to be reapplied again.  You have a waterproof bandage on your wound and may shower with this on. The waterproof bandage is to remain in place for a 7 days. You or your home therapist should remove it at this time. You may leave your incision open to air after the bandage has been removed.  You may shower 48 hours after surgery.  Do not scrub directly over or near the edges of your surgical bandage.  Soap and water may run freely over the site.  Under your waterproof bandage you have Steri-strips or a mesh covering in place. DO NOT peel them off. They will fall off on their own. You can continue to shower with these on. Let the water run freely over your incision when showering and do not scrub the incision or the surrounding area.   When drying the area around the incision, please use a SEPARATE towel that was not used on the rest of your body. The towel should be recently laundered but does not have to be cleaned a specific way. It should be laundered every 3 days. Pat the area dry. Do not rub the area around the incision. Steri strips will fall off in 1-2 weeks. If after 2 weeks they, have not, gently peel them off.  You may have clear stitches at the ends of your wound. Place Band-Aids on them for the first few weeks to prevent rubbing. You can gently tug on them after 2 weeks and they will come out or fall out on their own. DO NOT cut them. If they have not fallen out by you post-op visit, your doctor will remove them  If you have black stitches in place, your doctor will remove them in 2 weeks. These CANNOT get wet. If you have the silver waterproof bandage in  place, you can shower. If the waterproof bandage is removed or not sticking, you CANNOT shower.  DO NOT soak your incision in a bath, hot tub, pool or pond/lake for a minimum of 12 weeks following your surgery.  DO NOT use lotions, creams, ointments on your wound for a minimum of 6 weeks following your surgery. At that time you may use vitamin E to assist with softening of your incision. Your physical therapist or doctor will talk to you about scar massage which can be started at 6 weeks.   You do NOT need to use the soap that was provided to you prior to surgery after surgery. Use regular soap or shampoo.     PAIN, SWELLING, BRUISING & CLICKING  Pain and swelling are a natural part of your recovery which is considered normal fur up to a year after surgery.  Symptoms may be treated with movement, ice, compression stockings, elevating your leg, and by following the pain medication regimen as prescribed.  Bruising is normal for several weeks after surgery and will run down the leg over time.  You may ice areas that are tender to help with discomfort.  You are required to wear the provided compression stockings, every day, for 4 weeks following surgery.  Remove the stockings at night and place them back on in the morning.  Pain and swelling may temporarily increase with an increase in activity or exercise.  Use ice after activity.  Audible clicking with movement or exercises is considered normal following joint replacement.  You may also feel decreased sensation or numbness near the incision site.  These are usually normal and may or may not fade over time.     PERSONAL HYGIENE  You may shower upon discharging from the hospital.  Soap and water is permitted to run over the surgical dressing, steri-strips and incision.  Do not scrub directly over these items.  DO NOT soak your incision in a bath, hot tub, pool or pond/lake for a minimum of 12 weeks following your surgery.  DO NOT use lotions, creams, ointments on your  wound for a minimum of 6 weeks following your surgery. At that time you may use vitamin E to assist with softening of your incision.      RESTARTING HOME ROUTINE - DIET & MEDICATIONS  Post-operative constipation can result due to a combination of inactivity, anesthesia and pain medication. To help prevent this, you should increase your water and fiber intake. Physical activity such as walking will also help stimulate the bowels.   You may resume your normal diet when you discharge home.  Choose foods that help promote good bowel habits and prevent constipation, such as foods high in fiber.  You may restart your home medications the following day after your surgery UNLESS you have been given alternate instructions.  Follow the instructions given to you on your hospital discharge instructions for more information regarding your home medications.      IN-HOME PHYSICAL THERAPY & OUTPATIENT PHYSICAL THERAPY  Remember to get up and walk around your home every hour to 90 minutes to prevent blood clots and help with your recovery. This is an ACTIVE recovery.  In-home physical therapy will start 1-2 days after you get home from the hospital.    The home care agency will call within the first 24-48 hours to set up their first visit.  Please do not call your care team to inquire during this timeframe.  Continue the exercises you were given in the hospital until you have been seen by in-home therapy.  Make sure to provide a phone number with the ability for the home care staff to leave a message if you do not answer your phone.    Outpatient physical therapy following knee replacement surgery should begin 2-3 weeks after surgery if you and your physical therapist feel that you need it.  You should call to schedule this appointment ASAP if not already scheduled before surgery.  Waiting until you are ready for outpatient physical therapy will cause a delay in your care.  You may choose any outpatient physical therapy location.   Call the office for an order if needed.  Range of motion: Your physical therapist will work with you on regaining your range of motion after surgery. By 4 weeks after surgery, you should be able to bend to at least 100 degrees if not more.     NEVER place a pillow or blanket under your knee. When you are in bed or sitting on a couch, keep your knee COMPLETELY STRAIGHT. This is best done by placing a pillow or blanket under your calf or heel to lift your knee off of the mattress or couch.     EMERGENCIES - WHEN TO CONTACT THE SURGEON'S OFFICE IMMEDIATELY  Fever >101 with chills that has been present for at least 48 hours.   Excessive bleeding from incision that will not slow down. A small amount of drainage is normal and expected.  Once pressure is applied and the area is covered, do not continue to check the area regularly.  This will remove pressure and bleeding will continue.  Leave in place for 4-6 hours.  Signs of infection of incision-excessive drainage that is soaking through your dressing (especially if it is pus-like), redness that is spreading out from the edges of your incision, or increased warmth around the area.  Excruciating pain for which the pain medication, taken as instructed, is not helping.  Severe calf pain.  Go directly to the emergency room or call 911, if you are experiencing chest pain or difficulty breathing.    ICE & COLD THERAPY INSTRUCTIONS    To assist with pain control and post-op swelling, you should be using ice regularly throughout recovery, especially for the first 6 weeks, regardless of the cold therapy method you use.      Always make sure there is a layer of protection between the cold pad and your skin.    If you are using ICE PACKS or GEL PACKS, you will need to alternate 20 minutes on, 20 minutes off twice per hour.    If you are using an ICE MACHINE, please follow the provided ice machine instructions.  These devices differ from ice or ice packs whereas the mechanism  circulates water through tubing and a pad to provide longer periods of cold therapy to the desired site.  You can use your cold devices around the clock for optimal comfort.  We recommend using cold therapy after working with therapy or completing exercises on your own.  There is no set schedule in which you must follow while using cold therapy.  Below are a few points to remember when using a cold therapy device:    You do not need to need to use the 20 on, 20 off method.  Detach the pad from the cooler and ambulate at least once every hour.  You can check your skin under the pad at this time.  You may wear the cold therapy device during periods of sleep including overnight.  If you wake up during the night, you can check the skin at this time.  You do not need to wake up specifically to perform skin checks.  Empty the cooler and pad when device is not in use.  Follow 's instructions for cleaning your cold therapy device.      DISCHARGE MEDICATIONS - Please reference the sample schedule for instructions on how to best schedule medications.  PAIN MEDICATION    ___X_ Tramadol / Oxycodone  Tramadol and Oxycodone have been prescribed for post-operative pain control.    These medications will only be refilled ONCE every 7 days for a period of up to 6 weeks following surgery.  After 6 weeks, you will transition to acetaminophen and over -the- counter anti-inflammatories such as Ibuprofen, Advil or Aleve in conjunction with ICE/COLD THERAPY.   Side effects may be constipation and nausea, vomiting, sleepiness, dizziness, lightheadedness, headache, blurred vision, dry mouth sweating, itching (if you have itching, over-the -counter Benadryl can be used as needed).  You may NOT operate a motor vehicle while taking these medications or have been cleared by your care team.     ___X_ Acetaminophen (Tylenol)  Acetaminophen has been prescribed as an adjunct for pain control. Take two 500 mg tablets every 8 hours for 4  weeks. You will not receive a refill on this medication.  Do not exceed 4000mg of acetaminophen within a 24 hour period.  Side effects may include nausea, heartburn, drowsiness, and headache.    _______ Meloxicam (Mobic)-Meloxicam has been prescribed as an adjunct anti-inflammatory to assist in pain control.    Take one 15mg tablet once daily for 4 weeks.  You will not receive refills on this medication.   Side effects may include nausea.  May not be prescribed if you are on a more potent blood thinner than aspirin or have chronic kidney disease.    BLOOD THINNER    ___X_ Blood Thinner   Aspirin, eliquis or xarelto has been prescribed as a blood thinner to prevent blood clots in your leg or lungs. Take as prescribed on the bottle for 4 weeks. You will not receive a refill on this medication.  Do not take this medication if you are on another blood thinner.  Do not take any additional anti-inflammatory medications while taking Aspirin or another blood thinning medication.  Examples may include, Meloxicam, Celebrex, Ibuprofen, Motrin, Aleve, or Advil.     ANTI NAUSEA    ___X_ Pantoprazole (Protonix)  Pantoprazole has been prescribed to help with nausea and protect your stomach while taking pain medication. Take one 40 mg tablet once daily for 4 weeks. You will not receive a refill on this medication.    ___X_ Zofran (Ondansetron)  Zofran has been prescribed to help with nausea and protect your stomach while taking pain medication. Take one 4 mg tablet every eight hours as needed for nausea. Refills will be provided as necessary.      STOOL SOFTENERS    ___X_ Colace (Docusate Sodium) and Senna (Sennoside)  Post-operative constipation can result due to a combination of inactivity, anesthesia and pain medication. To help prevent this, you should increase your water and fiber intake. Physical activity such as walking will also help stimulate the bowels.   Colace has been prescribed to help with constipation while on  Oxycodone and Tramadol. Take one 100 mg tablet twice daily for 4 weeks. No refills needed.  Senna has been prescribed in combination with Colace to help with constipation while taking your pain medications.  Take one 8.6mg Tablet once daily.    ANTIBIOTICS    ___X_ Cefadroxil or Doxycycline   Post-operative prevention of infection   Side effects can be upset stomach or diarrhea  Take with food. Do not take on an empty stomach  May not be prescribed       You will not receive refills on the following medications:  Acetaminophen (Tylenol)  Senna  Colace  Pantoprazole  Blood Thinner (Aspirin or Eliquis)  Antibiotic (Cefadroxil or Doxycycline)  Pain Medication Refills - [phone] or MyChart - Monday through Friday 7am-1pm    Medication refills will be sent upon receipt of your request during the times listed above. Due to the high call volume, you will not receive a call confirming prescription refills; please do not call multiple times.  Prescription refills may take a few hours to process, you may follow up with your pharmacy for pickup availability.    SAMPLE              The times below are an example of how to organize medications to optimize pain control  Your actual medication schedule may vary based on your last dose taken IN THE HOSPITAL      Time 3:00am 6:00am 9:00am 12:00pm 3:00pm 6:00pm 9:00pm 12:00am   Medications Tramadol Acetaminophen (Tylenol)   Oxycodone  Senna   Blood Thinner  Colace  Pantoprazole  Tramadol  Antibiotic  Oxycodone Tramadol  Acetaminophen (Tylenol)  Oxycodone     Blood Thinner  Colace  Tramadol  Antibiotic  Acetaminophen (Tylenol)   Oxycodone            You may begin to wean off the pain medication as your pain remains controlled with increased activity.  The schedules provided are meant to serve as an example.  You may wean off based on your pain control.  Please note that pain medications are not filled beyond 6 weeks after surgery.              The times below are an example of how  to WEAN OFF medications WHILE CONTINUING TO OPTIMIZE PAIN CONTROL.  Your actual medication schedule may vary based on your last dose taken.  Time 12:00am 4:00am 8:00am 12:00pm 4:00pm 8:00pm   Med Tramadol Oxycodone   Tramadol Oxycodone Tramadol Oxycodone     Time 12:00am 6:00am 12:00pm 6:00pm   Med Tramadol Oxycodone   Tramadol Oxycodone     Time 12:00am 8:00am 4:00pm   Med Tramadol Oxycodone   Tramadol     Time 12:00am 12:00pm   Med Tramadol Tramadol

## 2024-06-18 ENCOUNTER — DOCUMENTATION (OUTPATIENT)
Dept: HOME HEALTH SERVICES | Facility: HOME HEALTH | Age: 64
End: 2024-06-18
Payer: COMMERCIAL

## 2024-06-18 ENCOUNTER — PHARMACY VISIT (OUTPATIENT)
Dept: PHARMACY | Facility: CLINIC | Age: 64
End: 2024-06-18
Payer: COMMERCIAL

## 2024-06-18 VITALS
WEIGHT: 277.56 LBS | BODY MASS INDEX: 35.62 KG/M2 | TEMPERATURE: 98.1 F | HEART RATE: 64 BPM | RESPIRATION RATE: 16 BRPM | DIASTOLIC BLOOD PRESSURE: 73 MMHG | OXYGEN SATURATION: 100 % | SYSTOLIC BLOOD PRESSURE: 140 MMHG | HEIGHT: 74 IN

## 2024-06-18 PROBLEM — M17.12 ARTHRITIS OF LEFT KNEE: Status: RESOLVED | Noted: 2024-01-06 | Resolved: 2024-06-18

## 2024-06-18 PROBLEM — Z96.652 S/P TOTAL KNEE ARTHROPLASTY, LEFT: Status: RESOLVED | Noted: 2024-06-16 | Resolved: 2024-06-18

## 2024-06-18 LAB
ANION GAP SERPL CALC-SCNC: 13 MMOL/L (ref 10–20)
APPEARANCE UR: CLEAR
BILIRUB UR STRIP.AUTO-MCNC: NEGATIVE MG/DL
BUN SERPL-MCNC: 22 MG/DL (ref 6–23)
CALCIUM SERPL-MCNC: 8.8 MG/DL (ref 8.6–10.3)
CHLORIDE SERPL-SCNC: 102 MMOL/L (ref 98–107)
CO2 SERPL-SCNC: 27 MMOL/L (ref 21–32)
COLOR UR: COLORLESS
CREAT SERPL-MCNC: 0.99 MG/DL (ref 0.5–1.3)
EGFRCR SERPLBLD CKD-EPI 2021: 85 ML/MIN/1.73M*2
ERYTHROCYTE [DISTWIDTH] IN BLOOD BY AUTOMATED COUNT: 13.2 % (ref 11.5–14.5)
GLUCOSE BLD MANUAL STRIP-MCNC: 175 MG/DL (ref 74–99)
GLUCOSE BLD MANUAL STRIP-MCNC: 188 MG/DL (ref 74–99)
GLUCOSE SERPL-MCNC: 156 MG/DL (ref 74–99)
GLUCOSE UR STRIP.AUTO-MCNC: NORMAL MG/DL
HCT VFR BLD AUTO: 36.9 % (ref 41–52)
HGB BLD-MCNC: 11.9 G/DL (ref 13.5–17.5)
KETONES UR STRIP.AUTO-MCNC: NEGATIVE MG/DL
LEUKOCYTE ESTERASE UR QL STRIP.AUTO: NEGATIVE
MCH RBC QN AUTO: 29 PG (ref 26–34)
MCHC RBC AUTO-ENTMCNC: 32.2 G/DL (ref 32–36)
MCV RBC AUTO: 90 FL (ref 80–100)
NITRITE UR QL STRIP.AUTO: NEGATIVE
NRBC BLD-RTO: 0 /100 WBCS (ref 0–0)
PH UR STRIP.AUTO: 5.5 [PH]
PLATELET # BLD AUTO: 198 X10*3/UL (ref 150–450)
POTASSIUM SERPL-SCNC: 4.3 MMOL/L (ref 3.5–5.3)
PROT UR STRIP.AUTO-MCNC: NEGATIVE MG/DL
RBC # BLD AUTO: 4.1 X10*6/UL (ref 4.5–5.9)
RBC # UR STRIP.AUTO: NEGATIVE /UL
SODIUM SERPL-SCNC: 138 MMOL/L (ref 136–145)
SP GR UR STRIP.AUTO: 1.01
UROBILINOGEN UR STRIP.AUTO-MCNC: NORMAL MG/DL
WBC # BLD AUTO: 13.2 X10*3/UL (ref 4.4–11.3)

## 2024-06-18 PROCEDURE — 9420000001 HC RT PATIENT EDUCATION 5 MIN

## 2024-06-18 PROCEDURE — 97116 GAIT TRAINING THERAPY: CPT | Mod: GP

## 2024-06-18 PROCEDURE — 2500000004 HC RX 250 GENERAL PHARMACY W/ HCPCS (ALT 636 FOR OP/ED)

## 2024-06-18 PROCEDURE — 82374 ASSAY BLOOD CARBON DIOXIDE: CPT

## 2024-06-18 PROCEDURE — 82947 ASSAY GLUCOSE BLOOD QUANT: CPT | Mod: 91

## 2024-06-18 PROCEDURE — 36415 COLL VENOUS BLD VENIPUNCTURE: CPT

## 2024-06-18 PROCEDURE — 97110 THERAPEUTIC EXERCISES: CPT | Mod: GP

## 2024-06-18 PROCEDURE — 2500000001 HC RX 250 WO HCPCS SELF ADMINISTERED DRUGS (ALT 637 FOR MEDICARE OP)

## 2024-06-18 PROCEDURE — 2500000002 HC RX 250 W HCPCS SELF ADMINISTERED DRUGS (ALT 637 FOR MEDICARE OP, ALT 636 FOR OP/ED)

## 2024-06-18 PROCEDURE — RXMED WILLOW AMBULATORY MEDICATION CHARGE

## 2024-06-18 PROCEDURE — 81003 URINALYSIS AUTO W/O SCOPE: CPT

## 2024-06-18 PROCEDURE — 97161 PT EVAL LOW COMPLEX 20 MIN: CPT | Mod: GP

## 2024-06-18 PROCEDURE — 85027 COMPLETE CBC AUTOMATED: CPT | Mod: 91

## 2024-06-18 PROCEDURE — 7100000011 HC EXTENDED STAY RECOVERY HOURLY - NURSING UNIT

## 2024-06-18 RX ORDER — TAMSULOSIN HYDROCHLORIDE 0.4 MG/1
0.4 CAPSULE ORAL DAILY
Qty: 14 CAPSULE | Refills: 0 | Status: SHIPPED | OUTPATIENT
Start: 2024-06-18 | End: 2024-07-02

## 2024-06-18 RX ORDER — TAMSULOSIN HYDROCHLORIDE 0.4 MG/1
0.4 CAPSULE ORAL DAILY
Status: DISCONTINUED | OUTPATIENT
Start: 2024-06-18 | End: 2024-06-18 | Stop reason: HOSPADM

## 2024-06-18 SDOH — HEALTH STABILITY: MENTAL HEALTH: HOW OFTEN DO YOU HAVE A DRINK CONTAINING ALCOHOL?: NEVER

## 2024-06-18 SDOH — ECONOMIC STABILITY: HOUSING INSECURITY
IN THE LAST 12 MONTHS, WAS THERE A TIME WHEN YOU DID NOT HAVE A STEADY PLACE TO SLEEP OR SLEPT IN A SHELTER (INCLUDING NOW)?: NO

## 2024-06-18 SDOH — HEALTH STABILITY: MENTAL HEALTH: HOW OFTEN DO YOU HAVE 6 OR MORE DRINKS ON ONE OCCASION?: NEVER

## 2024-06-18 SDOH — SOCIAL STABILITY: SOCIAL NETWORK: ARE YOU MARRIED, WIDOWED, DIVORCED, SEPARATED, NEVER MARRIED, OR LIVING WITH A PARTNER?: MARRIED

## 2024-06-18 SDOH — ECONOMIC STABILITY: FOOD INSECURITY: WITHIN THE PAST 12 MONTHS, THE FOOD YOU BOUGHT JUST DIDN'T LAST AND YOU DIDN'T HAVE MONEY TO GET MORE.: NEVER TRUE

## 2024-06-18 SDOH — SOCIAL STABILITY: SOCIAL INSECURITY
WITHIN THE LAST YEAR, HAVE YOU BEEN KICKED, HIT, SLAPPED, OR OTHERWISE PHYSICALLY HURT BY YOUR PARTNER OR EX-PARTNER?: NO

## 2024-06-18 SDOH — ECONOMIC STABILITY: INCOME INSECURITY: IN THE PAST 12 MONTHS, HAS THE ELECTRIC, GAS, OIL, OR WATER COMPANY THREATENED TO SHUT OFF SERVICE IN YOUR HOME?: NO

## 2024-06-18 SDOH — ECONOMIC STABILITY: TRANSPORTATION INSECURITY
IN THE PAST 12 MONTHS, HAS LACK OF TRANSPORTATION KEPT YOU FROM MEETINGS, WORK, OR FROM GETTING THINGS NEEDED FOR DAILY LIVING?: NO

## 2024-06-18 SDOH — ECONOMIC STABILITY: INCOME INSECURITY: IN THE LAST 12 MONTHS, WAS THERE A TIME WHEN YOU WERE NOT ABLE TO PAY THE MORTGAGE OR RENT ON TIME?: NO

## 2024-06-18 SDOH — HEALTH STABILITY: MENTAL HEALTH: HOW MANY STANDARD DRINKS CONTAINING ALCOHOL DO YOU HAVE ON A TYPICAL DAY?: PATIENT DOES NOT DRINK

## 2024-06-18 SDOH — SOCIAL STABILITY: SOCIAL INSECURITY: WITHIN THE LAST YEAR, HAVE YOU BEEN HUMILIATED OR EMOTIONALLY ABUSED IN OTHER WAYS BY YOUR PARTNER OR EX-PARTNER?: NO

## 2024-06-18 SDOH — ECONOMIC STABILITY: TRANSPORTATION INSECURITY
IN THE PAST 12 MONTHS, HAS THE LACK OF TRANSPORTATION KEPT YOU FROM MEDICAL APPOINTMENTS OR FROM GETTING MEDICATIONS?: NO

## 2024-06-18 SDOH — ECONOMIC STABILITY: FOOD INSECURITY: WITHIN THE PAST 12 MONTHS, YOU WORRIED THAT YOUR FOOD WOULD RUN OUT BEFORE YOU GOT MONEY TO BUY MORE.: NEVER TRUE

## 2024-06-18 SDOH — SOCIAL STABILITY: SOCIAL INSECURITY
WITHIN THE LAST YEAR, HAVE TO BEEN RAPED OR FORCED TO HAVE ANY KIND OF SEXUAL ACTIVITY BY YOUR PARTNER OR EX-PARTNER?: NO

## 2024-06-18 SDOH — ECONOMIC STABILITY: HOUSING INSECURITY: IN THE LAST 12 MONTHS, HOW MANY PLACES HAVE YOU LIVED?: 1

## 2024-06-18 SDOH — SOCIAL STABILITY: SOCIAL INSECURITY: WITHIN THE LAST YEAR, HAVE YOU BEEN AFRAID OF YOUR PARTNER OR EX-PARTNER?: NO

## 2024-06-18 SDOH — ECONOMIC STABILITY: INCOME INSECURITY: HOW HARD IS IT FOR YOU TO PAY FOR THE VERY BASICS LIKE FOOD, HOUSING, MEDICAL CARE, AND HEATING?: NOT HARD AT ALL

## 2024-06-18 ASSESSMENT — PAIN DESCRIPTION - DESCRIPTORS
DESCRIPTORS: ACHING
DESCRIPTORS: ACHING

## 2024-06-18 ASSESSMENT — ACTIVITIES OF DAILY LIVING (ADL)
LACK_OF_TRANSPORTATION: NO
ADL_ASSISTANCE: INDEPENDENT

## 2024-06-18 ASSESSMENT — PAIN SCALES - GENERAL
PAINLEVEL_OUTOF10: 7
PAINLEVEL_OUTOF10: 7
PAINLEVEL_OUTOF10: 4
PAINLEVEL_OUTOF10: 7
PAINLEVEL_OUTOF10: 4

## 2024-06-18 ASSESSMENT — PAIN - FUNCTIONAL ASSESSMENT
PAIN_FUNCTIONAL_ASSESSMENT: 0-10

## 2024-06-18 ASSESSMENT — COGNITIVE AND FUNCTIONAL STATUS - GENERAL
MOVING TO AND FROM BED TO CHAIR: A LITTLE
TURNING FROM BACK TO SIDE WHILE IN FLAT BAD: A LITTLE
CLIMB 3 TO 5 STEPS WITH RAILING: A LITTLE
MOBILITY SCORE: 19
STANDING UP FROM CHAIR USING ARMS: A LITTLE
WALKING IN HOSPITAL ROOM: A LITTLE

## 2024-06-18 ASSESSMENT — PAIN DESCRIPTION - ORIENTATION
ORIENTATION: LEFT
ORIENTATION: LEFT

## 2024-06-18 ASSESSMENT — PAIN DESCRIPTION - LOCATION
LOCATION: KNEE
LOCATION: KNEE

## 2024-06-18 ASSESSMENT — LIFESTYLE VARIABLES
AUDIT-C TOTAL SCORE: 0
SKIP TO QUESTIONS 9-10: 1

## 2024-06-18 NOTE — PROGRESS NOTES
06/18/24 1120   Discharge Planning   Living Arrangements Spouse/significant other   Support Systems Spouse/significant other   Assistance Needed None   Type of Residence Private residence   Number of Stairs to Enter Residence 4   Number of Stairs Within Residence 6   Do you have animals or pets at home? No   Who is requesting discharge planning? Provider   Home or Post Acute Services In home services   Type of Home Care Services Home PT   Patient expects to be discharged to: Home   Does the patient need discharge transport arranged? No   Financial Resource Strain   How hard is it for you to pay for the very basics like food, housing, medical care, and heating? Not hard   Housing Stability   In the last 12 months, was there a time when you were not able to pay the mortgage or rent on time? N   In the last 12 months, how many places have you lived? 1   In the last 12 months, was there a time when you did not have a steady place to sleep or slept in a shelter (including now)? N   Transportation Needs   In the past 12 months, has lack of transportation kept you from medical appointments or from getting medications? no   In the past 12 months, has lack of transportation kept you from meetings, work, or from getting things needed for daily living? No     6/18/24 1120  Met with patient and wife at bedside to discuss discharge planning.  Patient lives at home with his wife in a house.  He is independent with ADLs and drives at baseline.  He does not use any assistive devices for ambulation.  He does need a walker for discharge.  Ortho to place order for walker.  Patient and wife are agreeable to [facility].  Explained options and expectations to patient and wife.  They would like to use [facility].  Waiting on SOC confirmation from [facility].   Cate Esteban RN TCC    6/18/24 9528  SOC confirmed with [facility] for tomorrow.  Cate Esteban RN TCC

## 2024-06-18 NOTE — NURSING NOTE

## 2024-06-18 NOTE — NURSING NOTE
Interdisciplinary team present: NP, PT, NM, CC, SW, Orthopedic Coordinator, and bedside RN.  Pain - controlled  Nausea - none  Discharge barrier - n/a  Discharge plan - Home with [facility]  Discharge date/time - 6/18/24

## 2024-06-18 NOTE — HH CARE COORDINATION
Home Care received a Referral for Physical Therapy. We have processed the referral for a Start of Care on 6/19/24.     If you have any questions or concerns, please feel free to contact us at [phone]. Follow the prompts, enter your five digit zip code, and you will be directed to your care team on [facility] 1.

## 2024-06-18 NOTE — DISCHARGE SUMMARY
"Discharge Diagnosis  Arthritis of left knee    Issues Requiring Follow-Up  Follow up with surgeon for post op check     Test Results Pending At Discharge  Pending Labs       No current pending labs.            Hospital Course   63 yr old male with left knee arthritis now POD#1 s/p left knee total arthroplasty on 06/17/24 with Dr. Plunkett. Please see operative report for full details. Patient tolerated the procedure well and recovered briefly in PACU before being transitioned to regular nursing floor. Post-op course was overall uncomplicated, c/o of urinary symptoms, does not feel like adequately emptying bladder today, PVR <100ml, negative for UA, will send home with short course of flomax.  Diet was advanced as tolerated.  IV medication transitioned to oral as diet advanced. On the day of discharge, the pt was tolerating a diet, pain was controlled on PO pain medication, and they were ambulating and voiding spontaneously. Pt discharged to home on 06/18/24 in stable condition with instructions to follow up as outpatient.       Pertinent Physical Exam At Time of Discharge  PE:  Constitutional: A&Ox3, calm and cooperative  Eyes:  clear sclera   ENMT: Moist mucous membranes  Head/Neck: Neck supple  Cardiovascular: Normal rate and regular rhythm.  Respiratory/Thorax:  Good symmetric chest expansion.   Gastrointestinal: Abdomen soft, non-tender  Genitourinary: Voiding independently   Musculoskeletal: s/p left total knee replacement. Denies numbness, dressing C/D/I, able to wiggle toes, neurovascularly intact, <CRT, 2+ DP/PT pulses  Extremities: No peripheral edema  Neurological: A&Ox3  Psychological: Appropriate mood and behavior  Skin: Warm and dry    /82   Pulse 66   Temp 36.1 °C (97 °F)   Resp 18   Ht 1.88 m (6' 2\")   Wt 126 kg (277 lb 9 oz)   SpO2 98%   BMI 35.64 kg/m²       Home Medications     Medication List      START taking these medications     cefadroxil 500 mg capsule; Commonly known as: " Duricef; Take 1 capsule   (500 mg) by mouth 2 times a day for 5 days.   docusate sodium 100 mg capsule; Commonly known as: Colace; Take 1   capsule (100 mg) by mouth 2 times a day for 15 days.   ondansetron 4 mg tablet; Commonly known as: Zofran; Take 1 tablet (4 mg)   by mouth every 8 hours if needed for nausea or vomiting.   oxyCODONE 5 mg immediate release tablet; Commonly known as: Roxicodone;   Take 1-2 tablets (5-10 mg) by mouth every 6 hours if needed for severe   pain (7 - 10) for up to 7 days.   Pain Relief ES (acetaminophen) 500 mg tablet; Generic drug:   acetaminophen; Take 2 tablets (1,000 mg) by mouth every 8 hours for 20   days.; Replaces: acetaminophen 650 mg ER tablet   pantoprazole 40 mg EC tablet; Commonly known as: ProtoNix; Take 1 tablet   (40 mg) by mouth once daily in the morning. Take before meals. Do not   crush, chew, or split.   senna 8.6 mg tablet; Generic drug: sennosides; Take 1 tablet (8.6 mg) by   mouth once daily for 15 days.   tamsulosin 0.4 mg 24 hr capsule; Commonly known as: Flomax; Take 1   capsule (0.4 mg) by mouth once daily for 14 days.   traMADol 50 mg tablet; Commonly known as: Ultram; Take 1-2 tablets   ( mg) by mouth every 6 hours if needed for severe pain (7 - 10) for   up to 7 days.     CHANGE how you take these medications     aspirin 81 mg chewable tablet; Chew 1 tablet (81 mg) 2 times a day.;   What changed: when to take this     CONTINUE taking these medications     allopurinol 300 mg tablet; Commonly known as: Zyloprim; TAKE 1 TABLET BY   MOUTH EVERY DAY   amLODIPine 10 mg tablet; Commonly known as: Norvasc; TAKE 1 TABLET BY   MOUTH EVERY DAY   carvedilol 12.5 mg tablet; Commonly known as: Coreg; Take 1 tablet (12.5   mg) by mouth 2 times a day with meals.   co-enzyme Q-10 30 mg capsule   cyclobenzaprine 10 mg tablet; Commonly known as: Flexeril; Take 1 tablet   (10 mg) by mouth 3 times a day as needed for muscle spasms.   fenofibrate 145 mg tablet; Commonly  known as: Tricor; TAKE 1 TABLET (145   MG) BY MOUTH ONCE DAILY. TAKE WITH FOOD.   fluticasone 50 mcg/actuation nasal spray; Commonly known as: Flonase;   Administer 1 spray into each nostril 2 times a day as needed for rhinitis.   folic acid 1 mg tablet; Commonly known as: Folvite; TAKE 1 TABLET BY   MOUTH EVERY DAY   metFORMIN 500 mg tablet; Commonly known as: Glucophage; TAKE 2 TABLETS   (1,000 MG) BY MOUTH 2 TIMES A DAY. HOLD FOR 48 HOURS DUE TO CONTRAST   OneTouch Delica Plus Lancet 30 gauge misc; Generic drug: lancets; Apply   1 Lancet topically once daily.   OneTouch Ultra Test strip; Generic drug: blood sugar diagnostic; 1 strip   2 times a day.   Ozempic 0.25 mg or 0.5 mg (2 mg/3 mL) pen injector; Generic drug:   semaglutide; Inject 0.5 mg under the skin 1 (one) time per week.   Repatha SureClick 140 mg/mL injection; Generic drug: evolocumab; Inject   1 mL (140 mg) under the skin every 14 (fourteen) days.   rosuvastatin 40 mg tablet; Commonly known as: Crestor; TAKE 1 TABLET BY   MOUTH EVERY DAY   Symbicort 160-4.5 mcg/actuation inhaler; Generic drug:   budesonide-formoteroL   triamcinolone 0.1 % ointment; Commonly known as: Kenalog   Vascepa 1 gram capsule; Generic drug: icosapent ethyL; TAKE 1 CAPSULE (1   G) BY MOUTH ONCE DAILY.     STOP taking these medications     acetaminophen 650 mg ER tablet; Commonly known as: Tylenol 8 HOUR;   Replaced by: Pain Relief ES (acetaminophen) 500 mg tablet   aspirin-acetaminophen-caffeine 250-250-65 mg tablet; Commonly known as:   Excedrin Migraine   chlorhexidine 0.12 % solution; Commonly known as: Peridex   ibuprofen 400 mg tablet   omeprazole 40 mg DR capsule; Commonly known as: PriLOSEC       Outpatient Follow-Up  Future Appointments   Date Time Provider Department Center   6/26/2024  1:15 PM MD PEDRITO [id number] [facility]   6/28/2024  9:20 AM Stewart Harmon MD [id number] [facility]   7/24/2024 10:00 AM MD PEDRITO [id number] [facility]   10/15/2024  10:00 AM ENRICO Cali-CNP [id number] [facility]   4/15/2025 10:00 AM Alan Davis MD UCR1 [facility]       ENRICO Murry-CNP

## 2024-06-18 NOTE — NURSING NOTE
Met with Patient and Care Partner at bedside- Patient is s/p Left Total Knee Replacement with Dr. Plunkett. Discussion with patient included education on the following topics: [facility] Education: Wound Care, Post-Op Activity, Post-Op Precautions, Cold-Therapy, Importance of post-op prescriptions, When to call the Surgeon's Office, Use of MyChart, and When to call 9-1-1. Patient Did Not Complete - Patient had surgery within the last 12 months class prior to surgery. Patient is able to verbalize understanding of class content/discussion. Contact information was provided to patient for support and assistance during the post-operative period. Met with patient, discussed total knee replacement, importance of range of motion/physical therapy, and activity/assistive device. Reviewed constipation, pain management/medications, and cold therapy. Reviewed signs and symptoms of infection, when to call MD/navigator, when to call 911. Discussed DVT prophylaxis and discharge planning. Patient verbalizes understanding of when to call Navigator/MD with questions or concerns following discharge. Patient given prevena packet and woundcare instructions reviewed, including showering. Patient was given a Mepilex dressing to apply to incision once the prevena is removed. Patient was encouraged to call and set up out patient PT this week. He was given My Medication Education tool as a reference for his discharge medications.

## 2024-06-18 NOTE — PROGRESS NOTES
Physical Therapy    Physical Therapy Evaluation & Treatment    Patient Name: Hammad Blackburn  MRN: [id number]  Today's Date: 6/18/2024   Time Calculation  Start Time: 0850  Stop Time: 0951  Time Calculation (min): 61 min    Assessment/Plan   PT Assessment  PT Assessment Results: Decreased strength, Decreased endurance, Impaired balance, Decreased mobility, Decreased range of motion, Pain, Orthopedic restrictions  Rehab Prognosis: Good  Evaluation/Treatment Tolerance: Patient tolerated treatment well  Medical Staff Made Aware: Yes  Strengths: Ability to acquire knowledge, Physical health, Premorbid level of function, Support and attitude of living partners  Barriers to Participation:  (N/A)  End of Session Communication: Bedside nurse  Assessment Comment: Pt is POD #1  s/p L TKA with total knee precautions and WBAT. The pt presents with decreased safety and decreased independence with bed mobility, transfers, and gait/stairs. Contributing to these impairment are post-op pain, decreased L knee ROM, decreased Ll knee strength, decreased balance, and poor stability. The pt would benefit from continued PT to assess progress and further therapy needs, address the above functional limitations and impairments to improve independence and safety and allow for an anticipated d/c with low intensity PT and assist PRN once medically stable at D/C.  End of Session Patient Position: Up in chair, Alarm on   IP OR SWING BED PT PLAN  Inpatient or Swing Bed: Inpatient  PT Plan  Treatment/Interventions: Bed mobility, Transfer training, Gait training, Stair training, Balance training, Neuromuscular re-education, Strengthening, Endurance training, Therapeutic exercise, Therapeutic activity, Home exercise program  PT Plan: Ongoing PT  PT Frequency: BID  PT Discharge Recommendations: Low intensity level of continued care  Equipment Recommended upon Discharge: Wheeled walker  PT Recommended Transfer Status: Stand by assist  PT - OK to Discharge:  Yes (PT POC initiated this date)      Subjective     General Visit Information:  General  Reason for Referral: Pt is a 62 yo male POD #01 s/p L TKA  Referred By: Dr. Plunkett  Past Medical History Relevant to Rehab:   Past Medical History:   Diagnosis Date    Abnormal stress test     followed by heart cath    Anxiety disorder, unspecified     Arthritis     Cardiology follow-up encounter     Scheduled with JACOBO Lay NP for 4/11/2024    COPD (chronic obstructive pulmonary disease) (Multi)     Coronary atherosclerosis     Diabetes mellitus (Multi)     Elevated prostate specific antigen (PSA)     GERD (gastroesophageal reflux disease)     H/O percutaneous left heart catheterization 03/11/2024    1. Severe branch vessel CAD (D1 and OM1) in a right dominant system.  2.  of small inferior branch of OM1.  3. Elevated LVEDP.  4. No evidence of aortic stenosis.    History of depression     Hyperlipidemia     Hypertension     Nephrolithiasis     Sleep apnea     Tobacco use     Unilateral primary osteoarthritis, left knee        Family/Caregiver Present: Yes  Caregiver Feedback: pt's wife present and agreeable to PT evaluation  Prior to Session Communication: Bedside nurse  Patient Position Received: Bed, 3 rail up, Alarm off, not on at start of session  General Comment: Pt pleasant and agreeable to PT evalaution  Home Living:  Home Living  Type of Home: House (bi-level)  Lives With: Spouse  Home Adaptive Equipment: Cane (standard walker)  Home Layout:  (bi-level. 4 BRANDI with BHR (only able to home 1 at time due to width). 2 steps to kitchen/dining room (no HR). 3 steps to bedroom and bathroom with R HR)  Home Access: Stairs to enter with rails  Entrance Stairs-Rails:  (bilateral howevver only able to hold 1 at a time)  Entrance Stairs-Number of Steps: 4  Bathroom Shower/Tub: Tub/shower unit  Bathroom Toilet: Standard, Handicapped height  Bathroom Equipment: Shower chair with back  Prior Level of Function:  Prior Function  Per Pt/Caregiver Report  Level of [place]: Independent with ADLs and functional transfers, Independent with homemaking with ambulation  ADL Assistance: Independent  Homemaking Assistance: Independent  Ambulatory Assistance: Independent  Precautions:  Precautions  LE Weight Bearing Status: Weight Bearing as Tolerated (LLE)  Medical Precautions: Fall precautions  Post-Surgical Precautions: Left total knee precautions    Objective   Pain:  Pain Assessment  Pain Assessment: 0-10  Pain Score: 7  Pain Type: Surgical pain  Pain Location: Knee  Pain Orientation: Left  Pain Interventions: Cold applied, Repositioned, Ambulation/increased activity  Cognition:  Cognition  Overall Cognitive Status: Within Functional Limits  Orientation Level: Oriented X4  Attention: Within Functional Limits  Memory: Within Funtional Limits  Insight: Within function limits  Impulsive: Within functional limits    General Assessments:  Activity Tolerance  Endurance: Tolerates 30 min exercise with multiple rests    Sensation  Light Touch: No apparent deficits    Coordination  Movements are Fluid and Coordinated: Yes    Static Sitting Balance  Static Sitting-Balance Support: No upper extremity supported  Static Sitting-Level of Assistance: Distant supervision  Static Sitting-Comment/Number of Minutes: 2 min    Static Standing Balance  Static Standing-Balance Support: Bilateral upper extremity supported (FWW)  Static Standing-Level of Assistance: Contact guard  Static Standing-Comment/Number of Minutes: 2 min  Functional Assessments:  Bed Mobility  Bed Mobility: Yes  Bed Mobility 1  Bed Mobility 1: Supine to sitting  Level of Assistance 1: Distant supervision  Bed Mobility Comments 1: Increased time to manage LLE    Transfers  Transfer: Yes  Transfer 1  Transfer From 1: Stand to  Transfer to 1: Sit  Technique 1: Sit to stand, Stand to sit  Transfer Device 1: Walker, Gait belt  Transfer Level of Assistance 1: Close supervision (VCs for hand  "placement and sequencing of LLE. Primarily SBA however required CGA to min A for elevation from toilet surface)    Ambulation/Gait Training  Ambulation/Gait Training Performed: Yes  Ambulation/Gait Training 1  Surface 1: Level tile  Device 1: Rolling walker  Gait Support Devices: Gait belt  Assistance 1: Close supervision, Distant supervision  Quality of Gait 1:  (L step to pattern. Reduced B step length (improving with repetition of task). L antalgic pattern. Initial cues for sequencing provided)  Comments/Distance (ft) 1: 200ft and 200ft  Extremity/Trunk Assessments:  RLE   RLE : Within Functional Limits  LLE   LLE : Exceptions to WFL (AROM of L knee between -10 deg extension and 90 deg flexion. 3/5 MMT at L knee)  Treatments:  Therapeutic Exercise  Therapeutic Exercise Performed: Yes  Therapeutic Exercise Activity 1: Instructed pt in L TKA HEP including x10 reps ankle pumps, quad sets, glute sets, heel slides, SAQ, SLR and seated LAQ and knee flexion    Ambulation/Gait Training  Ambulation/Gait Training Performed: Yes  Ambulation/Gait Training 1  Surface 1: Level tile  Device 1: Rolling walker  Gait Support Devices: Gait belt  Assistance 1: Close supervision, Distant supervision  Quality of Gait 1:  (L step to pattern. Reduced B step length (improving with repetition of task). L antalgic pattern. Initial cues for sequencing provided)  Comments/Distance (ft) 1: 200ft and 200ft (2nd trial part of Treatment session)  Stairs  Stairs: Yes  Stairs  Rails 1: Right  Device 1: Railing, Single point cane  Support Devices 1: Gait belt  Assistance 1: Contact guard  Comment/Number of Steps 1: x4 6\" stairs - initial x3 with unilateral HR and SPC. x1 step with SPC and HHA  Outcome Measures:  [facility] Basic Mobility  Turning from your back to your side while in a flat bed without using bedrails: None  Moving from lying on your back to sitting on the side of a flat bed without using bedrails: A little  Moving to and from bed to " chair (including a wheelchair): A little  Standing up from a chair using your arms (e.g. wheelchair or bedside chair): A little  To walk in hospital room: A little  Climbing 3-5 steps with railing: A little  Basic Mobility - Total Score: 19    Encounter Problems       Encounter Problems (Active)       Mobility       LTG - Patient will navigate 4-6 steps with rails/device       Start:  06/18/24    Expected End:  07/02/24       HR and SPC SUP         STG - Patient will ambulate       Start:  06/18/24    Expected End:  07/02/24       Mod Ind using FWW >150ft         ROM       Start:  06/18/24    Expected End:  07/02/24       Pt will complete L knee AROM between 0-100 deg         Strength       Start:  06/18/24    Expected End:  07/02/24       Pt will complete L TKA HEP with ind            PT Transfers       STG - Patient will perform bed mobility       Start:  06/18/24    Expected End:  07/02/24       Mod Ind         STG - Patient will transfer sit to and from stand       Start:  06/18/24    Expected End:  07/02/24       Mod Ind                Education Documentation  Handouts, taught by Chirag Prajapati PT at 6/18/2024 10:20 AM.  Learner: Patient  Readiness: Acceptance  Method: Explanation, Demonstration, Handout  Response: Verbalizes Understanding    Precautions, taught by Chirag Prajapati PT at 6/18/2024 10:20 AM.  Learner: Patient  Readiness: Acceptance  Method: Explanation, Demonstration, Handout  Response: Verbalizes Understanding    Body Mechanics, taught by Cihrag Prajapati PT at 6/18/2024 10:20 AM.  Learner: Patient  Readiness: Acceptance  Method: Explanation, Demonstration, Handout  Response: Verbalizes Understanding    Home Exercise Program, taught by Chirag Prajapati PT at 6/18/2024 10:20 AM.  Learner: Patient  Readiness: Acceptance  Method: Explanation, Demonstration, Handout  Response: Verbalizes Understanding    Mobility Training, taught by Chirag Prajaapti PT at 6/18/2024 10:20  OK.  Learner: Patient  Readiness: Acceptance  Method: Explanation, Demonstration, Handout  Response: Verbalizes Understanding    Education Comments  No comments found.

## 2024-06-19 ENCOUNTER — TELEPHONE (OUTPATIENT)
Dept: ORTHOPEDIC SURGERY | Facility: HOSPITAL | Age: 64
End: 2024-06-19
Payer: COMMERCIAL

## 2024-06-19 ENCOUNTER — HOME CARE VISIT (OUTPATIENT)
Dept: HOME HEALTH SERVICES | Facility: HOME HEALTH | Age: 64
End: 2024-06-19
Payer: COMMERCIAL

## 2024-06-19 VITALS
RESPIRATION RATE: 18 BRPM | OXYGEN SATURATION: 95 % | TEMPERATURE: 98.2 F | SYSTOLIC BLOOD PRESSURE: 132 MMHG | DIASTOLIC BLOOD PRESSURE: 72 MMHG | HEART RATE: 72 BPM

## 2024-06-19 PROCEDURE — G0151 HHCP-SERV OF PT,EA 15 MIN: HCPCS

## 2024-06-19 ASSESSMENT — ENCOUNTER SYMPTOMS
HIGHEST PAIN SEVERITY IN PAST 24 HOURS: 10/10
PAIN LOCATION: LEFT KNEE
PAIN LOCATION - RELIEVING FACTORS: MEDICATION, ICE, REST
PAIN LOCATION - PAIN FREQUENCY: CONSTANT
PERSON REPORTING PAIN: PATIENT
LOWEST PAIN SEVERITY IN PAST 24 HOURS: 5/10
PAIN: 1
PAIN LOCATION - EXACERBATING FACTORS: MOVEMENT, AMBULATION
PAIN LOCATION - PAIN SEVERITY: 8/10
SUBJECTIVE PAIN PROGRESSION: WAXING AND WANING

## 2024-06-19 ASSESSMENT — ACTIVITIES OF DAILY LIVING (ADL)
OASIS_M1830: 05
ENTERING_EXITING_HOME: MINIMUM ASSIST

## 2024-06-19 NOTE — TELEPHONE ENCOUNTER
Spoke with patient during follow-up phone call.  Patient indicates that they are doing well and pain is under control.  Patient denies questions related to discharge instructions or homegoing medications.  Patient is aware of follow up visit with surgeon's office.  Home Care has established a first visit with patient.  He had not had a bowel movement and is taking the prescribed medications, colace and senokot. He is passing gas but no urge for a bowel movement.  He was encouraged to try a suppository and to call if no results. Patient denies addtional questions or concerns at this time and verbalizes understanding to call RN Navigator or Surgeon's office with any new or worsening symptoms.

## 2024-06-20 ENCOUNTER — TELEPHONE (OUTPATIENT)
Dept: ORTHOPEDIC SURGERY | Facility: HOSPITAL | Age: 64
End: 2024-06-20
Payer: COMMERCIAL

## 2024-06-20 ENCOUNTER — HOME CARE VISIT (OUTPATIENT)
Dept: HOME HEALTH SERVICES | Facility: HOME HEALTH | Age: 64
End: 2024-06-20
Payer: COMMERCIAL

## 2024-06-20 VITALS
SYSTOLIC BLOOD PRESSURE: 124 MMHG | RESPIRATION RATE: 20 BRPM | HEART RATE: 68 BPM | OXYGEN SATURATION: 96 % | TEMPERATURE: 98.9 F | DIASTOLIC BLOOD PRESSURE: 70 MMHG

## 2024-06-20 DIAGNOSIS — Z47.1 AFTERCARE FOLLOWING LEFT KNEE JOINT REPLACEMENT SURGERY: Primary | ICD-10-CM

## 2024-06-20 DIAGNOSIS — Z96.652 AFTERCARE FOLLOWING LEFT KNEE JOINT REPLACEMENT SURGERY: Primary | ICD-10-CM

## 2024-06-20 PROCEDURE — G0151 HHCP-SERV OF PT,EA 15 MIN: HCPCS

## 2024-06-20 RX ORDER — CEFADROXIL 500 MG/1
500 CAPSULE ORAL 2 TIMES DAILY
Qty: 20 CAPSULE | Refills: 0 | Status: SHIPPED | OUTPATIENT
Start: 2024-06-20 | End: 2024-06-30

## 2024-06-20 SDOH — HEALTH STABILITY: PHYSICAL HEALTH

## 2024-06-20 SDOH — HEALTH STABILITY: PHYSICAL HEALTH
EXERCISE COMMENTS: PT INSTRUCTED PATIENT IN EXERCISES PER TKA PROTOCOL 1X10:  - ANKLE PUMPS  - GLUT SETS  - QUAD SETS  - HIP ABDUCTION  - SAQ  - LAQ  - SLR  - HEEL SLIDES    BECAUSE OF SIGNIFICANT BLISTERING UNDER HIS WOUND VACC, PT EDUCATED PATIENT TO AVOID AGGRESSIVE

## 2024-06-20 SDOH — HEALTH STABILITY: PHYSICAL HEALTH
EXERCISE COMMENTS: PT INSTRUCTED PATIENT IN EXERCISES PER TKA PROTOCOL 1X10:  - ANKLE PUMPS  - GLUT SETS  - QUAD SETS  - HEELSLIDES  - HIP ABDUCTION  - SAQ  - LAQ  - SLR  - SEATED KNEE FLEXION STRETCH

## 2024-06-20 ASSESSMENT — ENCOUNTER SYMPTOMS
PERSON REPORTING PAIN: PATIENT
PAIN: 1
LOWEST PAIN SEVERITY IN PAST 24 HOURS: 3/10
HIGHEST PAIN SEVERITY IN PAST 24 HOURS: 9/10
SUBJECTIVE PAIN PROGRESSION: WAXING AND WANING

## 2024-06-20 ASSESSMENT — ACTIVITIES OF DAILY LIVING (ADL): AMBULATION ASSISTANCE ON FLAT SURFACES: 1

## 2024-06-20 NOTE — TELEPHONE ENCOUNTER
Home care sent photos of patients knee. Dressing for Prevena is intact but the patient has developed blisters medial and laterally. Blisters are intact but prevena is due to come off Monday and will open the blisters once the dressing is removed. I have notified wound for recommendations for treatment. Dr. Plunkett notified.

## 2024-06-20 NOTE — TELEPHONE ENCOUNTER
The wound care team, Caron Skinner and Lliy Stack reviewed the images and confirmed the blisters to be Tension blisters. The patient should wrap knee area with an ace wrap for compression, ice and elevate. If the blister opens the patient and home care were instructed not to cut skin but to keep the skin flat, clean with soap and water and pat dry. Patient can apply a Vaseline gauze and dry dressing daily or BID if needed. Dr. Carmelita Robles to order antibiotics.

## 2024-06-20 NOTE — TELEPHONE ENCOUNTER
Patient was noted to have a blister adjacent to the Prevena wound VAC.  We will send him an extended course of oral antibiotics.

## 2024-06-24 ENCOUNTER — HOME CARE VISIT (OUTPATIENT)
Dept: HOME HEALTH SERVICES | Facility: HOME HEALTH | Age: 64
End: 2024-06-24
Payer: COMMERCIAL

## 2024-06-24 VITALS
TEMPERATURE: 98.2 F | HEART RATE: 86 BPM | DIASTOLIC BLOOD PRESSURE: 72 MMHG | RESPIRATION RATE: 18 BRPM | OXYGEN SATURATION: 95 % | SYSTOLIC BLOOD PRESSURE: 124 MMHG

## 2024-06-24 PROCEDURE — G0151 HHCP-SERV OF PT,EA 15 MIN: HCPCS

## 2024-06-24 SDOH — HEALTH STABILITY: PHYSICAL HEALTH
EXERCISE COMMENTS: ENT HOW TO USE A SHEET TO DO SO ON HIS OWN.  PATIENT DEMOS IMPROVED L LE STRENGTH, HOWEVER, STRENGTH AND ROM CONTINUE TO LIMITED.  PATIENT DID NOT TOLERATE KNEE FLEXION EXERCISES WELL, REPORTING PAIN AND DIFFICULTY PERFORMING EXERCISES.

## 2024-06-24 SDOH — HEALTH STABILITY: PHYSICAL HEALTH
EXERCISE COMMENTS: PT INSTRUCTED PATIENT IN EXERCISES PER TKA PROTOCOL 1X10:  - ANKLE PUMPS  - GLUT SETS  - QUAD SETS  - HEELSLIDES  - HIP ABDUCTION  - SAQ  - LAQ  - SLR  - SEATED KNEE FLEXION STRETCH    PATIENT CONTINUES TO REQUIRE ASSIST WITH SLR AND PT EDUCATED PATI

## 2024-06-24 ASSESSMENT — ENCOUNTER SYMPTOMS
SUBJECTIVE PAIN PROGRESSION: WAXING AND WANING
LOWEST PAIN SEVERITY IN PAST 24 HOURS: 3/10
PAIN: 1
PERSON REPORTING PAIN: PATIENT
HIGHEST PAIN SEVERITY IN PAST 24 HOURS: 9/10

## 2024-06-25 ENCOUNTER — APPOINTMENT (OUTPATIENT)
Dept: ORTHOPEDIC SURGERY | Facility: CLINIC | Age: 64
End: 2024-06-25
Payer: COMMERCIAL

## 2024-06-26 ENCOUNTER — APPOINTMENT (OUTPATIENT)
Dept: ORTHOPEDIC SURGERY | Facility: CLINIC | Age: 64
End: 2024-06-26
Payer: COMMERCIAL

## 2024-06-26 DIAGNOSIS — Z96.652 AFTERCARE FOLLOWING LEFT KNEE JOINT REPLACEMENT SURGERY: Primary | ICD-10-CM

## 2024-06-26 DIAGNOSIS — E78.49 OTHER HYPERLIPIDEMIA: ICD-10-CM

## 2024-06-26 DIAGNOSIS — I10 BENIGN ESSENTIAL HYPERTENSION: ICD-10-CM

## 2024-06-26 DIAGNOSIS — Z47.1 AFTERCARE FOLLOWING LEFT KNEE JOINT REPLACEMENT SURGERY: Primary | ICD-10-CM

## 2024-06-26 PROCEDURE — 3048F LDL-C <100 MG/DL: CPT

## 2024-06-26 PROCEDURE — 99024 POSTOP FOLLOW-UP VISIT: CPT

## 2024-06-26 PROCEDURE — 3051F HG A1C>EQUAL 7.0%<8.0%: CPT

## 2024-06-26 RX ORDER — FENOFIBRATE 145 MG/1
145 TABLET, FILM COATED ORAL DAILY
Qty: 90 TABLET | Refills: 0 | Status: SHIPPED | OUTPATIENT
Start: 2024-06-26

## 2024-06-26 RX ORDER — OXYCODONE HYDROCHLORIDE 5 MG/1
5-10 TABLET ORAL EVERY 6 HOURS PRN
Qty: 40 TABLET | Refills: 0 | Status: SHIPPED | OUTPATIENT
Start: 2024-06-26 | End: 2024-07-03

## 2024-06-26 RX ORDER — TRAMADOL HYDROCHLORIDE 50 MG/1
50-100 TABLET ORAL EVERY 6 HOURS PRN
Qty: 40 TABLET | Refills: 0 | Status: SHIPPED | OUTPATIENT
Start: 2024-06-26 | End: 2024-07-03

## 2024-06-26 RX ORDER — AMLODIPINE BESYLATE 10 MG/1
10 TABLET ORAL DAILY
Qty: 90 TABLET | Refills: 0 | Status: SHIPPED | OUTPATIENT
Start: 2024-06-26

## 2024-06-26 RX ORDER — CEFADROXIL 500 MG/1
500 CAPSULE ORAL 2 TIMES DAILY
Qty: 20 CAPSULE | Refills: 0 | Status: SHIPPED | OUTPATIENT
Start: 2024-06-26 | End: 2024-07-06

## 2024-06-26 NOTE — PROGRESS NOTES
CATHI Lundy, PANorbertoC  Division of Adult Reconstruction  Phone: [phone]  Fax: [phone]          HPI:  Diagnosis: End stage osteoarthritis left Knee  Procedure Performed: left Total Knee Arthroplasty   Date of Surgery: June 17, 2024    Hammad Blackburn is a pleasant 63 y.o. year-old male here for follow-up of their left total knee arthroplasty by Dr. Plunkett.  The patient is approximately 1 week(s) postop. The patient called the office with concern of blisters lateral to the edges of his Preveena. Wife states the blisters were leaking yellow, red clear drainage.  wound care was consulted and they stated they were tension blisters from the swelling. He was sent an extended course of Cefadroxil to take. He states he is currently taking this The patient has no mechanical symptoms.  The patient has moderate swelling and severe pain. He has a lot of bruising from the knee down into his ankle. The patient is using Oxycodone, Tramadol, and Tylenol for pain control. The patient has been compliant with FERNANDO compression stocking as prescribed. They have been working with home physical therapy and have not progressed to outpatient PT. The patient is ambulatory with a walker. The patient has been compliant with their prescribed ASA for VTE prophylaxis. The patient has had no fall or trauma. The patient denies: fevers, chills, incisional drainage, joint instability, chest or calf pain, shortness of breath.    There has been no interval change in this patient's past medical, surgical, medications, allergies, family history or social history since the most recent visit to a provider within our department. 14 point review of systems was performed, reviewed, and negative except for pertinent positives documented in the history of present illness.    Past Medical History:   Diagnosis Date    Abnormal stress test     followed by heart cath    Anxiety disorder, unspecified     Arthritis     Cardiology follow-up  encounter     Scheduled with JACOBO Lay NP for 4/11/2024    COPD (chronic obstructive pulmonary disease) (Multi)     Coronary atherosclerosis     Diabetes mellitus (Multi)     Elevated prostate specific antigen (PSA)     GERD (gastroesophageal reflux disease)     H/O percutaneous left heart catheterization 03/11/2024    1. Severe branch vessel CAD (D1 and OM1) in a right dominant system.  2.  of small inferior branch of OM1.  3. Elevated LVEDP.  4. No evidence of aortic stenosis.    History of depression     Hyperlipidemia     Hypertension     Nephrolithiasis     Sleep apnea     Tobacco use     Unilateral primary osteoarthritis, left knee      Patient Active Problem List   Diagnosis    Abdominal bloating    Anxiety    Apnea    Benign essential hypertension    COPD with acute bronchitis (Multi)    Colon polyps    Asthmatic bronchitis with acute exacerbation ([facility])    Fatigue    Esophageal reflux    Gastroenteritis    Gout    Cervicalgia    Diabetes (Multi)    Hyperlipidemia    Joint pain    Insomnia    Malaise and fatigue    Lumbosacral spondylosis    Lumbar radiculopathy, chronic    Nicotine dependence, cigarettes, uncomplicated    Obstructive sleep apnea syndrome    Type 2 diabetes mellitus without complications (Multi)    Sciatica of left side    Umbilical hernia    Primary osteoarthritis of left knee    Low back pain    Bilateral swelling of feet    Chronic pain of toes of both feet    Neuropathic pain of both feet    Paresthesia of both feet    Degenerative arthritis of finger, right    Diabetic peripheral neuropathy (Multi)    Fungal skin infection    Generalized pain    Hand arthritis    Idiopathic peripheral neuropathy    Left knee pain    Nasal septal deviation    Obesity (BMI 30-39.9)    Pain of right hand    Dermatitis    Folliculitis    Psoriasis    Sinusitis    Hyperglycemia    Pain, upper back    Lumbar spondylosis    Benign prostatic hyperplasia without lower urinary tract symptoms    Acquired  hypothyroidism    Arteriosclerosis of coronary artery    Cholelithiasis without obstruction    Lateral epicondylitis of left elbow    Maculopapular rash    Medial epicondylitis of elbow    Nonalcoholic steatohepatitis (CELIS)    Urticaria    Abnormal stress test    Tobacco use    Coronary artery disease involving native coronary artery of native heart with angina pectoris (CMS-HCC)    Cough due to bronchospasm     Medication Documentation Review Audit       Reviewed by Lynnette Burns RN (Registered Nurse) on 06/17/24 at 1947      Medication Order Taking? Sig Documenting Provider Last Dose Status   acetaminophen (Tylenol 8 HOUR) 650 mg ER tablet [id number] Yes Take 1 tablet (650 mg) by mouth every 8 hours if needed for mild pain (1 - 3). Do not crush, chew, or split. Historical Provider, MD 6/16/2024 Active   allopurinol (Zyloprim) 300 mg tablet [id number]  TAKE 1 TABLET BY MOUTH EVERY DAY Richard Garcia MD  Active   amLODIPine (Norvasc) 10 mg tablet [id number] Yes TAKE 1 TABLET BY MOUTH EVERY DAY Richard Garcia MD 6/17/2024 Active   aspirin 81 mg chewable tablet [id number] Yes Chew 1 tablet (81 mg) once daily. Alan Davis MD 6/17/2024 Active   aspirin-acetaminophen-caffeine (Excedrin Migraine) 250-250-65 mg tablet [id number] Yes Take 1 tablet by mouth every 6 hours if needed for headaches. Historical Provider, MD Past Week Active   budesonide-formoteroL (Symbicort) 160-4.5 mcg/actuation inhaler [id number] Yes Inhale 2 puffs 2 times a day as needed. Historical Provider, MD 6/16/2024 Active   carvedilol (Coreg) 12.5 mg tablet [id number] Yes Take 1 tablet (12.5 mg) by mouth 2 times a day with meals. Noreen Lay, APRN-CNP 6/17/2024 Active   chlorhexidine (Peridex) 0.12 % solution [id number] Yes Swish for 30 seconds and spit 15mL of solution the night before and morning of surgery Katja Puckett PA-C 6/17/2024 Active   chlorhexidine (Peridex) 0.12 % solution [id number] Yes Swish for 30 seconds and spit  15mL of solution the night before and morning of surgery Katja Puckett PA-C 6/17/2024 Active   co-enzyme Q-10 30 mg capsule [id number] Yes Take 1 capsule (30 mg) by mouth once daily. Historical Provider, MD Past Week Active   cyclobenzaprine (Flexeril) 10 mg tablet [id number] Yes Take 1 tablet (10 mg) by mouth 3 times a day as needed for muscle spasms. Richard Garcia MD 6/16/2024 Active   evolocumab (Repatha SureClick) 140 mg/mL injection [id number] Yes Inject 1 mL (140 mg) under the skin every 14 (fourteen) days. Alan Davis MD Past Week Active   fenofibrate (Tricor) 145 mg tablet [id number] Yes TAKE 1 TABLET (145 MG) BY MOUTH ONCE DAILY. TAKE WITH FOOD. Richard Garcia MD 6/16/2024 Active   fluticasone (Flonase) 50 mcg/actuation nasal spray [id number] Yes Administer 1 spray into each nostril 2 times a day as needed for rhinitis. Richard Garcia MD 6/17/2024 Active   folic acid (Folvite) 1 mg tablet [id number] Yes TAKE 1 TABLET BY MOUTH EVERY DAY Richard Garcia MD Past Week Active   ibuprofen 400 mg tablet [id number] Yes Take 1 tablet (400 mg) by mouth every 6 hours if needed for moderate pain (4 - 6). Historical Provider, MD Past Week Active   metFORMIN (Glucophage) 500 mg tablet [id number] Yes TAKE 2 TABLETS (1,000 MG) BY MOUTH 2 TIMES A DAY. HOLD FOR 48 HOURS DUE TO CONTRAST Richard Garcia MD 6/16/2024 Active   omeprazole (PriLOSEC) 40 mg DR capsule [id number] Yes TAKE 1 CAPSULE BY MOUTH EVERY DAY Stephen Tyler MD 6/17/2024 Active   OneTouch Delica Plus Lancet 30 gauge misc [id number] Yes Apply 1 Lancet topically once daily. Richard Garcia MD 6/17/2024 Active   OneTouch Ultra Test strip [id number] Yes 1 strip 2 times a day. Richard Garcia MD 6/17/2024 Active   rosuvastatin (Crestor) 40 mg tablet [id number] Yes TAKE 1 TABLET BY MOUTH EVERY DAY Richard Garcia MD 6/16/2024 Active   semaglutide (Ozempic) 0.25 mg or 0.5 mg (2 mg/3 mL) pen injector [id number] Yes Inject 0.5 mg  under the skin 1 (one) time per week. Richard Garcia MD Past Week Active   triamcinolone (Kenalog) 0.1 % ointment [id number] Yes Triamcinolone Acetonide 0.1 % External Ointment   Quantity: 80  Refills: 0        Start : 20-Aug-2020   Active Historical Provider, MD Past Week Active   Vascepa 1 gram capsule [id number] Yes TAKE 1 CAPSULE (1 G) BY MOUTH ONCE DAILY. Reyna Portillo PA-C 6/16/2024 Active                  No Known Allergies  Social History     Socioeconomic History    Marital status:      Spouse name: Not on file    Number of children: Not on file    Years of education: Not on file    Highest education level: Not on file   Occupational History    Not on file   Tobacco Use    Smoking status: Every Day     Current packs/day: 0.50     Average packs/day: 0.5 packs/day for 43.5 years (21.7 ttl pk-yrs)     Types: Cigarettes     Start date: 1981    Smokeless tobacco: Never    Tobacco comments:     continues to smoke 3 to 5 cigarettes a day   Vaping Use    Vaping status: Never Used   Substance and Sexual Activity    Alcohol use: Yes     Comment: 4-6 alcoholic beverages a week    Drug use: Never    Sexual activity: Not on file   Other Topics Concern    Not on file   Social History Narrative    Not on file     Social Determinants of Health     Financial Resource Strain: Low Risk  (6/18/2024)    Overall Financial Resource Strain (CARDIA)     Difficulty of Paying Living Expenses: Not hard at all   Food Insecurity: No Food Insecurity (6/18/2024)    Hunger Vital Sign     Worried About Running Out of Food in the Last Year: Never true     Ran Out of Food in the Last Year: Never true   Transportation Needs: No Transportation Needs (6/19/2024)    OASIS : Transportation     Lack of Transportation (Medical): No     Lack of Transportation (Non-Medical): No     Patient Unable or Declines to Respond: No   Physical Activity: Not on file   Stress: Not on file   Social Connections: Feeling Socially Integrated  (6/19/2024)    OASIS : Social Isolation     Frequency of experiencing loneliness or isolation: Never   Intimate Partner Violence: Not At Risk (6/18/2024)    Humiliation, Afraid, Rape, and Kick questionnaire     Fear of Current or Ex-Partner: No     Emotionally Abused: No     Physically Abused: No     Sexually Abused: No   Housing Stability: Low Risk  (6/18/2024)    Housing Stability Vital Sign     Unable to Pay for Housing in the Last Year: No     Number of Places Lived in the Last Year: 1     Unstable Housing in the Last Year: No     Past Surgical History:   Procedure Laterality Date    ADENOIDECTOMY      APPENDECTOMY      CARDIAC CATHETERIZATION N/A 03/11/2024    Procedure: Left Heart Cath with Coronary Angiography and LV;  Surgeon: Alan Davis MD;  Location: [facility];  Service: Cardiovascular;  Laterality: N/A;  Scheduled 3/11/24 @ 10:30 am    COLONOSCOPY      HERNIA REPAIR      KNEE SURGERY      Knee Arthroscopy With Medial And Lateral Meniscus Repair    TONSILLECTOMY         Physical Exam:  General: Well-appearing male is alert and oriented x 3 and appears comfortable. NAD. Pleasant and cooperative.  Mood: Euthymic  Respirations: non-labored  Gait: Slight Antalgic  Assistive Device: walker. Coordination and balance intact.    Left Knee:  There are four total tension blisters. 1 on the medial aspect of the knee that measures 4.5 cm and 3 on the lateral aspect of the knee measuring from proximal to distal 2.5 cm, 4 cm, and 3.5 cm. The blisters have popped and they are healing well with no drainage or pus. The skin has desquamated off.     Wound: Incision is healing well, midline with no signs of surrounding infection, erythema, fluctuance, dehiscence, drainage, or suture abscess. There is mild warmth and effusion about the knee, both consistent with the normal post-operative healing.   Range of motion: 10 - 87  Stable to varus and valgus stress at 0 and 30 degrees.   Patella tracks  midline.  Calf: No swelling or tenderness. Lower extremity warm and well perfused.  Able to dorsi-flex and plantar-flex the ankle with appropriate strength. Able to wiggle all toes.   The foot is warm and well perfused with palpable pulses.   Sensation is intact to light touch.   Pulses: Palpable DP pulse    Imaging:  No radiographs obtained today.    Impression/Plan:  Hammad Blackburn is doing well and progressing well approximately 1 week(s) s/p left total knee arthroplasty. I am satisfied with the patient's recovery to this point.     A thorough discussion was had with the patient concerning the postoperative course and the patient is in agreement with the plan.  A new Mepiliex dressing was applied today, please leave on for 7 days. Blisters were covered with xeroform, 4x4 pads, kerlix, and an ACE.  Cefadroxil 500mg BID for 10 days was sent to pharmacy.  Please focus on icing and elevating the leg with a pillow at the ankle to aid in the swelling and bruising.  Continued physical therapy with gait training to improve strength and stamina. Progress off support as tolerated. At this time, you may gradually increase your activities and get back to a normal, low-impact lifestyle. Please avoid running, jumping, and heavy lifting. No kneeling on the operative knee until 3 months post op, at that time one should put a pillow, pad, or blanket under the knee when kneeling.  Continue with current pain regimen of Oxycodone, Tramadol, and Tylenol. We discussed strategies to wean off of opioid medications as tolerated. Transition to tylenol. Can incorporate NSAIDs after completion of prophylactic anticoagulation. Due to the nature of the surgery and the necessary post-operative rehabilitation, the patient will require more than 30 morphine MEQ per day for 6 days. The patient occasionally patient rates his pain a 9 or 10 on the pain scale. I have personally reviewed the OARRS report for this patient. This report is scanned into  the electronic medical record. I have considered the risks of abuse, dependence, addiction and diversion.   No swimming or tub bathing until 3 months post op.  Do not visit dentist until 3 months after surgery unless it is an emergency. Reviewed the need for prophylactic antibiotics prior to any dental or other invasive procedures. Patient understands that their dentist or myself may prescribe.  Follow-up in 2 weeks with radiographs or sooner if there are any concerns.     All questions answered.    Follow-up 2 weeks with x-rays at next visit.    CATHI Ordaz, PANorbertoC  Orthopedic Physician Assisant  Division of Adult Reconstruction  Department of Orthopaedics  [place] [id number]

## 2024-06-28 ENCOUNTER — HOME CARE VISIT (OUTPATIENT)
Dept: HOME HEALTH SERVICES | Facility: HOME HEALTH | Age: 64
End: 2024-06-28
Payer: COMMERCIAL

## 2024-06-28 ENCOUNTER — APPOINTMENT (OUTPATIENT)
Dept: CARDIOLOGY | Facility: CLINIC | Age: 64
End: 2024-06-28
Payer: COMMERCIAL

## 2024-06-28 VITALS
OXYGEN SATURATION: 93 % | DIASTOLIC BLOOD PRESSURE: 63 MMHG | RESPIRATION RATE: 16 BRPM | SYSTOLIC BLOOD PRESSURE: 140 MMHG | HEART RATE: 63 BPM | TEMPERATURE: 98.4 F

## 2024-06-28 PROCEDURE — G0151 HHCP-SERV OF PT,EA 15 MIN: HCPCS

## 2024-06-28 SDOH — HEALTH STABILITY: PHYSICAL HEALTH
EXERCISE COMMENTS: F    PT UPRGRADED HEP TO INCLUDE STANDING EXERCISES AND INCREASED NUMBER OF REPS TO 15.  PATIENT TOLERATED EXERCISES MUCH BETTER TODAY.

## 2024-06-28 SDOH — HEALTH STABILITY: PHYSICAL HEALTH
EXERCISE COMMENTS: PT INSTRUCTED PATIENT IN EXERCISES PER TKA PROTOCOL 1X15:  - ANKLE PUMPS  - GLUT SETS  - QUAD SETS  - HEELSLIDES  - HIP ABDUCTION  - SAQ  - LAQ  - SLR  - SEATED KNEE FLEXION STRETCH  - STANDING KNEE FLEXION  - STANDING KNEE EXTENSION  - STANDING PF/D

## 2024-06-28 ASSESSMENT — ENCOUNTER SYMPTOMS
HIGHEST PAIN SEVERITY IN PAST 24 HOURS: 8/10
SUBJECTIVE PAIN PROGRESSION: WAXING AND WANING
PAIN: 1
PERSON REPORTING PAIN: PATIENT
LOWEST PAIN SEVERITY IN PAST 24 HOURS: 2/10

## 2024-07-02 ENCOUNTER — HOME CARE VISIT (OUTPATIENT)
Dept: HOME HEALTH SERVICES | Facility: HOME HEALTH | Age: 64
End: 2024-07-02
Payer: COMMERCIAL

## 2024-07-02 VITALS
OXYGEN SATURATION: 97 % | DIASTOLIC BLOOD PRESSURE: 76 MMHG | HEART RATE: 63 BPM | RESPIRATION RATE: 16 BRPM | SYSTOLIC BLOOD PRESSURE: 136 MMHG | TEMPERATURE: 98.3 F

## 2024-07-02 DIAGNOSIS — E11.9 TYPE 2 DIABETES MELLITUS WITHOUT COMPLICATION, WITHOUT LONG-TERM CURRENT USE OF INSULIN (MULTI): ICD-10-CM

## 2024-07-02 PROCEDURE — G0151 HHCP-SERV OF PT,EA 15 MIN: HCPCS

## 2024-07-02 RX ORDER — SEMAGLUTIDE 0.68 MG/ML
0.5 INJECTION, SOLUTION SUBCUTANEOUS
Qty: 3 ML | Refills: 0 | Status: SHIPPED | OUTPATIENT
Start: 2024-07-02

## 2024-07-02 ASSESSMENT — ENCOUNTER SYMPTOMS
PERSON REPORTING PAIN: PATIENT
PAIN: 1
HIGHEST PAIN SEVERITY IN PAST 24 HOURS: 8/10
LOWEST PAIN SEVERITY IN PAST 24 HOURS: 4/10

## 2024-07-03 DIAGNOSIS — Z96.652 AFTERCARE FOLLOWING LEFT KNEE JOINT REPLACEMENT SURGERY: ICD-10-CM

## 2024-07-03 DIAGNOSIS — Z47.1 AFTERCARE FOLLOWING LEFT KNEE JOINT REPLACEMENT SURGERY: ICD-10-CM

## 2024-07-03 RX ORDER — TRAMADOL HYDROCHLORIDE 50 MG/1
50-100 TABLET ORAL EVERY 6 HOURS PRN
Qty: 40 TABLET | Refills: 0 | Status: SHIPPED | OUTPATIENT
Start: 2024-07-03 | End: 2024-07-10

## 2024-07-03 RX ORDER — OXYCODONE HYDROCHLORIDE 5 MG/1
5-10 TABLET ORAL EVERY 6 HOURS PRN
Qty: 40 TABLET | Refills: 0 | Status: SHIPPED | OUTPATIENT
Start: 2024-07-03 | End: 2024-07-10

## 2024-07-05 ENCOUNTER — HOME CARE VISIT (OUTPATIENT)
Dept: HOME HEALTH SERVICES | Facility: HOME HEALTH | Age: 64
End: 2024-07-05
Payer: COMMERCIAL

## 2024-07-05 VITALS
SYSTOLIC BLOOD PRESSURE: 126 MMHG | TEMPERATURE: 98.2 F | HEART RATE: 60 BPM | DIASTOLIC BLOOD PRESSURE: 76 MMHG | RESPIRATION RATE: 18 BRPM | OXYGEN SATURATION: 97 %

## 2024-07-05 PROCEDURE — RXMED WILLOW AMBULATORY MEDICATION CHARGE

## 2024-07-05 PROCEDURE — G0151 HHCP-SERV OF PT,EA 15 MIN: HCPCS

## 2024-07-05 SDOH — HEALTH STABILITY: PHYSICAL HEALTH: EXERCISE COMMENTS: ING KNEE FLEXION  - STANDING ALTERNATING HIP FLEXION  - STANDING PF/DF

## 2024-07-05 SDOH — HEALTH STABILITY: PHYSICAL HEALTH
EXERCISE COMMENTS: PT INSTRUCTED PATIENT IN EXERCISES PER TKA PROTOCOL 1X15:  - ANKLE PUMPS  - GLUT SETS  - QUAD SETS  - HEELSLIDES  - HIP ABDUCTION  - SAQ  - LAQ  - SLR  - SEATED KNEE FLEXION STRETCH  - SEATED KNEE EXTENSION STRETCH  - STANDING KNEE EXTENSION  - STAND

## 2024-07-05 ASSESSMENT — ENCOUNTER SYMPTOMS
SUBJECTIVE PAIN PROGRESSION: WAXING AND WANING
PERSON REPORTING PAIN: PATIENT
PAIN: 1
HIGHEST PAIN SEVERITY IN PAST 24 HOURS: 7/10
LOWEST PAIN SEVERITY IN PAST 24 HOURS: 5/10

## 2024-07-05 ASSESSMENT — ACTIVITIES OF DAILY LIVING (ADL)
HOME_HEALTH_OASIS: 01
OASIS_M1830: 04

## 2024-07-08 ENCOUNTER — PHARMACY VISIT (OUTPATIENT)
Dept: PHARMACY | Facility: CLINIC | Age: 64
End: 2024-07-08
Payer: MEDICARE

## 2024-07-10 ENCOUNTER — HOSPITAL ENCOUNTER (OUTPATIENT)
Dept: RADIOLOGY | Facility: CLINIC | Age: 64
Discharge: HOME | End: 2024-07-10
Payer: COMMERCIAL

## 2024-07-10 ENCOUNTER — APPOINTMENT (OUTPATIENT)
Dept: ORTHOPEDIC SURGERY | Facility: CLINIC | Age: 64
End: 2024-07-10
Payer: COMMERCIAL

## 2024-07-10 DIAGNOSIS — Z96.652 S/P TOTAL KNEE ARTHROPLASTY, LEFT: Primary | ICD-10-CM

## 2024-07-10 DIAGNOSIS — Z96.652 S/P TOTAL KNEE ARTHROPLASTY, LEFT: ICD-10-CM

## 2024-07-10 PROCEDURE — 99024 POSTOP FOLLOW-UP VISIT: CPT

## 2024-07-10 PROCEDURE — 4004F PT TOBACCO SCREEN RCVD TLK: CPT

## 2024-07-10 PROCEDURE — 3048F LDL-C <100 MG/DL: CPT

## 2024-07-10 PROCEDURE — 73562 X-RAY EXAM OF KNEE 3: CPT | Mod: LEFT SIDE | Performed by: RADIOLOGY

## 2024-07-10 PROCEDURE — 3051F HG A1C>EQUAL 7.0%<8.0%: CPT

## 2024-07-10 PROCEDURE — 73562 X-RAY EXAM OF KNEE 3: CPT | Mod: LT

## 2024-07-10 RX ORDER — GLYCERIN 1 G/1
1 SUPPOSITORY RECTAL DAILY PRN
Qty: 12 SUPPOSITORY | Refills: 0 | Status: SHIPPED | OUTPATIENT
Start: 2024-07-10 | End: 2024-07-22

## 2024-07-10 RX ORDER — PROMETHAZINE HYDROCHLORIDE 12.5 MG/1
12.5 TABLET ORAL EVERY 6 HOURS PRN
Qty: 30 TABLET | Refills: 0 | Status: SHIPPED | OUTPATIENT
Start: 2024-07-10 | End: 2024-07-18

## 2024-07-10 NOTE — PROGRESS NOTES
CATHI Lundy, PANorbertoC  Division of Adult Reconstruction  Phone: [phone]  Fax: [phone]          HPI:  Diagnosis: End stage osteoarthritis left Knee  Procedure Performed: left Total Knee Arthroplasty   Date of Surgery: June 17, 2024    Hammad Blakcburn is a pleasant 63 y.o. year-old male here for follow-up of their left total knee arthroplasty by Dr. Plunkett.  The patient is approximately 2 week(s) postop. Last visit he was evaluated for tension blisters around the incision. He was sent an extended course of Cefadroxil to take, he has finished this prescription. The patient has no mechanical symptoms.  The patient has moderate swelling and moderate pain. The bruising of the left lower leg as improved greatly. The patient is using Oxycodone, Tramadol, and Tylenol for pain control. He states he occasionally has nausea from the pain and is also dealing with constipation. The patient has been compliant with FERNANDO compression stocking as prescribed. They have been working with home physical therapy and start outpatient PT tomorrow. The patient is ambulatory with a cane. The patient has been compliant with their prescribed ASA for VTE prophylaxis. The patient has had no fall or trauma. The patient denies: fevers, chills, incisional drainage, joint instability, chest or calf pain, shortness of breath.    There has been no interval change in this patient's past medical, surgical, medications, allergies, family history or social history since the most recent visit to a provider within our department. 14 point review of systems was performed, reviewed, and negative except for pertinent positives documented in the history of present illness.    Past Medical History:   Diagnosis Date    Abnormal stress test     followed by heart cath    Anxiety disorder, unspecified     Arthritis     Cardiology follow-up encounter     Scheduled with JACOBO Lay NP for 4/11/2024    COPD (chronic obstructive pulmonary disease)  (Multi)     Coronary atherosclerosis     Diabetes mellitus (Multi)     Elevated prostate specific antigen (PSA)     GERD (gastroesophageal reflux disease)     H/O percutaneous left heart catheterization 03/11/2024    1. Severe branch vessel CAD (D1 and OM1) in a right dominant system.  2.  of small inferior branch of OM1.  3. Elevated LVEDP.  4. No evidence of aortic stenosis.    History of depression     Hyperlipidemia     Hypertension     Nephrolithiasis     Sleep apnea     Tobacco use     Unilateral primary osteoarthritis, left knee      Patient Active Problem List   Diagnosis    Abdominal bloating    Anxiety    Apnea    Benign essential hypertension    COPD with acute bronchitis (Multi)    Colon polyps    Asthmatic bronchitis with acute exacerbation ([facility])    Fatigue    Esophageal reflux    Gastroenteritis    Gout    Cervicalgia    Diabetes (Multi)    Hyperlipidemia    Joint pain    Insomnia    Malaise and fatigue    Lumbosacral spondylosis    Lumbar radiculopathy, chronic    Nicotine dependence, cigarettes, uncomplicated    Obstructive sleep apnea syndrome    Type 2 diabetes mellitus without complications (Multi)    Sciatica of left side    Umbilical hernia    Primary osteoarthritis of left knee    Low back pain    Bilateral swelling of feet    Chronic pain of toes of both feet    Neuropathic pain of both feet    Paresthesia of both feet    Degenerative arthritis of finger, right    Diabetic peripheral neuropathy (Multi)    Fungal skin infection    Generalized pain    Hand arthritis    Idiopathic peripheral neuropathy    Left knee pain    Nasal septal deviation    Obesity (BMI 30-39.9)    Pain of right hand    Dermatitis    Folliculitis    Psoriasis    Sinusitis    Hyperglycemia    Pain, upper back    Lumbar spondylosis    Benign prostatic hyperplasia without lower urinary tract symptoms    Acquired hypothyroidism    Arteriosclerosis of coronary artery    Cholelithiasis without obstruction    Lateral  epicondylitis of left elbow    Maculopapular rash    Medial epicondylitis of elbow    Nonalcoholic steatohepatitis (CELIS)    Urticaria    Abnormal stress test    Tobacco use    Coronary artery disease involving native coronary artery of native heart with angina pectoris (CMS-HCC)    Cough due to bronchospasm     Medication Documentation Review Audit       Reviewed by Linda Meza MA (Medical Assistant) on 07/10/24 at 1121      Medication Order Taking? Sig Documenting Provider Last Dose Status   acetaminophen (Tylenol) 500 mg tablet [id number]  Take 2 tablets (1,000 mg) by mouth every 8 hours for 20 days. Jung Plunkett MD   24 2359   allopurinol (Zyloprim) 300 mg tablet [id number] No TAKE 1 TABLET BY MOUTH EVERY DAY Richard Garcia MD 2024 Active   amLODIPine (Norvasc) 10 mg tablet [id number]  TAKE 1 TABLET BY MOUTH EVERY DAY Richard Garcia MD  Active   aspirin 81 mg chewable tablet [id number]  Chew 1 tablet (81 mg) 2 times a day. Jung Plunkett MD  Active   budesonide-formoteroL (Symbicort) 160-4.5 mcg/actuation inhaler [id number] No Inhale 2 puffs 2 times a day as needed. Historical Provider, MD 2024 Active   carvedilol (Coreg) 12.5 mg tablet [id number] No Take 1 tablet (12.5 mg) by mouth 2 times a day with meals. Noreen Lay, APRN-CNP 2024 Active   cefadroxil (Duricef) 500 mg capsule [id number]  Take 1 capsule (500 mg) by mouth 2 times a day for 10 days. Cyrstal Martins PA-C   24 2359   co-enzyme Q-10 30 mg capsule [id number] No Take 1 capsule by mouth once daily. Historical Provider, MD Past Week Active   cyclobenzaprine (Flexeril) 10 mg tablet [id number] No Take 1 tablet (10 mg) by mouth 3 times a day as needed for muscle spasms. Richard Garcia MD 2024 Active   docusate sodium (Colace) 100 mg capsule [id number]  Take 1 capsule (100 mg) by mouth 2 times a day for 15 days. Jung Plunkett MD   24 1594   evolocumab (Repatha  SureClick) 140 mg/mL injection [id number] No Inject 1 mL (140 mg) under the skin every 14 (fourteen) days. Alan Davis MD Past Week Active   fenofibrate (Tricor) 145 mg tablet [id number]  TAKE 1 TABLET (145 MG) BY MOUTH ONCE DAILY. TAKE WITH FOOD. Rcihard Garcia MD  Active   fluticasone (Flonase) 50 mcg/actuation nasal spray [id number] No Administer 1 spray into each nostril 2 times a day as needed for rhinitis. Richard Garcia MD 6/17/2024 Active   folic acid (Folvite) 1 mg tablet [id number] No TAKE 1 TABLET BY MOUTH EVERY DAY Richard Garcia MD Past Week Active   metFORMIN (Glucophage) 500 mg tablet [id number] No TAKE 2 TABLETS (1,000 MG) BY MOUTH 2 TIMES A DAY. HOLD FOR 48 HOURS DUE TO CONTRAST   Patient taking differently: TAKE 2 TABLETS (1,000 MG) BY MOUTH 2 TIMES A DAY. HOLD FOR 48 HOURS DUE TO CONTRAST    Patient taking 1 in AM, 2 at dinner, 1 at bedtime    Richard Garcia MD 6/16/2024 Active   ondansetron (Zofran) 4 mg tablet [id number]  Take 1 tablet (4 mg) by mouth every 8 hours if needed for nausea or vomiting. Jung Plunkett MD  Active   OneTouch Delica Plus Lancet 30 gauge misc [id number] No Apply 1 Lancet topically once daily. Richard Garcia MD 6/17/2024 Active   OneTouch Ultra Test strip [id number] No 1 strip 2 times a day. Richard Garcia MD 6/17/2024 Active   oxyCODONE (Roxicodone) 5 mg immediate release tablet [id number]  Take 1-2 tablets (5-10 mg) by mouth every 6 hours if needed for severe pain (7 - 10) for up to 7 days. Crystal Martins PA-C  Active   Ozempic 0.25 mg or 0.5 mg (2 mg/3 mL) pen injector [id number]  INJECT 0.5 MG UNDER THE SKIN 1 (ONE) TIME PER WEEK. Richard Garcia MD  Active   pantoprazole (ProtoNix) 40 mg EC tablet [id number]  Take 1 tablet (40 mg) by mouth once daily in the morning. Take before meals. Do not crush, chew, or split. Jung Plunkett MD  Active   rosuvastatin (Crestor) 40 mg tablet [id number] No TAKE 1 TABLET BY MOUTH EVERY DAY  Richard Garcia MD 2024 Active   semaglutide (OZEMPIC) 1 mg/dose (4 mg/3 mL) pen injector [id number]  Inject 1 mg under the skin 1 (one) time per week. Richard Garcia MD  Active   sennosides (Senokot) 8.6 mg tablet [id number]  Take 1 tablet (8.6 mg) by mouth once daily for 15 days. Jung Plunkett MD   24 2359   traMADol (Ultram) 50 mg tablet [id number]  Take 1-2 tablets ( mg) by mouth every 6 hours if needed for severe pain (7 - 10) for up to 7 days. Crystal Martins PA-C  Active   triamcinolone (Kenalog) 0.1 % ointment [id number] No Triamcinolone Acetonide 0.1 % External Ointment   Quantity: 80  Refills: 0        Start : 20-Aug-2020   Active Historical MD Zev Past Week Active   Vascepa 1 gram capsule [id number] No TAKE 1 CAPSULE (1 G) BY MOUTH ONCE DAILY. Reyna Portillo PA-C 2024 Active                  No Known Allergies  Social History     Socioeconomic History    Marital status:      Spouse name: Not on file    Number of children: Not on file    Years of education: Not on file    Highest education level: Not on file   Occupational History    Not on file   Tobacco Use    Smoking status: Every Day     Current packs/day: 0.50     Average packs/day: 0.5 packs/day for 43.5 years (21.8 ttl pk-yrs)     Types: Cigarettes     Start date:     Smokeless tobacco: Never    Tobacco comments:     continues to smoke 3 to 5 cigarettes a day   Vaping Use    Vaping status: Never Used   Substance and Sexual Activity    Alcohol use: Yes     Comment: 4-6 alcoholic beverages a week    Drug use: Never    Sexual activity: Not on file   Other Topics Concern    Not on file   Social History Narrative    Not on file     Social Determinants of Health     Financial Resource Strain: Low Risk  (2024)    Overall Financial Resource Strain (CARDIA)     Difficulty of Paying Living Expenses: Not hard at all   Food Insecurity: No Food Insecurity (2024)    Hunger Vital Sign      Worried About Running Out of Food in the Last Year: Never true     Ran Out of Food in the Last Year: Never true   Transportation Needs: No Transportation Needs (7/5/2024)    OASIS : Transportation     Lack of Transportation (Medical): No     Lack of Transportation (Non-Medical): No     Patient Unable or Declines to Respond: No   Physical Activity: Not on file   Stress: Not on file   Social Connections: Feeling Socially Integrated (7/5/2024)    OASIS : Social Isolation     Frequency of experiencing loneliness or isolation: Never   Intimate Partner Violence: Not At Risk (6/18/2024)    Humiliation, Afraid, Rape, and Kick questionnaire     Fear of Current or Ex-Partner: No     Emotionally Abused: No     Physically Abused: No     Sexually Abused: No   Housing Stability: Low Risk  (6/18/2024)    Housing Stability Vital Sign     Unable to Pay for Housing in the Last Year: No     Number of Places Lived in the Last Year: 1     Unstable Housing in the Last Year: No     Past Surgical History:   Procedure Laterality Date    ADENOIDECTOMY      APPENDECTOMY      CARDIAC CATHETERIZATION N/A 03/11/2024    Procedure: Left Heart Cath with Coronary Angiography and LV;  Surgeon: Alan Davis MD;  Location: [facility]iac [facility];  Service: Cardiovascular;  Laterality: N/A;  Scheduled 3/11/24 @ 10:30 am    COLONOSCOPY      HERNIA REPAIR      KNEE SURGERY      Knee Arthroscopy With Medial And Lateral Meniscus Repair    TONSILLECTOMY         Physical Exam:  General: Well-appearing male is alert and oriented x 3 and appears comfortable. NAD. Pleasant and cooperative.  Mood: Euthymic  Respirations: non-labored  Gait: Slight Antalgic  Assistive Device: Cane. Coordination and balance intact.    Wound: Incision is healing well, midline with no signs of surrounding infection, erythema, fluctuance, dehiscence, drainage, or suture abscess. There is mild warmth and effusion about the knee, both consistent with the normal post-operative  healing. Tension blisters have completely healed.  Range of motion: 12 - 92  Stable to varus and valgus stress at 0 and 30 degrees.   Patella tracks midline.  Calf: No swelling or tenderness. Lower extremity warm and well perfused.  Able to dorsi-flex and plantar-flex the ankle with appropriate strength. Able to wiggle all toes.   The foot is warm and well perfused with palpable pulses.   Sensation is intact to light touch.   Pulses: Palpable DP pulse    Imaging:  Radiographs of the operative knee were independently obtained and reviewed in clinic. They demonstrate the implant is well fixed and well positioned. No radiographic evidence of sandeep-implant fracture, lucency or dislocation. Patella is tracking centrally.    Impression/Plan:  Hammad Blackburn is doing well and progressing well approximately 2 week(s) s/p left total knee arthroplasty. I am satisfied with the patient's recovery to this point.     A thorough discussion was had with the patient concerning the postoperative course and the patient is in agreement with the plan.  Glycerin suppository and Phenergan were sent to pharmacy for constipation and nausea.   Continued physical therapy with gait training to improve strength and stamina. Progress off support as tolerated. At this time, you may gradually increase your activities and get back to a normal, low-impact lifestyle. Please avoid running, jumping, and heavy lifting. No kneeling on the operative knee until 3 months post op, at that time one should put a pillow, pad, or blanket under the knee when kneeling.  Continue with current pain regimen of Oxycodone, Tramadol, and Tylenol. We discussed strategies to wean off of opioid medications as tolerated. Transition to tylenol. Can incorporate NSAIDs after completion of prophylactic anticoagulation.   No swimming or tub bathing until 3 months post op.  Do not visit dentist until 3 months after surgery unless it is an emergency. Reviewed the need for  prophylactic antibiotics prior to any dental or other invasive procedures. Patient understands that their dentist or myself may prescribe.  Follow-up in 2 weeks without radiographs or sooner if there are any concerns.     All questions answered.    Follow-up 2 weeks without x-rays at next visit.    CATHI Ordaz, PANorbertoC  Orthopedic Physician Assisant  Division of Adult Reconstruction  Department of Orthopaedics  [place] [address]

## 2024-07-11 ENCOUNTER — EVALUATION (OUTPATIENT)
Dept: PHYSICAL THERAPY | Facility: CLINIC | Age: 64
End: 2024-07-11
Payer: COMMERCIAL

## 2024-07-11 DIAGNOSIS — M17.12 ARTHRITIS OF LEFT KNEE: ICD-10-CM

## 2024-07-11 PROCEDURE — 97140 MANUAL THERAPY 1/> REGIONS: CPT | Mod: GP

## 2024-07-11 PROCEDURE — 97162 PT EVAL MOD COMPLEX 30 MIN: CPT | Mod: GP

## 2024-07-11 PROCEDURE — 97110 THERAPEUTIC EXERCISES: CPT | Mod: GP

## 2024-07-11 SDOH — ECONOMIC STABILITY: GENERAL: QUALITY OF LIFE: FAIR

## 2024-07-11 ASSESSMENT — ENCOUNTER SYMPTOMS
ALLEVIATING FACTORS: REST
PAIN SCALE: 5
ALLEVIATING FACTORS: ICE
QUALITY: TIGHT
QUALITY: DISCOMFORT
EXACERBATED BY: MOVEMENT
ALLEVIATING FACTORS: SUPPORT
QUALITY: RADIATING
ALLEVIATING FACTORS: RELAXATION
PAIN SCALE AT HIGHEST: 9
ALLEVIATING FACTORS: MEDICATIONS
ALLEVIATING FACTORS: CHANGE IN POSITION
PAIN SCALE AT LOWEST: 4

## 2024-07-11 NOTE — PROGRESS NOTES
Physical Therapy Evaluation and Treatment     Patient Name: Hammad Blackburn  MRN: [id number]  Visit Date: 2024   Time in - 10:30am  Time out - 11:45am    Visit # 1 of   Visits/Dates Authorized: medical review after 25th visit    Current Problem:   Problem List Items Addressed This Visit    None  Visit Diagnoses         Codes    Arthritis of left knee     M17.12    Relevant Orders    Follow Up In Physical Therapy          Precautions:   TKR precautions    Subjective Evaluation    History of Present Illness  Date of onset: 2024  Date of surgery: 2024  Mechanism of injury: Left TKR 24  Dr Plunkett  Home health PT 2x/wk x 3wks  Next MD appt 24    Quality of life: fair    Pain  Current pain ratin  At best pain ratin  At worst pain ratin  Location: anterior Left knee, incision site  Quality: tight, discomfort and radiating  Relieving factors: medications, change in position, rest, support, ice and relaxation  Aggravating factors: movement  Progression: no change    Social Support  Lives in: multiple-level home  Lives with: spouse    Hand dominance: right    Treatments  Current treatment: home therapy  Patient Goals  Patient goals for therapy: decreased edema, increased strength, independence with ADLs/IADLs, decreased pain, improved balance, increased motion and return to sport/leisure activities  Patient goal: golf and bowling                 Treatments:     Therapeutic Exercise:   Reviewed HH HEP    Gait Training:   VC for quad cane use    Manual Therapy:   PF mobs (gr 2-3)  PROM Left knee flex/ext  Passive left hs/gas/solo    Modalities:   Cold pack applied to knee x 15 min          Moderate complexity due to patient's clinical presentation being evolving with changing characteristics, with comorbidities/complexities to include left le ROM, strength and flexibility, all of which may negatively impact rehab tolerance and progression.     Assessment:  Patient progressing well s/p L TKR  6/17/24  Presents with limited and guarded AROM left knee > ankle/hip  + redness, pitting edema L knee/LL  Limited PF mobility all plane  Increased warmth to touch  Limited nora for L LE wt bearing  Indep knowledge with HEP and home modalities    Post-Treatment Pain:  2-3/10     PLAN - 2x/wk x 6-8wks    Goals: In 6-8 Weeks:   Patient independent with prescribed HEP (ROM, strength, jt mobility, home modalities)  Pt Education - Independent postural and  awareness, Independent community amb)  AROM - Increased flex = 100-110  ext=0 (involved vs uninvolved)  Strength - WFL (4+5/5) L=R knee  Functional Level - reported % (hobbies/work/recreation - bowling/golf)  Pain - Verbal Pain 0/10 with all functional activities

## 2024-07-15 DIAGNOSIS — E11.9 TYPE 2 DIABETES MELLITUS WITHOUT COMPLICATION, WITHOUT LONG-TERM CURRENT USE OF INSULIN (MULTI): ICD-10-CM

## 2024-07-15 RX ORDER — LANCETS 33 GAUGE
1 EACH MISCELLANEOUS DAILY
Qty: 100 EACH | Refills: 0 | Status: SHIPPED | OUTPATIENT
Start: 2024-07-15

## 2024-07-16 ENCOUNTER — TREATMENT (OUTPATIENT)
Dept: PHYSICAL THERAPY | Facility: CLINIC | Age: 64
End: 2024-07-16
Payer: COMMERCIAL

## 2024-07-16 DIAGNOSIS — M17.12 ARTHRITIS OF LEFT KNEE: ICD-10-CM

## 2024-07-16 PROCEDURE — 97110 THERAPEUTIC EXERCISES: CPT | Mod: GP

## 2024-07-16 PROCEDURE — 97140 MANUAL THERAPY 1/> REGIONS: CPT | Mod: GP

## 2024-07-16 ASSESSMENT — PAIN - FUNCTIONAL ASSESSMENT: PAIN_FUNCTIONAL_ASSESSMENT: 0-10

## 2024-07-16 ASSESSMENT — PAIN SCALES - GENERAL: PAINLEVEL_OUTOF10: 7

## 2024-07-16 ASSESSMENT — PAIN DESCRIPTION - DESCRIPTORS: DESCRIPTORS: ACHING;DISCOMFORT;SORE

## 2024-07-16 NOTE — PROGRESS NOTES
"Physical Therapy Treatment    Patient Name: Hammad Blackburn  MRN: [id number]  Today's Date: 7/16/2024       Insurance:  Visit number: 2 of 25  Authorization info: UMR - NO AUTH / [place] REVIEW AFTER 25TH VISIT / 100% COVERAGE thru 12/31/24 / DEDUCT $3375 - MET / OOP $$4875 - MET / PER UMR W/S / ds 7/10/24.     Insurance Type: Payor: [employer] / Plan: [employer] / Product Type: *No Product type* /     Current Problem   1. Arthritis of left knee  Follow Up In Physical Therapy          Subjective   General   Chief complaint- pain left knee  Limited ROM  Reason for Referral: tkr left  Referred By: faith  Precautions: TKR precautions     Pain 3-7/10  Pain Assessment: 0-10  0-10 (Numeric) Pain Score: 7  Pain Type: Acute pain  Pain Location: Knee  Pain Orientation: Anterior  Pain Descriptors: Aching, Discomfort, Sore  Post Treatment Pain Level 4    Objective   AROM - Left knee flex 75     Knee ext -12  PROM - flex - 98   Ext = -5    Treatments:  Therapeutic Exercise:  Therapeutic Exercise  Therapeutic Exercise Performed: Yes  Therapeutic Exercise Activity 1: quad sets x3min  Therapeutic Exercise Activity 2: [id number]  Therapeutic Exercise Activity 3: ankle pumps x 1min  Therapeutic Exercise Activity 4: nu step x 5min  Therapeutic Exercise Activity 5: slant board 3x30\"  Therapeutic Exercise Activity 6: bars partial squats 2x10  Therapeutic Exercise Activity 7: bars marching high knees 2x60'  Therapeutic Exercise Activity 8: TKE 2x60'  Therapeutic Exercise Activity 9: bars side stepping x 2min  Therapeutic activity:        Manual:  PF mobs - all directions gr 3-4  (educated patient with home mobs)    Manual Therapy  Manual Therapy Performed: Yes  Manual Therapy Activity 1: prom left knee  Manual Therapy Activity 2: PF mobs left gr2-4  Manual Therapy Activity 3: passive left gas/sol/hs  Manual Therapy Activity 4: left leg pull  Gait: patient education knee/hip flexion emphasis  Heel toe pattern fwd   "         Modalities  Cold pack applied to left knee x 15min      Assessment   Assessment:   PT Assessment  Rehab Prognosis: Good  Barriers to Discharge: rom/strength/gait  Evaluation/Treatment Tolerance: Patient tolerated treatment well  Strengths: Ability to acquire knowledge, Attitude of self  Assessment Comment: improved awareness with gait (knee & hip flexion)+ pitting edema left LL   Increased warmth to touch  + pain with over pressure (ext> flex)  Plan:    Cont TKR protcol  End range flex/ext    OP EDUCATION:  Squat tech  TKE with self over pressure  Patellar mobs  Outpatient Education  Individual(s) Educated: Patient  Education Provided: Home Exercise Program    Goals:   Frequency - 2x/wk x 4-6 wks  Patient independent with prescribed HEP  Pt Education - Independent postural and  awareness  AROM - Increased to % (involved vs uninvolved)  Strength - WFL (4+5/5)  Functional Level - reported % (hobbies/work/recreation)  Pain - Verbal Pain 0/10 with all functional activities

## 2024-07-17 DIAGNOSIS — Z96.652 AFTERCARE FOLLOWING LEFT KNEE JOINT REPLACEMENT SURGERY: ICD-10-CM

## 2024-07-17 DIAGNOSIS — Z47.1 AFTERCARE FOLLOWING LEFT KNEE JOINT REPLACEMENT SURGERY: ICD-10-CM

## 2024-07-17 DIAGNOSIS — E78.5 HYPERLIPIDEMIA, UNSPECIFIED HYPERLIPIDEMIA TYPE: ICD-10-CM

## 2024-07-17 RX ORDER — TRAMADOL HYDROCHLORIDE 50 MG/1
50-100 TABLET ORAL EVERY 6 HOURS PRN
Qty: 40 TABLET | Refills: 0 | Status: SHIPPED | OUTPATIENT
Start: 2024-07-17 | End: 2024-07-24

## 2024-07-17 RX ORDER — ICOSAPENT ETHYL 1000 MG/1
1 CAPSULE ORAL DAILY
Qty: 90 CAPSULE | Refills: 0 | Status: SHIPPED | OUTPATIENT
Start: 2024-07-17 | End: 2024-07-17

## 2024-07-17 RX ORDER — ICOSAPENT ETHYL 1 G/1
2 CAPSULE ORAL
Qty: 6 CAPSULE | Refills: 0 | Status: SHIPPED | OUTPATIENT
Start: 2024-07-17 | End: 2024-07-18

## 2024-07-17 RX ORDER — OXYCODONE HYDROCHLORIDE 5 MG/1
5-10 TABLET ORAL EVERY 6 HOURS PRN
Qty: 40 TABLET | Refills: 0 | Status: SHIPPED | OUTPATIENT
Start: 2024-07-17 | End: 2024-07-24

## 2024-07-18 ENCOUNTER — TREATMENT (OUTPATIENT)
Dept: PHYSICAL THERAPY | Facility: CLINIC | Age: 64
End: 2024-07-18
Payer: COMMERCIAL

## 2024-07-18 DIAGNOSIS — M17.12 ARTHRITIS OF LEFT KNEE: ICD-10-CM

## 2024-07-18 PROCEDURE — 97110 THERAPEUTIC EXERCISES: CPT | Mod: GP

## 2024-07-18 PROCEDURE — 97140 MANUAL THERAPY 1/> REGIONS: CPT | Mod: GP

## 2024-07-18 RX ORDER — ICOSAPENT ETHYL 1 G/1
2 CAPSULE ORAL
Qty: 120 CAPSULE | Refills: 2 | Status: SHIPPED | OUTPATIENT
Start: 2024-07-18

## 2024-07-18 ASSESSMENT — PAIN - FUNCTIONAL ASSESSMENT: PAIN_FUNCTIONAL_ASSESSMENT: 0-10

## 2024-07-18 ASSESSMENT — PAIN SCALES - GENERAL: PAINLEVEL_OUTOF10: 5 - MODERATE PAIN

## 2024-07-18 NOTE — PROGRESS NOTES
"Physical Therapy Treatment    Patient Name: Hammad Blackburn  MRN: [id number]  Today's Date: 7/18/2024       Insurance:  Visit number: 3 of 25  Authorization info: UMR - NO AUTH / [place] REVIEW AFTER 25TH VISIT / 100% COVERAGE thru 12/31/24 / DEDUCT $3375 - MET / OOP $$4875 - MET / PER UMR W/S / ds 7/10/24.   Insurance Type: Payor: [employer] / Plan: [employer] / Product Type: *No Product type* /     Current Problem   1. Arthritis of left knee  Follow Up In Physical Therapy          Subjective   General   Positive stffness today following last visit     Precautions:  TKR precautions     Pain 4-5/10  Pain Assessment: 0-10  0-10 (Numeric) Pain Score: 5 - Moderate pain  Pain Type: Acute pain  Pain Location: Knee  Pain Orientation: Anterior  Post Treatment Pain Level 5-6/10    Objective   AROM Left knee flex -100  Ext -15  PROM flex 107  Ext -5    Treatments:  Therapeutic Exercise:  Therapeutic Exercise  Therapeutic Exercise Performed: Yes  Therapeutic Exercise Activity 4: nu step x 5min  Therapeutic Exercise Activity 5: slant board r/l 3x30\"       Manual:   PROM left knee flex/ext  PF joint mobs grade 2-4 (all directions)  STM left quad/gas-sol/hams          Modalities  Cold pack applied to left knee  x15min    Assessment   Assessment:   Increased soreness following last OV  Mild antalgic gait   Increased warmth to touch  Pain with end range ext>flex       Plan: Frequency - 2x/wk x 4-6 wks  Patient independent with prescribed HEP  Pt Education - Independent postural and  awareness  AROM - Increased to % (involved vs uninvolved)  Strength - WFL (4+5/5)  Functional Level - reported % (hobbies/work/recreation)  Pain - Verbal Pain 0/10 with all functional activities        OP EDUCATION:   X fiber scar mobs  Prone hang technique  Resume gait, stair training  CKC pre's b/l    Goals: Frequency - 2x/wk x 6 wks  Patient independent with prescribed HEP  Pt Education - Independent " postural and  awareness  AROM - Increased to % (involved vs uninvolved)  Strength - WFL (4+5/5)  Functional Level - reported % (hobbies/work/recreation)  Pain - Verbal Pain 0/10 with all functional activities

## 2024-07-23 ENCOUNTER — APPOINTMENT (OUTPATIENT)
Dept: PHYSICAL THERAPY | Facility: CLINIC | Age: 64
End: 2024-07-23
Payer: COMMERCIAL

## 2024-07-24 ENCOUNTER — APPOINTMENT (OUTPATIENT)
Dept: RADIOLOGY | Facility: CLINIC | Age: 64
End: 2024-07-24
Payer: COMMERCIAL

## 2024-07-24 ENCOUNTER — APPOINTMENT (OUTPATIENT)
Dept: ORTHOPEDIC SURGERY | Facility: CLINIC | Age: 64
End: 2024-07-24
Payer: COMMERCIAL

## 2024-07-24 DIAGNOSIS — Z96.652 S/P TOTAL KNEE ARTHROPLASTY, LEFT: Primary | ICD-10-CM

## 2024-07-24 PROCEDURE — 99024 POSTOP FOLLOW-UP VISIT: CPT

## 2024-07-24 PROCEDURE — 4004F PT TOBACCO SCREEN RCVD TLK: CPT

## 2024-07-24 PROCEDURE — 3051F HG A1C>EQUAL 7.0%<8.0%: CPT

## 2024-07-24 PROCEDURE — 3048F LDL-C <100 MG/DL: CPT

## 2024-07-24 NOTE — PROGRESS NOTES
CATHI Lundy, PANorbertoC  Division of Adult Reconstruction  Phone: [phone]  Fax: [phone]          HPI:  Diagnosis: End stage osteoarthritis left Knee  Procedure Performed: left Total Knee Arthroplasty   Date of Surgery: June 17, 2024    Hammad Blackburn is a pleasant 63 y.o. year-old male here for regularly schedule follow-up of their left total knee arthroplasty by Dr. Plunkett. The patient is approximately 4 weeks postop. The patient has no specific complaints other than occasional discomfort. The patient has no mechanical symptoms. The patient has moderate swelling and moderate pain. The patient is using Oxycodone, Tramadol, and Tylenol for pain control. The patient has been compliant with FERNANDO compression stockings as prescribed. They have been working with outpatient PT. The patient is ambulatory with a cane. The patient has been compliant with their prescribed ASA for VTE prophylaxis. The patient has had no fall or trauma. The patient's wound has healed uneventfully. The patient denies: fevers, chills, incisional drainage, joint instability, chest or calf pain, shortness of breath.    Review of systems:  There has been no interval change in this patient's past medical, surgical, medications, allergies, family history or social history since the most recent visit to a provider within our department. 14 point review of systems was performed, reviewed, and negative except for pertinent positives documented in the history of present illness.    Past Medical History:   Diagnosis Date    Abnormal stress test     followed by heart cath    Anxiety disorder, unspecified     Arthritis     Cardiology follow-up encounter     Scheduled with JACOBO Lay NP for 4/11/2024    COPD (chronic obstructive pulmonary disease) (Multi)     Coronary atherosclerosis     Diabetes mellitus (Multi)     Elevated prostate specific antigen (PSA)     GERD (gastroesophageal reflux disease)     H/O percutaneous left heart  catheterization 03/11/2024    1. Severe branch vessel CAD (D1 and OM1) in a right dominant system.  2.  of small inferior branch of OM1.  3. Elevated LVEDP.  4. No evidence of aortic stenosis.    History of depression     Hyperlipidemia     Hypertension     Nephrolithiasis     Sleep apnea     Tobacco use     Unilateral primary osteoarthritis, left knee      Patient Active Problem List   Diagnosis    Abdominal bloating    Anxiety    Apnea    Benign essential hypertension    COPD with acute bronchitis (Multi)    Colon polyps    Asthmatic bronchitis with acute exacerbation ([facility])    Fatigue    Esophageal reflux    Gastroenteritis    Gout    Cervicalgia    Diabetes (Multi)    Hyperlipidemia    Joint pain    Insomnia    Malaise and fatigue    Lumbosacral spondylosis    Lumbar radiculopathy, chronic    Nicotine dependence, cigarettes, uncomplicated    Obstructive sleep apnea syndrome    Type 2 diabetes mellitus without complications (Multi)    Sciatica of left side    Umbilical hernia    Primary osteoarthritis of left knee    Low back pain    Bilateral swelling of feet    Chronic pain of toes of both feet    Neuropathic pain of both feet    Paresthesia of both feet    Degenerative arthritis of finger, right    Diabetic peripheral neuropathy (Multi)    Fungal skin infection    Generalized pain    Hand arthritis    Idiopathic peripheral neuropathy    Left knee pain    Nasal septal deviation    Obesity (BMI 30-39.9)    Pain of right hand    Dermatitis    Folliculitis    Psoriasis    Sinusitis    Hyperglycemia    Pain, upper back    Lumbar spondylosis    Benign prostatic hyperplasia without lower urinary tract symptoms    Acquired hypothyroidism    Arteriosclerosis of coronary artery    Cholelithiasis without obstruction    Lateral epicondylitis of left elbow    Maculopapular rash    Medial epicondylitis of elbow    Nonalcoholic steatohepatitis (CELIS)    Urticaria    Abnormal stress test    Tobacco use    Coronary  artery disease involving native coronary artery of native heart with angina pectoris (CMS-HCC)    Cough due to bronchospasm     Medication Documentation Review Audit       Reviewed by Linda Meza MA (Medical Assistant) on 07/10/24 at 1121      Medication Order Taking? Sig Documenting Provider Last Dose Status   acetaminophen (Tylenol) 500 mg tablet [id number]  Take 2 tablets (1,000 mg) by mouth every 8 hours for 20 days. Jung Plunkett MD   24 2359   allopurinol (Zyloprim) 300 mg tablet [id number] No TAKE 1 TABLET BY MOUTH EVERY DAY Richard Garcia MD 2024 Active   amLODIPine (Norvasc) 10 mg tablet [id number]  TAKE 1 TABLET BY MOUTH EVERY DAY Richard Garcia MD  Active   aspirin 81 mg chewable tablet [id number]  Chew 1 tablet (81 mg) 2 times a day. Jung Plunkett MD  Active   budesonide-formoteroL (Symbicort) 160-4.5 mcg/actuation inhaler [id number] No Inhale 2 puffs 2 times a day as needed. Historical Provider, MD 2024 Active   carvedilol (Coreg) 12.5 mg tablet [id number] No Take 1 tablet (12.5 mg) by mouth 2 times a day with meals. Noreen Lay, ENRICO-CNP 2024 Active   cefadroxil (Duricef) 500 mg capsule [id number]  Take 1 capsule (500 mg) by mouth 2 times a day for 10 days. Crystal Martins PA-C   24 2359   co-enzyme Q-10 30 mg capsule [id number] No Take 1 capsule by mouth once daily. Historical Provider, MD Past Week Active   cyclobenzaprine (Flexeril) 10 mg tablet [id number] No Take 1 tablet (10 mg) by mouth 3 times a day as needed for muscle spasms. Richard Garcia MD 2024 Active   docusate sodium (Colace) 100 mg capsule [id number]  Take 1 capsule (100 mg) by mouth 2 times a day for 15 days. Jung Plunkett MD   24 2359   evolocumab (Repatha SureClick) 140 mg/mL injection [id number] No Inject 1 mL (140 mg) under the skin every 14 (fourteen) days. Alan Davis MD Past Week Active   fenofibrate (Tricor) 145 mg tablet [id number]   TAKE 1 TABLET (145 MG) BY MOUTH ONCE DAILY. TAKE WITH FOOD. Richard Garcia MD  Active   fluticasone (Flonase) 50 mcg/actuation nasal spray [id number] No Administer 1 spray into each nostril 2 times a day as needed for rhinitis. Richard Garcia MD 6/17/2024 Active   folic acid (Folvite) 1 mg tablet [id number] No TAKE 1 TABLET BY MOUTH EVERY DAY Richard Garcia MD Past Week Active   metFORMIN (Glucophage) 500 mg tablet [id number] No TAKE 2 TABLETS (1,000 MG) BY MOUTH 2 TIMES A DAY. HOLD FOR 48 HOURS DUE TO CONTRAST   Patient taking differently: TAKE 2 TABLETS (1,000 MG) BY MOUTH 2 TIMES A DAY. HOLD FOR 48 HOURS DUE TO CONTRAST    Patient taking 1 in AM, 2 at dinner, 1 at bedtime    Richard Garcia MD 6/16/2024 Active   ondansetron (Zofran) 4 mg tablet [id number]  Take 1 tablet (4 mg) by mouth every 8 hours if needed for nausea or vomiting. Jung Plunkett MD  Active   OneTouch Delica Plus Lancet 30 gauge misc [id number] No Apply 1 Lancet topically once daily. Richard Garcia MD 6/17/2024 Active   OneTouch Ultra Test strip [id number] No 1 strip 2 times a day. Richard Garcia MD 6/17/2024 Active   oxyCODONE (Roxicodone) 5 mg immediate release tablet [id number]  Take 1-2 tablets (5-10 mg) by mouth every 6 hours if needed for severe pain (7 - 10) for up to 7 days. Crystal Martins PA-C  Active   Ozempic 0.25 mg or 0.5 mg (2 mg/3 mL) pen injector [id number]  INJECT 0.5 MG UNDER THE SKIN 1 (ONE) TIME PER WEEK. Richard Garcia MD  Active   pantoprazole (ProtoNix) 40 mg EC tablet [id number]  Take 1 tablet (40 mg) by mouth once daily in the morning. Take before meals. Do not crush, chew, or split. Jung Plunkett MD  Active   rosuvastatin (Crestor) 40 mg tablet [id number] No TAKE 1 TABLET BY MOUTH EVERY DAY Richard Garcia MD 6/16/2024 Active   semaglutide (OZEMPIC) 1 mg/dose (4 mg/3 mL) pen injector [id number]  Inject 1 mg under the skin 1 (one) time per week. Richard Garcia MD  Active    sennosides (Senokot) 8.6 mg tablet [id number]  Take 1 tablet (8.6 mg) by mouth once daily for 15 days. Jung Plunkett MD   24 2359   traMADol (Ultram) 50 mg tablet [id number]  Take 1-2 tablets ( mg) by mouth every 6 hours if needed for severe pain (7 - 10) for up to 7 days. Crystal Martins PA-C  Active   triamcinolone (Kenalog) 0.1 % ointment [id number] No Triamcinolone Acetonide 0.1 % External Ointment   Quantity: 80  Refills: 0        Start : 20-Aug-2020   Active Historical Provider, MD Past Week Active   Vascepa 1 gram capsule [id number] No TAKE 1 CAPSULE (1 G) BY MOUTH ONCE DAILY. Reyna Portillo PA-C 2024 Active                  No Known Allergies  Social History     Socioeconomic History    Marital status:      Spouse name: Not on file    Number of children: Not on file    Years of education: Not on file    Highest education level: Not on file   Occupational History    Not on file   Tobacco Use    Smoking status: Every Day     Current packs/day: 0.50     Average packs/day: 0.5 packs/day for 43.6 years (21.8 ttl pk-yrs)     Types: Cigarettes     Start date:     Smokeless tobacco: Never    Tobacco comments:     continues to smoke 3 to 5 cigarettes a day   Vaping Use    Vaping status: Never Used   Substance and Sexual Activity    Alcohol use: Yes     Comment: 4-6 alcoholic beverages a week    Drug use: Never    Sexual activity: Not on file   Other Topics Concern    Not on file   Social History Narrative    Not on file     Social Determinants of Health     Financial Resource Strain: Low Risk  (2024)    Overall Financial Resource Strain (CARDIA)     Difficulty of Paying Living Expenses: Not hard at all   Food Insecurity: No Food Insecurity (2024)    Hunger Vital Sign     Worried About Running Out of Food in the Last Year: Never true     Ran Out of Food in the Last Year: Never true   Transportation Needs: No Transportation Needs (2024)    OASIS :  Transportation     Lack of Transportation (Medical): No     Lack of Transportation (Non-Medical): No     Patient Unable or Declines to Respond: No   Physical Activity: Not on file   Stress: Not on file   Social Connections: Feeling Socially Integrated (7/5/2024)    OASIS : Social Isolation     Frequency of experiencing loneliness or isolation: Never   Intimate Partner Violence: Not At Risk (6/18/2024)    Humiliation, Afraid, Rape, and Kick questionnaire     Fear of Current or Ex-Partner: No     Emotionally Abused: No     Physically Abused: No     Sexually Abused: No   Housing Stability: Low Risk  (6/18/2024)    Housing Stability Vital Sign     Unable to Pay for Housing in the Last Year: No     Number of Places Lived in the Last Year: 1     Unstable Housing in the Last Year: No     Past Surgical History:   Procedure Laterality Date    ADENOIDECTOMY      APPENDECTOMY      CARDIAC CATHETERIZATION N/A 03/11/2024    Procedure: Left Heart Cath with Coronary Angiography and LV;  Surgeon: Alan Davis MD;  Location: [facility] Cardiac [facility] Lab;  Service: Cardiovascular;  Laterality: N/A;  Scheduled 3/11/24 @ 10:30 am    COLONOSCOPY      HERNIA REPAIR      KNEE SURGERY      Knee Arthroscopy With Medial And Lateral Meniscus Repair    TONSILLECTOMY         Physical Exam:  General: Well-appearing male is alert and oriented x 3 and appears comfortable. NAD. Pleasant and cooperative.  Mood: Euthymic  Respirations: non-labored  Gait: Slight antalgic  Assistive Device: cane. Coordination and balance intact.    left Knee  No other overlying lesion  Wound: Incision is healing well, midline with no signs of surrounding infection, erythema, fluctuance, dehiscence, drainage, or suture abscess. There is mild warmth and effusion about the knee, both consistent with the normal post-operative healing.   Range of motion: 10 - 93; PROM 5-107 with PT on 7/18  Stable to varus and valgus stress at 0 and 30 degrees.   Patella tracks  midline.  Calf: No swelling or tenderness. Lower extremity warm and well perfused.  Able to dorsi-flex and plantar-flex the ankle with appropriate strength. Able to wiggle all toes.   The foot is warm and well perfused with palpable pulses.   Sensation is intact to light touch.   Pulses: Palpable DP pulse    Imaging:  No radiographs obtained today.    Impression/Plan:  Hammad Blackburn is doing well and is approximately 4 weeks s/p left total knee arthroplasty. I would like him to work aggressively at home and at PT to improve his ROM. With PROM he is able to achieve better ROM but I would like him to easily achieve good flexion and extension with ease on his own. His preop flexion was 5-105 degrees. I would like to get him to this or more to improve his quality of life and functionality of the knee. I briefly discussed the indications, risk, and benefits of DEXTER but would like to avoid this and continue to work in therapy to obtain desired flexion.    A thorough discussion was had with the patient concerning the postoperative course and the patient is in agreement with the plan.  Continued physical therapy with gait training to improve strength and stamina. Progress off support as tolerated. At this time, you may gradually increase your activities and get back to a normal, low-impact lifestyle. Please avoid running, jumping, and heavy lifting. No kneeling on the operative knee until 3 months post op, at that time one should put a pillow, pad, or blanket under the knee when kneeling.  Continue with current pain regimen of Oxycodone, Tramadol, and Tylenol. We discussed strategies to wean off of opioid medications as tolerated. Transition to tylenol. Can incorporate NSAIDs after completion of prophylactic anticoagulation.   No swimming or tub bathing until 3 months post op.  Do not visit dentist until 3 months after surgery unless it is an emergency. Reviewed the need for prophylactic antibiotics prior to any dental or  other invasive procedures. Patient understands that their dentist or myself may prescribe.    All questions answered.    Follow-up 2 weeks without x-rays for ROM check at next visit.    CATHI Ordaz, PANorbertoC  Orthopedic Physician Assisant  Division of Adult Reconstruction  Department of Orthopaedics  [place] [address]

## 2024-07-30 DIAGNOSIS — M17.12 ARTHRITIS OF LEFT KNEE: ICD-10-CM

## 2024-07-30 RX ORDER — PANTOPRAZOLE SODIUM 40 MG/1
40 TABLET, DELAYED RELEASE ORAL
Qty: 30 TABLET | Refills: 0 | Status: SHIPPED | OUTPATIENT
Start: 2024-07-30 | End: 2024-08-29

## 2024-07-31 ENCOUNTER — PHARMACY VISIT (OUTPATIENT)
Dept: PHARMACY | Facility: CLINIC | Age: 64
End: 2024-07-31
Payer: MEDICARE

## 2024-07-31 PROCEDURE — RXMED WILLOW AMBULATORY MEDICATION CHARGE

## 2024-08-01 ENCOUNTER — TREATMENT (OUTPATIENT)
Dept: PHYSICAL THERAPY | Facility: CLINIC | Age: 64
End: 2024-08-01
Payer: COMMERCIAL

## 2024-08-01 DIAGNOSIS — M17.12 ARTHRITIS OF LEFT KNEE: ICD-10-CM

## 2024-08-01 PROCEDURE — 97110 THERAPEUTIC EXERCISES: CPT | Mod: GP

## 2024-08-01 PROCEDURE — 97140 MANUAL THERAPY 1/> REGIONS: CPT | Mod: GP

## 2024-08-01 ASSESSMENT — PAIN SCALES - GENERAL: PAINLEVEL_OUTOF10: 4

## 2024-08-01 ASSESSMENT — PAIN - FUNCTIONAL ASSESSMENT: PAIN_FUNCTIONAL_ASSESSMENT: 0-10

## 2024-08-01 NOTE — PROGRESS NOTES
Physical Therapy Treatment    Patient Name: Hammad Blackburn  MRN: [id number]  Today's Date: 8/1/2024       Insurance:  Visit number: 4 of 25  Authorization info: UMR - NO AUTH / [place] REVIEW AFTER 25TH VISIT / 100% COVERAGE thru 12/31/24 / DEDUCT $3375 - MET / OOP $$4875 - MET / PER UMR W/S / ds 7/10/24.   Insurance Type: Payor: [employer] / Plan: [employer] / Product Type: *No Product type* /     Current Problem   1. Arthritis of left knee  Follow Up In Physical Therapy          Subjective   General   Reason for Referral: L TKR  Referred By: Stacie=6/17/24  Precautions:     Pain 3-4/10  Pain Assessment: 0-10  0-10 (Numeric) Pain Score: 4  Pain Type: Surgical pain  Pain Location: Knee  Pain Orientation: Anterior  Post Treatment Pain Level 3/10    Objective   Left knee flex=105    Ext=-8    Treatments:  Therapeutic Exercise:  Therapeutic Exercise  Therapeutic Exercise Performed: Yes  Therapeutic Exercise Activity 1: BArs marchinh 2x20  Therapeutic Exercise Activity 2: partial squats 2x15  Therapeutic Exercise Activity 3: abd 2x15  Therapeutic Exercise Activity 4: nu step x 5min  Therapeutic Exercise Activity 5: sl      Manual:  Left leg pull  Passive L hs/glut,gas/quad  PF mobs all palnes  Scar mobs     Gait: pbars - fwd 15' x 8  Side stepping x 8  H/T x 8  Retro x 6     Modalities  Cold pack applied to l knee x15min      Assessment   Assessment:    Increased soreness following last OV  Mild antalgic gait   Increased warmth to touch  Pain with end range ext>flex    Plan:    Frequency - 2x/wk x 4-6 wks  L TKR Program  Progress WB, stairs, Independent gait      Goals:  SHORT TERM GOALS:  Patient will report decrease in pain from 4/10 to </= 0/10 to improve quality of life.   Patient will improve R=L AROM to WFL in order to improve gait mechanics and functional mobility  Patient will demonstrate sit to stand x10 without UE to demonstrate improved LE strength.  Patient independent with  prescribed HEP  Pt Education - Independent postural and  awareness and joint protection program  LONG TERM GOALS:  Patient will be independent with HEP to promote self management of condition  Patient will perform reciprocal stair negotiation to demonstrate improved LE strength.   Patient will ambulate with least restrictive device and proper gait mechanics to promote return to prior level of function.    Functional Level - reported % (hobbies/work/recreation)

## 2024-08-06 ENCOUNTER — TREATMENT (OUTPATIENT)
Dept: PHYSICAL THERAPY | Facility: CLINIC | Age: 64
End: 2024-08-06
Payer: COMMERCIAL

## 2024-08-06 ENCOUNTER — APPOINTMENT (OUTPATIENT)
Dept: ORTHOPEDIC SURGERY | Facility: CLINIC | Age: 64
End: 2024-08-06
Payer: COMMERCIAL

## 2024-08-06 DIAGNOSIS — M17.12 ARTHRITIS OF LEFT KNEE: ICD-10-CM

## 2024-08-06 PROCEDURE — 97150 GROUP THERAPEUTIC PROCEDURES: CPT | Mod: GP

## 2024-08-06 PROCEDURE — 97140 MANUAL THERAPY 1/> REGIONS: CPT | Mod: GP

## 2024-08-06 NOTE — PROGRESS NOTES
Physical Therapy Treatment    Patient Name: Hammad Blackburn  MRN: [id number]  Today's Date: 8/6/2024   [id number]    Insurance:  Visit number: 5 of 25  Authorization info: UMR - NO AUTH / [place] REVIEW AFTER 25TH VISIT / 100% COVERAGE thru 12/31/24 / DEDUCT $3375 - MET / OOP $$4875 - MET / PER UMR W/S / ds 7/10/24.   Insurance Type: Payor: [employer] / Plan: [employer] / Product Type: *No Product type* /     Current Problem   1. Arthritis of left knee  Follow Up In Physical Therapy          Subjective   General   Reason for Referral: L TKR  Referred By: Dimitrios  Precautions:     Pain 4-5     Post Treatment Pain Level 4    Treatments:  Therapeutic Exercise:  Therapeutic Exercise Performed: Yes  Therapeutic Exercise Activity 1: BArs marchinh 2x20  Therapeutic Exercise Activity 2: partial squats 2x15  Therapeutic Exercise Activity 3: abd 2x15  Therapeutic Exercise Activity 4: nu step x 5min  Therapeutic Exercise Activity 5: add with bridge  Prone HS with manual assist x20    Manual:  Passive left HS/quad/glut/ITB/ADD  PF jt mobs (gr 3-4)   PROM Left knee (with CR)       Modalities ----Patient had no time for ice today      Assessment   Assessment:    Increased soreness following last OV  Mild antalgic gait   Increased warmth to touch  Pain with end range ext>flex     Plan:    Frequency - 2x/wk x 4-6 wks  L TKR Program  Progress WB, stairs, Independent gait        Goals:  SHORT TERM GOALS:  Patient will report decrease in pain from 4/10 to </= 0/10 to improve quality of life.   Patient will improve R=L AROM to WFL in order to improve gait mechanics and functional mobility  Patient will demonstrate sit to stand x10 without UE to demonstrate improved LE strength.  Patient independent with prescribed HEP  Pt Education - Independent postural and  awareness and joint protection program  LONG TERM GOALS:  Patient will be independent with HEP to promote self management of condition  Patient  will perform reciprocal stair negotiation to demonstrate improved LE strength.   Patient will ambulate with least restrictive device and proper gait mechanics to promote return to prior level of function.    Functional Level - reported % (hobbies/work/recreation)

## 2024-08-07 DIAGNOSIS — Z96.652 S/P TOTAL KNEE ARTHROPLASTY, LEFT: Primary | ICD-10-CM

## 2024-08-07 RX ORDER — TRAMADOL HYDROCHLORIDE 50 MG/1
50-100 TABLET ORAL EVERY 6 HOURS PRN
Qty: 40 TABLET | Refills: 0 | Status: SHIPPED | OUTPATIENT
Start: 2024-08-07 | End: 2024-08-08 | Stop reason: SDUPTHER

## 2024-08-07 RX ORDER — OXYCODONE HYDROCHLORIDE 5 MG/1
5-10 TABLET ORAL EVERY 6 HOURS PRN
Qty: 40 TABLET | Refills: 0 | Status: SHIPPED | OUTPATIENT
Start: 2024-08-07 | End: 2024-08-08 | Stop reason: SDUPTHER

## 2024-08-08 DIAGNOSIS — Z96.652 S/P TOTAL KNEE ARTHROPLASTY, LEFT: Primary | ICD-10-CM

## 2024-08-08 RX ORDER — OXYCODONE HYDROCHLORIDE 5 MG/1
5-10 TABLET ORAL EVERY 6 HOURS PRN
Qty: 40 TABLET | Refills: 0 | Status: SHIPPED | OUTPATIENT
Start: 2024-08-08 | End: 2024-08-15

## 2024-08-08 RX ORDER — TRAMADOL HYDROCHLORIDE 50 MG/1
50-100 TABLET ORAL EVERY 6 HOURS PRN
Qty: 40 TABLET | Refills: 0 | Status: SHIPPED | OUTPATIENT
Start: 2024-08-08 | End: 2024-08-15

## 2024-08-09 ENCOUNTER — TREATMENT (OUTPATIENT)
Dept: PHYSICAL THERAPY | Facility: CLINIC | Age: 64
End: 2024-08-09
Payer: COMMERCIAL

## 2024-08-09 DIAGNOSIS — M17.12 ARTHRITIS OF LEFT KNEE: ICD-10-CM

## 2024-08-09 PROCEDURE — 97110 THERAPEUTIC EXERCISES: CPT | Mod: GP | Performed by: PHYSICAL THERAPIST

## 2024-08-09 PROCEDURE — 97530 THERAPEUTIC ACTIVITIES: CPT | Mod: GP | Performed by: PHYSICAL THERAPIST

## 2024-08-09 ASSESSMENT — PAIN SCALES - GENERAL: PAINLEVEL_OUTOF10: 3

## 2024-08-09 ASSESSMENT — PAIN - FUNCTIONAL ASSESSMENT: PAIN_FUNCTIONAL_ASSESSMENT: 0-10

## 2024-08-09 NOTE — PROGRESS NOTES
Physical Therapy Progress Note/Treatment    Patient Name: Hammad Blackburn  MRN: [id number]  Today's Date: 8/9/2024  Time Calculation  Start Time: 1059  Stop Time: 1140  Time Calculation (min): 41 min  PT Therapeutic Procedures Time Entry  Therapeutic Exercise Time Entry: 31  Therapeutic Activity Time Entry: 10    Progress Note: 7/11/24-8/9/24    Insurance:  Visit number: 6 of 18  Authorization info: NO AUTH / [place] REVIEW AFTER 25TH   Insurance Type: Payor: [employer] / Plan: [employer] / Product Type: *No Product type* /     Current Problem   1. Arthritis of left knee  Follow Up In Physical Therapy          Subjective   General   Reason for Referral: L TKR  Referred By: Dimitrios  General Comment: Pt reports sorness in L knee, he sat for a long time yesterday in hospital which created incresaed discomfort. Overall feels he is getting better but thought he would be further along.  Precautions:  Precautions  Post-Surgical Precautions: Left total knee precautions  Pain   Pain Assessment: 0-10  0-10 (Numeric) Pain Score: 3  Post Treatment Pain Level 4 sore    Objective   L knee ROM - Lacking 10 to 100  Reduced L LE stance time    Treatments:  Therapeutic Exercise:  Therapeutic Exercise  Therapeutic Exercise Performed: Yes  Therapeutic Exercise Activity 1: NuStep L5 7'  Therapeutic Exercise Activity 2: knee flexion stretch on step 10x  Therapeutic Exercise Activity 3: HS stretch on step 10x with knee fleixon stretch  Therapeutic Exercise Activity 4: squat 10x (attempted however LB discomfort)  Therapeutic Exercise Activity 5: elevated sit to stand 10x  Therapeutic activity:  Therapeutic Activity  Therapeutic Activity Performed: Yes  Therapeutic Activity 1: Re-assessment see objective       Assessment   Assessment:   PT Assessment  Assessment Comment: Pt demonstrates slow progress since the start of therapy continues to have reduced TKE with quad contraction and stiffness with flexion ROM. Progressed  exercises to standing strengthening with quad activiation as well as body weight stretching. Discussed findings with patient and in agreement to continue with established POC to focus on progression of exercises.    Plan:    Progress strengthening and promote full ROM     OP EDUCATION:   Updated HEP :  Access Code: [email or web address]  URL: [email or web address]  Date: 08/09/2024  Prepared by: Latesha Euceda    Exercises  - Standing Knee Flexion Stretch on Step  - 1 x daily - 7 x weekly - 3 sets - 10 reps - 10-15 seconds hold  - Standing Hamstring Stretch with Step  - 1 x daily - 7 x weekly - 3 sets - 10 reps - 10-15 seconds hold  - Sit to Stand Without Arm Support  - 1 x daily - 7 x weekly - 3 sets - 10 reps  - Terminal Knee Extension with Ball and Counter  - 1 x daily - 7 x weekly - 3 sets - 10 reps    Goals:   Active       PN Added Goals       PT Goal 1       Start:  08/09/24    Expected End:  09/26/24       Pt will be able to perform ascending/descending reciprocal pattern of 10 steps in 5 weeks for home ambulation needs.          PT Goal 2       Start:  08/09/24    Expected End:  09/26/24       Pt will be able to perform community ambulation demonstrating normalized gait pattern without need of assistance in 6 weeks for home and community needs.          PT Goal 3       Start:  08/09/24    Expected End:  09/26/24       Pt will be able to perform sit <> stand from normal height with UE PRN assist in 6 weeks to improve transfer needs at home and community.                 Eval Goals: In 6-8 Weeks: PROGRESSING  Patient independent with prescribed HEP (ROM, strength, jt mobility, home modalities)  Pt Education - Independent postural and  awareness, Independent community amb)  AROM - Increased flex = 100-110  ext=0 (involved vs uninvolved)  Strength - WFL (4+5/5) L=R knee  Functional Level - reported % (hobbies/work/recreation - bowling/golf)  Pain - Verbal Pain 0/10 with all functional activities

## 2024-08-12 DIAGNOSIS — E78.5 HYPERLIPIDEMIA, UNSPECIFIED HYPERLIPIDEMIA TYPE: ICD-10-CM

## 2024-08-12 RX ORDER — ICOSAPENT ETHYL 1 G/1
2 CAPSULE ORAL
Qty: 360 CAPSULE | Refills: 1 | Status: SHIPPED | OUTPATIENT
Start: 2024-08-12

## 2024-08-13 ENCOUNTER — OFFICE VISIT (OUTPATIENT)
Dept: ORTHOPEDIC SURGERY | Facility: CLINIC | Age: 64
End: 2024-08-13
Payer: COMMERCIAL

## 2024-08-13 DIAGNOSIS — Z96.652 S/P TOTAL KNEE ARTHROPLASTY, LEFT: Primary | ICD-10-CM

## 2024-08-13 DIAGNOSIS — R53.83 FATIGUE, UNSPECIFIED TYPE: ICD-10-CM

## 2024-08-13 PROCEDURE — 99211 OFF/OP EST MAY X REQ PHY/QHP: CPT

## 2024-08-13 PROCEDURE — 4004F PT TOBACCO SCREEN RCVD TLK: CPT

## 2024-08-13 PROCEDURE — 3048F LDL-C <100 MG/DL: CPT

## 2024-08-13 PROCEDURE — 3051F HG A1C>EQUAL 7.0%<8.0%: CPT

## 2024-08-13 RX ORDER — FOLIC ACID 1 MG/1
1 TABLET ORAL DAILY
Qty: 90 TABLET | Refills: 0 | Status: SHIPPED | OUTPATIENT
Start: 2024-08-13

## 2024-08-13 NOTE — PROGRESS NOTES
CATHI Lundy, PANorbertoC  Division of Adult Reconstruction  Phone: [phone]  Fax: [phone]          HPI:  Diagnosis: End stage osteoarthritis left Knee  Procedure Performed: left Total Knee Arthroplasty   Date of Surgery: June 17, 2024    Hammad Blackburn is a pleasant 63 y.o. year-old male here for regularly schedule follow-up of their left total knee arthroplasty by Dr. Plunkett. The patient is approximately 7 weeks postop. He states his flexion has been improving but has still had continued stiffness with the knee. He also states he has had trouble sleeping at night due to discomfort and muscle stiffness. The patient has no mechanical symptoms. The patient has mild swelling and mild pain. The patient is using 1 Oxycodone and 1 Tramadol each day, as well as Tylenol for pain control. The patient has been compliant with FERNANDO compression stockings as prescribed. They have been working with outpatient PT. The patient is ambulatory with a cane. The patient has been compliant with their prescribed ASA for VTE prophylaxis. The patient has had no fall or trauma. The patient's wound has healed uneventfully. The patient denies: fevers, chills, incisional drainage, joint instability, chest or calf pain, shortness of breath.    Review of systems:  There has been no interval change in this patient's past medical, surgical, medications, allergies, family history or social history since the most recent visit to a provider within our department. 14 point review of systems was performed, reviewed, and negative except for pertinent positives documented in the history of present illness.    Past Medical History:   Diagnosis Date    Abnormal stress test     followed by heart cath    Anxiety disorder, unspecified     Arthritis     Cardiology follow-up encounter     Scheduled with JACOBO Lay NP for 4/11/2024    COPD (chronic obstructive pulmonary disease) (Multi)     Coronary atherosclerosis     Diabetes mellitus  (Multi)     Elevated prostate specific antigen (PSA)     GERD (gastroesophageal reflux disease)     H/O percutaneous left heart catheterization 03/11/2024    1. Severe branch vessel CAD (D1 and OM1) in a right dominant system.  2.  of small inferior branch of OM1.  3. Elevated LVEDP.  4. No evidence of aortic stenosis.    History of depression     Hyperlipidemia     Hypertension     Nephrolithiasis     Sleep apnea     Tobacco use     Unilateral primary osteoarthritis, left knee      Patient Active Problem List   Diagnosis    Abdominal bloating    Anxiety    Apnea    Benign essential hypertension    COPD with acute bronchitis (Multi)    Colon polyps    Asthmatic bronchitis with acute exacerbation ([facility])    Fatigue    Esophageal reflux    Gastroenteritis    Gout    Cervicalgia    Diabetes (Multi)    Hyperlipidemia    Joint pain    Insomnia    Malaise and fatigue    Lumbosacral spondylosis    Lumbar radiculopathy, chronic    Nicotine dependence, cigarettes, uncomplicated    Obstructive sleep apnea syndrome    Type 2 diabetes mellitus without complications (Multi)    Sciatica of left side    Umbilical hernia    Primary osteoarthritis of left knee    Low back pain    Bilateral swelling of feet    Chronic pain of toes of both feet    Neuropathic pain of both feet    Paresthesia of both feet    Degenerative arthritis of finger, right    Diabetic peripheral neuropathy (Multi)    Fungal skin infection    Generalized pain    Hand arthritis    Idiopathic peripheral neuropathy    Left knee pain    Nasal septal deviation    Obesity (BMI 30-39.9)    Pain of right hand    Dermatitis    Folliculitis    Psoriasis    Sinusitis    Hyperglycemia    Pain, upper back    Lumbar spondylosis    Benign prostatic hyperplasia without lower urinary tract symptoms    Acquired hypothyroidism    Arteriosclerosis of coronary artery    Cholelithiasis without obstruction    Lateral epicondylitis of left elbow    Maculopapular rash    Medial  epicondylitis of elbow    Nonalcoholic steatohepatitis (CELIS)    Urticaria    Abnormal stress test    Tobacco use    Coronary artery disease involving native coronary artery of native heart with angina pectoris (CMS-HCC)    Cough due to bronchospasm     Medication Documentation Review Audit       Reviewed by Linda Meza MA (Medical Assistant) on 08/13/24 at 1528      Medication Order Taking? Sig Documenting Provider Last Dose Status   allopurinol (Zyloprim) 300 mg tablet [id number] No TAKE 1 TABLET BY MOUTH EVERY DAY Richard Garcia MD 6/9/2024 Active   amLODIPine (Norvasc) 10 mg tablet [id number]  TAKE 1 TABLET BY MOUTH EVERY DAY Richard Garcia MD  Active   budesonide-formoteroL (Symbicort) 160-4.5 mcg/actuation inhaler [id number] No Inhale 2 puffs 2 times a day as needed. Historical Provider, MD 6/16/2024 Active   carvedilol (Coreg) 12.5 mg tablet [id number] No Take 1 tablet (12.5 mg) by mouth 2 times a day with meals. Noreen Lay, ENRICO-CNP 6/17/2024 Active   co-enzyme Q-10 30 mg capsule [id number] No Take 1 capsule by mouth once daily. Historical Provider, MD Past Week Active   cyclobenzaprine (Flexeril) 10 mg tablet [id number] No Take 1 tablet (10 mg) by mouth 3 times a day as needed for muscle spasms. Richard Garcia MD 6/16/2024 Active   evolocumab (Repatha SureClick) 140 mg/mL injection [id number] No Inject 1 mL (140 mg) under the skin every 14 (fourteen) days. Alan Davis MD Past Week Active   fenofibrate (Tricor) 145 mg tablet [id number]  TAKE 1 TABLET (145 MG) BY MOUTH ONCE DAILY. TAKE WITH FOOD. Richard Garcia MD  Active   fluticasone (Flonase) 50 mcg/actuation nasal spray [id number] No Administer 1 spray into each nostril 2 times a day as needed for rhinitis. Richard Garcia MD 6/17/2024 Active   Discontinued 08/13/24 0901   folic acid (Folvite) 1 mg tablet [id number]  TAKE 1 TABLET BY MOUTH EVERY DAY Richard Garcia MD  Active     Discontinued 08/12/24 0904   icosapent ethyL  (Vascepa) 1 gram capsule [id number]  TAKE 2 CAPSULES (2 G) BY MOUTH 2 TIMES DAILY (MORNING AND LATE AFTERNOON). Richard Garcia MD  Active   metFORMIN (Glucophage) 500 mg tablet [id number] No TAKE 2 TABLETS (1,000 MG) BY MOUTH 2 TIMES A DAY. HOLD FOR 48 HOURS DUE TO CONTRAST   Patient taking differently: TAKE 2 TABLETS (1,000 MG) BY MOUTH 2 TIMES A DAY. HOLD FOR 48 HOURS DUE TO CONTRAST    Patient taking 1 in AM, 2 at dinner, 1 at bedtime    Richard Garcia MD 6/16/2024 Active   ondansetron (Zofran) 4 mg tablet [id number]  Take 1 tablet (4 mg) by mouth every 8 hours if needed for nausea or vomiting. Jung Plunkett MD  Active   OneTouch Delica Plus Lancet 30 gauge misc [id number]  Apply 1 Lancet topically once daily. Richard Garcia MD  Active   OneTouch Ultra Test strip [id number] No 1 strip 2 times a day. Richard Garcia MD 6/17/2024 Active     Discontinued 08/08/24 1059   oxyCODONE (Roxicodone) 5 mg immediate release tablet [id number]  Take 1-2 tablets (5-10 mg) by mouth every 6 hours if needed for severe pain (7 - 10) for up to 7 days. Crystal Martins PA-C  Active   Ozempic 0.25 mg or 0.5 mg (2 mg/3 mL) pen injector [id number]  INJECT 0.5 MG UNDER THE SKIN 1 (ONE) TIME PER WEEK. Richard Garcia MD  Active   pantoprazole (ProtoNix) 40 mg EC tablet [id number]  Take 1 tablet (40 mg) by mouth once daily in the morning. Take before meals. Do not crush, chew, or split. Richard Garcia MD  Active   rosuvastatin (Crestor) 40 mg tablet [id number] No TAKE 1 TABLET BY MOUTH EVERY DAY Richard Garcia MD 6/16/2024 Active   semaglutide (OZEMPIC) 1 mg/dose (4 mg/3 mL) pen injector [id number]  Inject 1 mg under the skin 1 (one) time per week. Richard Garcia MD  Active     Discontinued 08/08/24 1059   traMADol (Ultram) 50 mg tablet [id number]  Take 1-2 tablets ( mg) by mouth every 6 hours if needed for severe pain (7 - 10) for up to 7 days. Crystal Martins PA-C  Active   triamcinolone  (Kenalog) 0.1 % ointment [id number] No Triamcinolone Acetonide 0.1 % External Ointment   Quantity: 80  Refills: 0        Start : 20-Aug-2020   Active Historical Provider, MD Past Week Active                  No Known Allergies  Social History     Socioeconomic History    Marital status:      Spouse name: Not on file    Number of children: Not on file    Years of education: Not on file    Highest education level: Not on file   Occupational History    Not on file   Tobacco Use    Smoking status: Every Day     Current packs/day: 0.50     Average packs/day: 0.5 packs/day for 43.6 years (21.8 ttl pk-yrs)     Types: Cigarettes     Start date: 1981    Smokeless tobacco: Never    Tobacco comments:     continues to smoke 3 to 5 cigarettes a day   Vaping Use    Vaping status: Never Used   Substance and Sexual Activity    Alcohol use: Yes     Comment: 4-6 alcoholic beverages a week    Drug use: Never    Sexual activity: Not on file   Other Topics Concern    Not on file   Social History Narrative    Not on file     Social Determinants of Health     Financial Resource Strain: Low Risk  (6/18/2024)    Overall Financial Resource Strain (CARDIA)     Difficulty of Paying Living Expenses: Not hard at all   Food Insecurity: No Food Insecurity (6/18/2024)    Hunger Vital Sign     Worried About Running Out of Food in the Last Year: Never true     Ran Out of Food in the Last Year: Never true   Transportation Needs: No Transportation Needs (7/5/2024)    OASIS : Transportation     Lack of Transportation (Medical): No     Lack of Transportation (Non-Medical): No     Patient Unable or Declines to Respond: No   Physical Activity: Not on file   Stress: Not on file   Social Connections: Feeling Socially Integrated (7/5/2024)    OASIS : Social Isolation     Frequency of experiencing loneliness or isolation: Never   Intimate Partner Violence: Not At Risk (6/18/2024)    Humiliation, Afraid, Rape, and Kick questionnaire     Fear of  Current or Ex-Partner: No     Emotionally Abused: No     Physically Abused: No     Sexually Abused: No   Housing Stability: Low Risk  (6/18/2024)    Housing Stability Vital Sign     Unable to Pay for Housing in the Last Year: No     Number of Places Lived in the Last Year: 1     Unstable Housing in the Last Year: No     Past Surgical History:   Procedure Laterality Date    ADENOIDECTOMY      APPENDECTOMY      CARDIAC CATHETERIZATION N/A 03/11/2024    Procedure: Left Heart Cath with Coronary Angiography and LV;  Surgeon: Alan Davis MD;  Location: [facility] Cardiac Cath Lab;  Service: Cardiovascular;  Laterality: N/A;  Scheduled 3/11/24 @ 10:30 am    COLONOSCOPY      HERNIA REPAIR      KNEE SURGERY      Knee Arthroscopy With Medial And Lateral Meniscus Repair    TONSILLECTOMY         Physical Exam:  General: Well-appearing male is alert and oriented x 3 and appears comfortable. NAD. Pleasant and cooperative.  Mood: Euthymic  Respirations: non-labored  Gait: Slight antalgic  Assistive Device: cane. Coordination and balance intact.    left Knee  No other overlying lesion  Wound: Incision is well healed, midline with no signs of surrounding infection, erythema, fluctuance, dehiscence, drainage, or suture abscess. There is mild warmth and effusion about the knee, both consistent with the normal post-operative healing.   Range of motion: 5-109  Stable to varus and valgus stress at 0 and 30 degrees.   Patella tracks midline.  Calf: No swelling or tenderness. Lower extremity warm and well perfused.  Able to dorsi-flex and plantar-flex the ankle with appropriate strength. Able to wiggle all toes.   The foot is warm and well perfused with palpable pulses.   Sensation is intact to light touch.   Pulses: Palpable DP pulse    Imaging:  No radiographs obtained today.    Impression/Plan:  Hammad Blackburn is doing well and is approximately 7 weeks s/p left total knee arthroplasty. I am very pleased with his ROM today. He is feeling  happy with his knee and I explained to him that his stiffness and occasional pain will continue to improve the further he gets out from surgery.    A thorough discussion was had with the patient concerning the postoperative course and the patient is in agreement with the plan.  Continued physical therapy with gait training to improve strength and stamina. Progress off support as tolerated. At this time, you may gradually increase your activities and get back to a normal, low-impact lifestyle. Please avoid running, jumping, and heavy lifting. No kneeling on the operative knee until 3 months post op, at that time one should put a pillow, pad, or blanket under the knee when kneeling.  Instructed patient to incorporate Advil into his pain regiment in addition to Tylenol.   Advised patient that he can take the Flexeril he has for his LBP to aid in sleep and his muscle stiffness. Do not take within 3 hours of Oxycodone or Tramadol.   No swimming or tub bathing until 3 months post op.  Do not visit dentist until 3 months after surgery unless it is an emergency. Reviewed the need for prophylactic antibiotics prior to any dental or other invasive procedures. Patient understands that their dentist or myself may prescribe.    All questions answered.    Follow-up 10 months with xrays for 1 year post op visit.    CATHI Ordaz, PANorbertoC  Orthopedic Physician Assisant  Division of Adult Reconstruction  Department of Orthopaedics  [place] [address]

## 2024-08-15 ENCOUNTER — APPOINTMENT (OUTPATIENT)
Dept: PHYSICAL THERAPY | Facility: CLINIC | Age: 64
End: 2024-08-15
Payer: COMMERCIAL

## 2024-08-22 DIAGNOSIS — E78.5 HYPERLIPIDEMIA, UNSPECIFIED HYPERLIPIDEMIA TYPE: ICD-10-CM

## 2024-08-23 DIAGNOSIS — M17.12 ARTHRITIS OF LEFT KNEE: ICD-10-CM

## 2024-08-23 RX ORDER — PANTOPRAZOLE SODIUM 40 MG/1
TABLET, DELAYED RELEASE ORAL
Qty: 90 TABLET | Refills: 1 | Status: SHIPPED | OUTPATIENT
Start: 2024-08-23

## 2024-08-28 ENCOUNTER — PHARMACY VISIT (OUTPATIENT)
Dept: PHARMACY | Facility: CLINIC | Age: 64
End: 2024-08-28
Payer: MEDICARE

## 2024-08-28 PROCEDURE — RXMED WILLOW AMBULATORY MEDICATION CHARGE

## 2024-09-03 DIAGNOSIS — Z00.00 ENCOUNTER FOR GENERAL ADULT MEDICAL EXAMINATION WITHOUT ABNORMAL FINDINGS: ICD-10-CM

## 2024-09-03 DIAGNOSIS — M47.816 SPONDYLOSIS WITHOUT MYELOPATHY OR RADICULOPATHY, LUMBAR REGION: ICD-10-CM

## 2024-09-03 RX ORDER — CYCLOBENZAPRINE HCL 10 MG
10 TABLET ORAL 3 TIMES DAILY PRN
Qty: 90 TABLET | Refills: 0 | Status: SHIPPED | OUTPATIENT
Start: 2024-09-03

## 2024-09-03 RX ORDER — ROSUVASTATIN CALCIUM 40 MG/1
40 TABLET, COATED ORAL DAILY
Qty: 90 TABLET | Refills: 0 | Status: SHIPPED | OUTPATIENT
Start: 2024-09-03

## 2024-09-03 RX ORDER — ALLOPURINOL 300 MG/1
300 TABLET ORAL DAILY
Qty: 90 TABLET | Refills: 0 | Status: SHIPPED | OUTPATIENT
Start: 2024-09-03

## 2024-09-04 ENCOUNTER — TREATMENT (OUTPATIENT)
Dept: PHYSICAL THERAPY | Facility: CLINIC | Age: 64
End: 2024-09-04
Payer: COMMERCIAL

## 2024-09-04 DIAGNOSIS — M17.12 ARTHRITIS OF LEFT KNEE: ICD-10-CM

## 2024-09-04 PROCEDURE — 97110 THERAPEUTIC EXERCISES: CPT | Mod: GP,CQ

## 2024-09-04 ASSESSMENT — PAIN DESCRIPTION - DESCRIPTORS: DESCRIPTORS: ACHING;SHARP

## 2024-09-04 ASSESSMENT — PAIN SCALES - GENERAL: PAINLEVEL_OUTOF10: 5 - MODERATE PAIN

## 2024-09-04 NOTE — PROGRESS NOTES
"Physical Therapy Treatment    Patient Name: Hammad Blackburn  MRN: [id number]  Today's Date: 9/4/2024  Time Calculation  Start Time: 1103  Stop Time: 1147  Time Calculation (min): 44 min  PT Therapeutic Procedures Time Entry  Therapeutic Exercise Time Entry: 42    Insurance:  Visit number: 7 of 18  Authorization info: [employer] - NO AUTH / [place] REVIEW AFTER 25TH VISIT / 100% COVERAGE thru 12/31/24 / DEDUCT $3375 - MET / OOP $$4875 - MET / PER [employer] W/S / ds 7/10/24.   Insurance Type: Payor: [employer] / Plan: [place] / Product Type: *No Product type* /     Current Problem   1. Arthritis of left knee  Follow Up In Physical Therapy          Subjective   Pt reports increased LB and knee pain on arrival.    General   Reason for Referral: L TKR  Referred By: Dimitrios  General Comment: Pt reports LB pain as well as L knee S/S on arrival.  Precautions:  Precautions  Post-Surgical Precautions: Left total knee precautions  Pain   0-10 (Numeric) Pain Score: 5 - Moderate pain  Pain Type: Surgical pain  Pain Location: Knee  Pain Orientation: Left  Pain Radiating Towards: Also LB pain today.  Pain Descriptors: Aching, Sharp  Pain Frequency: Intermittent  Post Treatment Pain Level 5/10    Objective   AROM L knee 4-113 after ther ex.    Treatments:  Therapeutic Exercise:  Therapeutic Exercise  Therapeutic Exercise Performed: Yes  Therapeutic Exercise Activity 1: NuStep L5 7'  Therapeutic Exercise Activity 2: knee flexion stretch on step 10x  Therapeutic Exercise Activity 3: Seated hamstring stretch 20\"x3  Therapeutic Exercise Activity 4: Hip adduction 5\" hold 2x10  Therapeutic Exercise Activity 5: Hip abduction, 5\" hold, PTB 2x10  Therapeutic Exercise Activity 6: Quad set 5\" hold 2x10  Therapeutic Exercise Activity 7: Quad set with OP 5\" hold 2x10  Therapeutic Exercise Activity 8: Heel slides 5\" hold 2x10  Therapeutic Exercise Activity 9: Heel slides with OP 10\" hold x15  Therapeutic Exercise Activity 10: Step ups " "6\" step 2x10      Assessment   Assessment:   PT Assessment  PT Assessment Results: Decreased strength, Decreased range of motion, Decreased endurance, Pain  Rehab Prognosis: Good  Evaluation/Treatment Tolerance: Patient tolerated treatment well, Patient limited by pain  Assessment Comment: Pt tolerates treatment without increased pain level, demonstrates improvede L knee AROM as noted.    Plan:   OP PT Plan  Treatment/Interventions: Gait training, Neuromuscular re-education, Education/ Instruction, Therapeutic exercises, Therapeutic activities  PT Plan: Skilled PT  PT Frequency: 2 times per week  Number of Treatments Authorized: 18  Rehab Potential: Good  Plan of Care Agreement: Patient    OP EDUCATION:  Outpatient Education  Individual(s) Educated: Patient  Education Provided: Body Mechanics, Anatomy  Patient/Caregiver Demonstrated Understanding: yes  Patient Response to Education: Patient/Caregiver Verbalized Understanding of Information, Patient/Caregiver Performed Return Demonstration of Exercises/Activities, Patient/Caregiver Asked Appropriate Questions    Goals:   Active       PN Added Goals       PT Goal 1       Start:  08/09/24    Expected End:  09/26/24       Pt will be able to perform ascending/descending reciprocal pattern of 10 steps in 5 weeks for home ambulation needs.          PT Goal 2       Start:  08/09/24    Expected End:  09/26/24       Pt will be able to perform community ambulation demonstrating normalized gait pattern without need of assistance in 6 weeks for home and community needs.          PT Goal 3       Start:  08/09/24    Expected End:  09/26/24       Pt will be able to perform sit <> stand from normal height with UE PRN assist in 6 weeks to improve transfer needs at home and community.                "

## 2024-09-05 ENCOUNTER — APPOINTMENT (OUTPATIENT)
Dept: PRIMARY CARE | Facility: CLINIC | Age: 64
End: 2024-09-05
Payer: COMMERCIAL

## 2024-09-05 VITALS
WEIGHT: 258 LBS | BODY MASS INDEX: 33.11 KG/M2 | HEART RATE: 68 BPM | OXYGEN SATURATION: 98 % | SYSTOLIC BLOOD PRESSURE: 130 MMHG | RESPIRATION RATE: 17 BRPM | HEIGHT: 74 IN | DIASTOLIC BLOOD PRESSURE: 75 MMHG

## 2024-09-05 DIAGNOSIS — I10 BENIGN ESSENTIAL HYPERTENSION: ICD-10-CM

## 2024-09-05 DIAGNOSIS — M17.12 PRIMARY OSTEOARTHRITIS OF LEFT KNEE: ICD-10-CM

## 2024-09-05 DIAGNOSIS — E11.9 TYPE 2 DIABETES MELLITUS WITHOUT COMPLICATION, WITHOUT LONG-TERM CURRENT USE OF INSULIN (MULTI): ICD-10-CM

## 2024-09-05 DIAGNOSIS — Z00.00 ANNUAL PHYSICAL EXAM: Primary | ICD-10-CM

## 2024-09-05 DIAGNOSIS — I25.119 CORONARY ARTERY DISEASE INVOLVING NATIVE CORONARY ARTERY OF NATIVE HEART WITH ANGINA PECTORIS (CMS-HCC): ICD-10-CM

## 2024-09-05 DIAGNOSIS — E03.9 ACQUIRED HYPOTHYROIDISM: ICD-10-CM

## 2024-09-05 DIAGNOSIS — M47.27 OSTEOARTHRITIS OF SPINE WITH RADICULOPATHY, LUMBOSACRAL REGION: ICD-10-CM

## 2024-09-05 DIAGNOSIS — K75.81 NONALCOHOLIC STEATOHEPATITIS (NASH): ICD-10-CM

## 2024-09-05 PROBLEM — E11.42 DIABETIC PERIPHERAL NEUROPATHY (MULTI): Status: RESOLVED | Noted: 2023-11-16 | Resolved: 2024-09-05

## 2024-09-05 PROCEDURE — 99214 OFFICE O/P EST MOD 30 MIN: CPT | Performed by: INTERNAL MEDICINE

## 2024-09-05 PROCEDURE — 4004F PT TOBACCO SCREEN RCVD TLK: CPT | Performed by: INTERNAL MEDICINE

## 2024-09-05 PROCEDURE — 3048F LDL-C <100 MG/DL: CPT | Performed by: INTERNAL MEDICINE

## 2024-09-05 PROCEDURE — 3051F HG A1C>EQUAL 7.0%<8.0%: CPT | Performed by: INTERNAL MEDICINE

## 2024-09-05 PROCEDURE — 3075F SYST BP GE 130 - 139MM HG: CPT | Performed by: INTERNAL MEDICINE

## 2024-09-05 PROCEDURE — 3078F DIAST BP <80 MM HG: CPT | Performed by: INTERNAL MEDICINE

## 2024-09-05 PROCEDURE — 3008F BODY MASS INDEX DOCD: CPT | Performed by: INTERNAL MEDICINE

## 2024-09-05 RX ORDER — SEMAGLUTIDE 0.68 MG/ML
0.5 INJECTION, SOLUTION SUBCUTANEOUS
Qty: 3 ML | Refills: 0 | Status: SHIPPED | OUTPATIENT
Start: 2024-09-08

## 2024-09-05 ASSESSMENT — ENCOUNTER SYMPTOMS
CARDIOVASCULAR NEGATIVE: 1
HEMATOLOGIC/LYMPHATIC NEGATIVE: 1
ALLERGIC/IMMUNOLOGIC NEGATIVE: 1
ENDOCRINE NEGATIVE: 1
EYES NEGATIVE: 1
GASTROINTESTINAL NEGATIVE: 1
MUSCULOSKELETAL NEGATIVE: 1
NEUROLOGICAL NEGATIVE: 1
CONSTITUTIONAL NEGATIVE: 1
RESPIRATORY NEGATIVE: 1
PSYCHIATRIC NEGATIVE: 1

## 2024-09-05 NOTE — ASSESSMENT & PLAN NOTE
CAD-coronary artery disease-patient has a history of myocardial infarction/stent placed in stenosed coronary arteries. Target LDL should be below 70 milligrams per deciliter. Reviewed EKG which shows no significant changes. Follows with his cardiologist/ Dr. Davis He needs to call us with any new symptoms of angina or shortness of breath. Last stress test was/last coronary catheterization was/. Need to address risk factors Bio[employer] controlling cholesterol blood pressure and diabetes. Educate extensively diet. Patient needs to follow in rehabilitation/mild exercises daily.

## 2024-09-05 NOTE — PROGRESS NOTES
"Subjective   Patient ID: Hammad Blackburn is a 63 y.o. male who presents for Annual Exam (cpe). Ozempic causing diarrhea  - and low blood sugars You should arrive 15 minutes prior to your appointment and check-in at the registration desk. Status Post Left knee TKR -     HPI     Review of Systems   Constitutional: Negative.    HENT: Negative.     Eyes: Negative.    Respiratory: Negative.     Cardiovascular: Negative.    Gastrointestinal: Negative.    Endocrine: Negative.    Musculoskeletal: Negative.    Skin: Negative.    Allergic/Immunologic: Negative.    Neurological: Negative.    Hematological: Negative.    Psychiatric/Behavioral: Negative.     All other systems reviewed and are negative.      Objective   Pulse 68   Resp 17   Ht 1.88 m (6' 2\")   Wt 117 kg (258 lb)   SpO2 98%   BMI 33.13 kg/m²   Blood pressure 130/75, pulse 68, resp. rate 17, height 1.88 m (6' 2\"), weight 117 kg (258 lb), SpO2 98%.   Physical Exam  Vitals and nursing note reviewed.   Constitutional:       Appearance: Normal appearance.   HENT:      Head: Normocephalic and atraumatic.      Right Ear: Tympanic membrane, ear canal and external ear normal.      Left Ear: Tympanic membrane, ear canal and external ear normal. There is no impacted cerumen.      Nose: Nose normal.      Mouth/Throat:      Mouth: Mucous membranes are moist.      Pharynx: Oropharynx is clear.   Eyes:      Extraocular Movements: Extraocular movements intact.      Conjunctiva/sclera: Conjunctivae normal.      Pupils: Pupils are equal, round, and reactive to light.   Cardiovascular:      Rate and Rhythm: Normal rate and regular rhythm.      Pulses: Normal pulses.      Heart sounds: Normal heart sounds. No murmur heard.  Pulmonary:      Effort: Pulmonary effort is normal. No respiratory distress.      Breath sounds: Normal breath sounds. No stridor. No wheezing, rhonchi or rales.   Chest:      Chest wall: No tenderness.   Abdominal:      General: Abdomen is flat. Bowel sounds " are normal. There is no distension.      Palpations: Abdomen is soft. There is no mass.      Tenderness: There is no abdominal tenderness. There is no right CVA tenderness, left CVA tenderness, guarding or rebound.      Hernia: No hernia is present.   Musculoskeletal:         General: Normal range of motion.      Cervical back: Normal range of motion and neck supple.   Skin:     General: Skin is warm.      Capillary Refill: Capillary refill takes less than 2 seconds.   Neurological:      General: No focal deficit present.      Mental Status: He is alert.      Cranial Nerves: No cranial nerve deficit.      Sensory: No sensory deficit.      Motor: No weakness.      Coordination: Coordination normal.      Gait: Gait normal.      Deep Tendon Reflexes: Reflexes normal.   Psychiatric:         Mood and Affect: Mood normal.         Behavior: Behavior normal. Behavior is cooperative.         Thought Content: Thought content normal.         Judgment: Judgment normal.         Assessment/Plan   Problem List Items Addressed This Visit             ICD-10-CM    Benign essential hypertension I10     HTN - hypertension well/controlled .Target BP < 130/80  achieved. Educate low salt diet and exercise with weight loss. Educate home self monitoring and diary keeping. Educate risks of elevate blood pressure and benefits of prompt treatment.  Refill Amlodipine          Diabetes (Multi) E11.9     DM - NIDDM  . Reviewed with patient / Will check  HbA1c and fasting blood sugars. Educate home self monitoring and diary keeping(reviewed with patient home blood sugar levels /diary). Educate extensively low calorie diet and weight loss with exercise. Reviewed BS- diary and Rx. Regimen. [place] renal protection with ARB/ACEI.               Educate compliance with diet and Rx. And educate risks and autcomes.      Refill the Metformin 1000 mg BID with meals  and Ozempic (reduce the dose to 0.5 mg q week) -                                             Relevant Medications    semaglutide (Ozempic) 0.25 mg or 0.5 mg (2 mg/3 mL) pen injector (Start on 9/8/2024)    Other Relevant Orders    Hemoglobin A1C    Lumbosacral spondylosis M47.817     DDD - Degenerative disc disease of the Lumbo-Sacral (LS)/spine. Educate exercises and referred patient to Physical Therapy (PT). Ordered X-Ray's of the LS/ spine. Consider MRI. Radiculopathy in the distribution of L4-L5-S1/ nerve roots, needs NSAIDS (Naprosin 500mg BID vs. Arthrotec 75mg BID or Prednisone taper (Medrol dose pack), Flexeril 10 mg qHS, heat application, and pain control with Tylenol 325 mg TID.            Primary osteoarthritis of left knee M17.12     Pain control and Status Post TKR and therapy now the surgical scar is healing well          Acquired hypothyroidism E03.9     Hypothyroidism - Symptoms well/not controlled (weight gain, fatigue, constipation, cold intolerance). Check TSH continue/change dose of Synthroid/Levothyroxine  of  mcg/qD.          Nonalcoholic steatohepatitis (CELIS) K75.81     Educate extensively low carbohydrate diet AND LOW FAT DIET AND increase in exercise activity  and weight loss program and monitor HbA1C and glucose levels and consider Metformin 500 mg BID with meals            Coronary artery disease involving native coronary artery of native heart with angina pectoris (CMS-HCC) I25.119     CAD-coronary artery disease-patient has a history of myocardial infarction/stent placed in stenosed coronary arteries. Target LDL should be below 70 milligrams per deciliter. Reviewed EKG which shows no significant changes. Follows with his cardiologist/ Dr. Davis He needs to call us with any new symptoms of angina or shortness of breath. Last stress test was/last coronary catheterization was/. Need to address risk factors [employer] controlling cholesterol blood pressure and diabetes. Educate extensively diet. Patient needs to follow in rehabilitation/mild exercises daily.          Annual physical  exam - Primary Z00.00     No recent hospitalizations.    All medications reviewed and reconciled by me the provider..  No use of controlled substances or opiates.    Family history, social history reviewed, no changes.    Patient does not smoke.    Patient does not drink.    Patient hydrates adequately daily.  Eats a well-balanced healthy diet.     Exercises adequately including walking and doing weightbearing exercises.    Patient denies any difficulty with memory or cognition.     Denies any difficulty with hearing.  Patient does not wear hearing aids.    No fall risk.  No recent falls.  Denies any difficulty walking.    Patient with no history of depression anxiety, denies any loss of interest, no feeling of sadness, no lack of motivation.    Patient is independent in all ADLs and IADLs.  Independent bathing, dressing, walking.  Takes care of own finances, shopping and cooking.     End-of-life decision-making power of  reviewed with patient.     Risk Factors Identified During Visit: None.   Influenza: influenza vaccine was previously given.   Pneumovax 23: Pneumovax 23 vaccine was previously given.   Prevnar 13: Prevnar 13 vaccine was previously given.   Shingles Vaccine: Shingles vaccine was previously given.   Prostate cancer screening: Screening is current.   Colorectal Cancer Screening: screening is current.   Abdominal Aortic Aneurysm screening: screening is current.   HIV screening: screening not indicated.       Preventive measures - Recommend ASAP :  Colonoscopy (educate patient risks of colon cancer) refer patient to GI specialist. Ophthalmology and retina exam recommend yearly exams refer patient to an Ophthalmologist. BPH - (Benign Prostatic Hypertrophy) refer patient to an Urologist for rectal exam and PSA check.          Relevant Orders    CBC and Auto Differential    Comprehensive Metabolic Panel    Lipid Panel    TSH with reflex to Free T4 if abnormal    Prostate Specific Antigen       Type  2 diabetes mellitus without complications (Multi) E11.9        DM - NIDDM  . Reviewed with patient / Will check  HbA1c and fasting blood sugars. Educate home self monitoring and diary keeping(reviewed with patient home blood sugar levels /diary). Educate extensively low calorie diet and weight loss with exercise. Reviewed BS- diary and Rx. Regimen. [place] renal protection with ARB/ACEI.               Educate compliance with diet and Rx. And educate risks and autcomes.       Refill the Metformin 500 mg BID with meals                                              Nonalcoholic steatohepatitis (CELIS) K75.81       Educate diet and follow liver function tests           Benign essential hypertension I10          HTN - hypertension well/controlled .Target BP < 130/80  achieved. Educate low salt diet and exercise with weight loss. Educate home self monitoring and diary keeping. Educate risks of elevate blood pressure and benefits of prompt treatment.  Refill Amlodipine            COPD with acute bronchitis (CMS/HCC) J44.0, J20.9        COPD - Educate tobacco cessation extensively , no wheezing, no SOB, no Crackles heard/ Exacerbation.         Get CXR.  Continues mild exercises and inhalers.  [place] preventive care and vaccinations.                                       Add advair inhalers and spiriva inhaler.             Esophageal reflux K21.9        GERD - Acid reflux disease. Rx. PPI (Prilosec/Prevacid/Protonix/Nexium) and educate diet and life style changes. Referred patient to an endoscopy (EGD) and check H. Pylori titers.            Hyperlipidemia E78.5        Hypercholesterolemia - Monitor lipid profile and educate patient upon risks of high cholesterol and targets. Educate diet and change in lifestyle and increase in exercises - Refill: Fenofibrate and  Rosuvastatin and Vascepa  and educate compliance with medication and diet.             Malaise and fatigue R53.81, R53.83        Fatigue - check CMP(metabolic  panel and elctrolytes) , CBC(complete blood cell count), TSH(thyroid function). Insomnia may play a role and sleep studies(rule out sleep apnea) are recommended . Educate sleep hygiene. Consider anxiety disorder vs. depression. Consider Stress test, and 2DECHO.             Lumbosacral spondylosis M47.817        DDD - Degenerative disc disease of the Lumbo-Sacral (LS)/spine. Educate exercises and referred patient to Physical Therapy (PT). Ordered X-Ray's of the LS/ spine. Consider MRI. Radiculopathy in the distribution of L4-L5-S1/ nerve roots, needs NSAIDS (Naprosin 500mg BID vs. Arthrotec 75mg BID or Prednisone taper (Medrol dose pack), Flexeril 10 mg qHS, heat application, and pain control with Tylenol 325 mg TID.              Lumbar radiculopathy, chronic M54.16        DDD - Degenerative disc disease of the Lumbo-Sacral (LS)/spine. Educate exercises and referred patient to Physical Therapy (PT). Ordered X-Ray's of the LS/ spine. Consider MRI. Radiculopathy in the distribution of L4-L5-S1/ nerve roots, needs NSAIDS (Naprosin 500mg BID vs. Arthrotec 75mg BID or Prednisone taper (Medrol dose pack), Flexeril 10 mg qHS, heat application, and pain control with Tylenol 325 mg TID.             Nicotine dependence, cigarettes, uncomplicated F17.210        Tobacco cessation - Educate risks of smoking (CAD/COPD/CANCER of the lung or urinary bladder/CVA). Educate life style changes and prescribe nicotine patch and Wellbutrin 150mg BID. Educate stress reduction.                                                                                        Type 2 diabetes mellitus without complications (CMS/[facility]) E11.9        DM - NIDDM  . Reviewed with patient / Will check  HbA1c and fasting blood sugars. Educate home self monitoring and diary keeping(reviewed with patient home blood sugar levels /diary). Educate extensively low calorie diet and weight loss with exercise. Reviewed BS- diary and Rx. Regimen. [place] renal protection  with ARB/ACEI.               Educate compliance with diet and Rx. And educate risks and autcomes.

## 2024-09-05 NOTE — ASSESSMENT & PLAN NOTE
DM - NIDDM  . Reviewed with patient / Will check  HbA1c and fasting blood sugars. Educate home self monitoring and diary keeping(reviewed with patient home blood sugar levels /diary). Educate extensively low calorie diet and weight loss with exercise. Reviewed BS- diary and Rx. Regimen. [place] renal protection with ARB/ACEI.               Educate compliance with diet and Rx. And educate risks and autcomes.      Refill the Metformin 1000 mg BID with meals  and Ozempic (reduce the dose to 0.5 mg q week) -

## 2024-09-06 ENCOUNTER — LAB (OUTPATIENT)
Dept: LAB | Facility: LAB | Age: 64
End: 2024-09-06
Payer: COMMERCIAL

## 2024-09-06 DIAGNOSIS — Z00.00 ANNUAL PHYSICAL EXAM: ICD-10-CM

## 2024-09-06 DIAGNOSIS — E11.9 TYPE 2 DIABETES MELLITUS WITHOUT COMPLICATION, WITHOUT LONG-TERM CURRENT USE OF INSULIN (MULTI): ICD-10-CM

## 2024-09-06 LAB
ALBUMIN SERPL BCP-MCNC: 4.6 G/DL (ref 3.4–5)
ALP SERPL-CCNC: 58 U/L (ref 33–136)
ALT SERPL W P-5'-P-CCNC: 13 U/L (ref 10–52)
ANION GAP SERPL CALC-SCNC: 13 MMOL/L (ref 10–20)
AST SERPL W P-5'-P-CCNC: 14 U/L (ref 9–39)
BASOPHILS # BLD AUTO: 0.03 X10*3/UL (ref 0–0.1)
BASOPHILS NFR BLD AUTO: 0.4 %
BILIRUB SERPL-MCNC: 0.6 MG/DL (ref 0–1.2)
BUN SERPL-MCNC: 15 MG/DL (ref 6–23)
CALCIUM SERPL-MCNC: 9.6 MG/DL (ref 8.6–10.6)
CHLORIDE SERPL-SCNC: 104 MMOL/L (ref 98–107)
CHOLEST SERPL-MCNC: 86 MG/DL (ref 0–199)
CHOLESTEROL/HDL RATIO: 2
CO2 SERPL-SCNC: 28 MMOL/L (ref 21–32)
CREAT SERPL-MCNC: 0.93 MG/DL (ref 0.5–1.3)
CREAT UR-MCNC: 207.6 MG/DL (ref 20–370)
EGFRCR SERPLBLD CKD-EPI 2021: >90 ML/MIN/1.73M*2
EOSINOPHIL # BLD AUTO: 0.11 X10*3/UL (ref 0–0.7)
EOSINOPHIL NFR BLD AUTO: 1.4 %
ERYTHROCYTE [DISTWIDTH] IN BLOOD BY AUTOMATED COUNT: 13.4 % (ref 11.5–14.5)
EST. AVERAGE GLUCOSE BLD GHB EST-MCNC: 117 MG/DL
GLUCOSE SERPL-MCNC: 99 MG/DL (ref 74–99)
HBA1C MFR BLD: 5.7 %
HCT VFR BLD AUTO: 42 % (ref 41–52)
HDLC SERPL-MCNC: 42.6 MG/DL
HGB BLD-MCNC: 13.2 G/DL (ref 13.5–17.5)
IMM GRANULOCYTES # BLD AUTO: 0.02 X10*3/UL (ref 0–0.7)
IMM GRANULOCYTES NFR BLD AUTO: 0.2 % (ref 0–0.9)
LDLC SERPL CALC-MCNC: 14 MG/DL
LYMPHOCYTES # BLD AUTO: 1.6 X10*3/UL (ref 1.2–4.8)
LYMPHOCYTES NFR BLD AUTO: 19.8 %
MCH RBC QN AUTO: 28 PG (ref 26–34)
MCHC RBC AUTO-ENTMCNC: 31.4 G/DL (ref 32–36)
MCV RBC AUTO: 89 FL (ref 80–100)
MICROALBUMIN UR-MCNC: 446.7 MG/L
MICROALBUMIN/CREAT UR: 215.2 UG/MG CREAT
MONOCYTES # BLD AUTO: 0.37 X10*3/UL (ref 0.1–1)
MONOCYTES NFR BLD AUTO: 4.6 %
NEUTROPHILS # BLD AUTO: 5.94 X10*3/UL (ref 1.2–7.7)
NEUTROPHILS NFR BLD AUTO: 73.6 %
NON HDL CHOLESTEROL: 43 MG/DL (ref 0–149)
NRBC BLD-RTO: 0 /100 WBCS (ref 0–0)
PLATELET # BLD AUTO: 210 X10*3/UL (ref 150–450)
POTASSIUM SERPL-SCNC: 4.2 MMOL/L (ref 3.5–5.3)
PROT SERPL-MCNC: 6.9 G/DL (ref 6.4–8.2)
PSA SERPL-MCNC: 1.26 NG/ML
RBC # BLD AUTO: 4.72 X10*6/UL (ref 4.5–5.9)
SODIUM SERPL-SCNC: 141 MMOL/L (ref 136–145)
TRIGL SERPL-MCNC: 149 MG/DL (ref 0–149)
TSH SERPL-ACNC: 3.19 MIU/L (ref 0.44–3.98)
VLDL: 30 MG/DL (ref 0–40)
WBC # BLD AUTO: 8.1 X10*3/UL (ref 4.4–11.3)

## 2024-09-06 PROCEDURE — 84153 ASSAY OF PSA TOTAL: CPT

## 2024-09-06 PROCEDURE — 85025 COMPLETE CBC W/AUTO DIFF WBC: CPT

## 2024-09-06 PROCEDURE — 82570 ASSAY OF URINE CREATININE: CPT

## 2024-09-06 PROCEDURE — 80053 COMPREHEN METABOLIC PANEL: CPT

## 2024-09-06 PROCEDURE — 82043 UR ALBUMIN QUANTITATIVE: CPT

## 2024-09-06 PROCEDURE — 36415 COLL VENOUS BLD VENIPUNCTURE: CPT

## 2024-09-06 PROCEDURE — 80061 LIPID PANEL: CPT

## 2024-09-06 PROCEDURE — 83036 HEMOGLOBIN GLYCOSYLATED A1C: CPT

## 2024-09-06 PROCEDURE — 84443 ASSAY THYROID STIM HORMONE: CPT

## 2024-09-10 DIAGNOSIS — M47.27 OSTEOARTHRITIS OF SPINE WITH RADICULOPATHY, LUMBOSACRAL REGION: ICD-10-CM

## 2024-09-10 RX ORDER — PREDNISONE 10 MG/1
TABLET ORAL
Qty: 30 TABLET | Refills: 0 | Status: SHIPPED | OUTPATIENT
Start: 2024-09-10 | End: 2024-09-20

## 2024-09-10 NOTE — TELEPHONE ENCOUNTER
Start Flexeril 10 mg q1HS and Prednisone 10 mg II BID for 3 days and II qD for 3 days and I qD for 3 days -

## 2024-09-11 ENCOUNTER — DOCUMENTATION (OUTPATIENT)
Dept: PHYSICAL THERAPY | Facility: CLINIC | Age: 64
End: 2024-09-11
Payer: COMMERCIAL

## 2024-09-11 ENCOUNTER — TREATMENT (OUTPATIENT)
Dept: PHYSICAL THERAPY | Facility: CLINIC | Age: 64
End: 2024-09-11
Payer: COMMERCIAL

## 2024-09-11 DIAGNOSIS — M17.12 ARTHRITIS OF LEFT KNEE: ICD-10-CM

## 2024-09-11 NOTE — PROGRESS NOTES
Physical Therapy                 Therapy Communication Note    Patient Name: Hammad Blackburn  MRN: [id number]  Department:   Physical Therapy  Today's Date: 9/11/2024     Discipline: Physical Therapy    Missed Time: Cancel    Comment: Pt arrived at therapy appointment stating new onset of severe back pain. He reported that he does not want to participate in therapy this date but did want to speak with PT as he has been having posterior L knee pain and was concerns. Pt demonstrated good ROM and muscle control, palpable tightness along popliteus/plantaris region, no evidence of DVT at this time. PT educated pt on gentle stretching and use of heat, offered manual therapy techniques including dry needling to help with pain however pt declined at this time due to concern for his back, will assess again at next session. No charge as formal session was not performed.

## 2024-09-17 DIAGNOSIS — K21.9 GASTROESOPHAGEAL REFLUX DISEASE WITHOUT ESOPHAGITIS: ICD-10-CM

## 2024-09-17 RX ORDER — OMEPRAZOLE 40 MG/1
40 CAPSULE, DELAYED RELEASE ORAL
Qty: 90 CAPSULE | Refills: 0 | Status: SHIPPED | OUTPATIENT
Start: 2024-09-17

## 2024-09-18 ENCOUNTER — APPOINTMENT (OUTPATIENT)
Dept: PHYSICAL THERAPY | Facility: CLINIC | Age: 64
End: 2024-09-18
Payer: COMMERCIAL

## 2024-09-21 DIAGNOSIS — E78.49 OTHER HYPERLIPIDEMIA: ICD-10-CM

## 2024-09-21 DIAGNOSIS — I10 BENIGN ESSENTIAL HYPERTENSION: ICD-10-CM

## 2024-09-23 RX ORDER — AMLODIPINE BESYLATE 10 MG/1
10 TABLET ORAL DAILY
Qty: 90 TABLET | Refills: 0 | Status: SHIPPED | OUTPATIENT
Start: 2024-09-23

## 2024-09-23 RX ORDER — FENOFIBRATE 145 MG/1
145 TABLET, FILM COATED ORAL DAILY
Qty: 90 TABLET | Refills: 0 | Status: SHIPPED | OUTPATIENT
Start: 2024-09-23

## 2024-09-25 ENCOUNTER — APPOINTMENT (OUTPATIENT)
Dept: PHYSICAL THERAPY | Facility: CLINIC | Age: 64
End: 2024-09-25
Payer: COMMERCIAL

## 2024-09-30 DIAGNOSIS — E11.9 TYPE 2 DIABETES MELLITUS WITHOUT COMPLICATION, WITHOUT LONG-TERM CURRENT USE OF INSULIN (MULTI): ICD-10-CM

## 2024-09-30 PROCEDURE — RXMED WILLOW AMBULATORY MEDICATION CHARGE

## 2024-09-30 RX ORDER — SEMAGLUTIDE 0.68 MG/ML
0.5 INJECTION, SOLUTION SUBCUTANEOUS
Qty: 3 ML | Refills: 0 | Status: SHIPPED | OUTPATIENT
Start: 2024-09-30

## 2024-10-01 ENCOUNTER — PHARMACY VISIT (OUTPATIENT)
Dept: PHARMACY | Facility: CLINIC | Age: 64
End: 2024-10-01
Payer: MEDICARE

## 2024-10-05 DIAGNOSIS — E11.9 TYPE 2 DIABETES MELLITUS WITHOUT COMPLICATION, WITHOUT LONG-TERM CURRENT USE OF INSULIN (MULTI): ICD-10-CM

## 2024-10-07 RX ORDER — METFORMIN HYDROCHLORIDE 500 MG/1
TABLET ORAL
Qty: 360 TABLET | Refills: 1 | Status: SHIPPED | OUTPATIENT
Start: 2024-10-07

## 2024-10-14 ENCOUNTER — DOCUMENTATION (OUTPATIENT)
Dept: PHYSICAL THERAPY | Facility: CLINIC | Age: 64
End: 2024-10-14
Payer: COMMERCIAL

## 2024-10-14 NOTE — PROGRESS NOTES
Physical Therapy    Discharge Summary    Name: Hammad Blackburn  MRN: [id number]  : 1960  Date: 10/14/24    Discharge Summary: PT    Discharge Information: Date of discharge 10/14/204, Date of last visit 24, and Date of evaluation 24    Therapy Summary: Pt was challenged with therapy interventions including reduction in extension following surgery, at last session pt reported significant increase in pain of knee and back. Pt failed to continue with therapy despite continued post-op limitations.     Discharge Status: functional limitations remained     Rehab Discharge Reason: Failed to schedule and/or keep follow-up appointment(s)

## 2024-10-15 ENCOUNTER — OFFICE VISIT (OUTPATIENT)
Dept: CARDIOLOGY | Facility: HOSPITAL | Age: 64
End: 2024-10-15
Payer: COMMERCIAL

## 2024-10-15 VITALS
WEIGHT: 261.2 LBS | OXYGEN SATURATION: 95 % | BODY MASS INDEX: 32.48 KG/M2 | DIASTOLIC BLOOD PRESSURE: 60 MMHG | SYSTOLIC BLOOD PRESSURE: 120 MMHG | HEIGHT: 75 IN | HEART RATE: 66 BPM

## 2024-10-15 DIAGNOSIS — Z72.0 TOBACCO USE: ICD-10-CM

## 2024-10-15 DIAGNOSIS — I10 BENIGN ESSENTIAL HYPERTENSION: ICD-10-CM

## 2024-10-15 DIAGNOSIS — I25.10 CORONARY ARTERY DISEASE INVOLVING NATIVE CORONARY ARTERY OF NATIVE HEART WITHOUT ANGINA PECTORIS: Primary | ICD-10-CM

## 2024-10-15 DIAGNOSIS — E78.00 PURE HYPERCHOLESTEROLEMIA: ICD-10-CM

## 2024-10-15 PROCEDURE — 4004F PT TOBACCO SCREEN RCVD TLK: CPT | Performed by: NURSE PRACTITIONER

## 2024-10-15 PROCEDURE — 99214 OFFICE O/P EST MOD 30 MIN: CPT | Performed by: NURSE PRACTITIONER

## 2024-10-15 PROCEDURE — 3048F LDL-C <100 MG/DL: CPT | Performed by: NURSE PRACTITIONER

## 2024-10-15 PROCEDURE — 3078F DIAST BP <80 MM HG: CPT | Performed by: NURSE PRACTITIONER

## 2024-10-15 PROCEDURE — 99406 BEHAV CHNG SMOKING 3-10 MIN: CPT | Performed by: NURSE PRACTITIONER

## 2024-10-15 PROCEDURE — 3008F BODY MASS INDEX DOCD: CPT | Performed by: NURSE PRACTITIONER

## 2024-10-15 PROCEDURE — 3044F HG A1C LEVEL LT 7.0%: CPT | Performed by: NURSE PRACTITIONER

## 2024-10-15 PROCEDURE — 3074F SYST BP LT 130 MM HG: CPT | Performed by: NURSE PRACTITIONER

## 2024-10-15 PROCEDURE — 3062F POS MACROALBUMINURIA REV: CPT | Performed by: NURSE PRACTITIONER

## 2024-10-15 RX ORDER — ASPIRIN 81 MG/1
81 TABLET ORAL DAILY
COMMUNITY

## 2024-10-15 RX ORDER — GLUCOSAM/CHONDRO/HERB 149/HYAL 750-100 MG
2000 TABLET ORAL DAILY
COMMUNITY

## 2024-10-15 NOTE — PATIENT INSTRUCTIONS
Continue current meds  Continue physical activity and weight loss efforts  Stop smoking- consider smoking cessation program, nicotine patches, or Acculaser in [place]  Follow up in April   Call sooner questions and concerns

## 2024-10-15 NOTE — PROGRESS NOTES
Primary Care Physician: Richard Garcia MD  Date of Visit: 10/15/2024 10:00 AM EDT  Location of visit: [facility]     Chief Complaint:   No chief complaint on file.       HPI / Summary:   Hammad Blackburn is a 63 y.o. male presents for followup. Seen in collaboration with Dr. Davis. He has been walking 2 miles three times a week without chest pain or dyspnea. His chronic lower extremity is at baseline. He has intentionally been losing weight. He did quit smoking for four months and has since restarted smoking 5-6 cigarettes per day. He was unable to tolerate CPAP. Denies chest pain, dyspnea, orthopnea, pnd, lightheadedness, dizziness, syncope, palpitations, or bleeding issues.              Past Medical History:  Past Medical History:   Diagnosis Date    Abnormal stress test     followed by heart cath    Anxiety disorder, unspecified     Arthritis     Cardiology follow-up encounter     Scheduled with JACOBO aLy NP for 4/11/2024    COPD (chronic obstructive pulmonary disease) (Multi)     Coronary atherosclerosis     Diabetes mellitus (Multi)     Elevated prostate specific antigen (PSA)     GERD (gastroesophageal reflux disease)     H/O percutaneous left heart catheterization 03/11/2024    1. Severe branch vessel CAD (D1 and OM1) in a right dominant system.  2.  of small inferior branch of OM1.  3. Elevated LVEDP.  4. No evidence of aortic stenosis.    History of depression     Hyperlipidemia     Hypertension     Nephrolithiasis     Sleep apnea     Tobacco use     Unilateral primary osteoarthritis, left knee         Past Surgical History:  Past Surgical History:   Procedure Laterality Date    ADENOIDECTOMY      APPENDECTOMY      CARDIAC CATHETERIZATION N/A 03/11/2024    Procedure: Left Heart Cath with Coronary Angiography and LV;  Surgeon: Alan Davis MD;  Location: [facility];  Service: Cardiovascular;  Laterality: N/A;  Scheduled 3/11/24 @ 10:30 am    COLONOSCOPY      HERNIA REPAIR      KNEE  SURGERY      Knee Arthroscopy With Medial And Lateral Meniscus Repair    TONSILLECTOMY            Social History:   reports that he has been smoking cigarettes. He started smoking about 43 years ago. He has a 21.9 pack-year smoking history. He has never used smokeless tobacco. He reports current alcohol use. He reports that he does not use drugs.     Family History:  family history includes COPD in his mother; Coronary artery disease (age of onset: 40 - 49) in his father; Diabetes in his father; Hypertension in his father.      Allergies:  No Known Allergies    Outpatient Medications:  Current Outpatient Medications   Medication Instructions    allopurinol (ZYLOPRIM) 300 mg, oral, Daily    amLODIPine (NORVASC) 10 mg, oral, Daily    aspirin 81 mg, oral, Daily    budesonide-formoteroL (Symbicort) 160-4.5 mcg/actuation inhaler 2 puffs, inhalation, 2 times daily PRN    carvedilol (COREG) 12.5 mg, oral, 2 times daily (morning and late afternoon)    co-enzyme Q-10 30 mg capsule 1 capsule, oral, Daily    cyclobenzaprine (FLEXERIL) 10 mg, oral, 3 times daily PRN    fenofibrate (TRICOR) 145 mg, oral, Daily, Take with food.    fluticasone (Flonase) 50 mcg/actuation nasal spray 1 spray, Each Nostril, 2 times daily PRN    folic acid (FOLVITE) 1 mg, oral, Daily    icosapent ethyL (VASCEPA) 2 g, oral, 2 times daily (morning and late afternoon)    metFORMIN (Glucophage) 500 mg tablet TAKE 2 TABLETS (1,000 MG) BY MOUTH 2 TIMES A DAY. HOLD FOR 48 HOURS DUE TO CONTRAST    omega 3-dha-epa-fish oil (Fish OiL) 1,000 (120-180) mg capsule 2,000 mg, oral, Daily    omeprazole (PRILOSEC) 40 mg, oral, Daily before breakfast, Do not crush or chew.    ondansetron (ZOFRAN) 4 mg, oral, Every 8 hours PRN    OneTouch Delica Plus Lancet 30 gauge misc 1 Lancet, Topical, Daily    OneTouch Ultra Test strip 1 strip, miscellaneous, 2 times daily    Ozempic 0.5 mg, subcutaneous, Once Weekly    pantoprazole (ProtoNix) 40 mg EC tablet TAKE 1 TABLET (40 MG)  "BY MOUTH ONCE DAILY IN THE MORNING BEFORE A MEAL. DO NOT CRUSH, CHEW, OR SPLIT    Repatha SureClick 140 mg, subcutaneous, Every 14 days    rosuvastatin (CRESTOR) 40 mg, oral, Daily    triamcinolone (Kenalog) 0.1 % ointment Triamcinolone Acetonide 0.1 % External Ointment   Quantity: 80  Refills: 0        Start : 20-Aug-2020   Active       Physical Exam:  Vitals:    10/15/24 0953   BP: 143/82   BP Location: Right arm   Patient Position: Sitting   Pulse: 66   SpO2: 95%   Weight: 118 kg (261 lb 3.2 oz)   Height: 1.905 m (6' 3\")     Wt Readings from Last 5 Encounters:   10/15/24 118 kg (261 lb 3.2 oz)   09/05/24 117 kg (258 lb)   06/17/24 126 kg (277 lb 9 oz)   06/10/24 125 kg (276 lb 0.3 oz)   04/18/24 127 kg (281 lb)     Body mass index is 32.65 kg/m².   GENERAL: alert, cooperative, pleasant, in no acute distress  SKIN: warm and dry  NECK: Normal JVD, negative HJR  CARDIAC: Regular rate and rhythm with no rubs, murmurs, or gallops  CHEST: Normal respiratory efforts, lungs clear to auscultation bilaterally.  ABDOMEN: soft, nontender, nondistended  EXTREMITIES: Trivial bilateral lower extremity edema at sock line, +2 palpable RP and PT pulses bilaterally       Last Labs:  Recent Labs     09/06/24  0845 06/18/24  0547 06/10/24  1029   WBC 8.1 13.2* 6.8   HGB 13.2* 11.9* 14.0   HCT 42.0 36.9* 41.9    198 188   MCV 89 90 90     Recent Labs     09/06/24  0845 06/18/24  0547 06/10/24  1029    138 139   K 4.2 4.3 4.5    102 103   BUN 15 22 23   CREATININE 0.93 0.99 0.92     CMP -  Lab Results   Component Value Date    CALCIUM 9.6 09/06/2024    PROT 6.9 09/06/2024    ALBUMIN 4.6 09/06/2024    AST 14 09/06/2024    ALT 13 09/06/2024    ALKPHOS 58 09/06/2024    BILITOT 0.6 09/06/2024       LIPID PANEL -   Lab Results   Component Value Date    CHOL 86 09/06/2024    HDL 42.6 09/06/2024    LDLF 92 03/24/2023    TRIG 149 09/06/2024   LDL 14 9/6/24    Lab Results   Component Value Date    BNP 9 01/30/2019    HGBA1C " 5.7 (H) 09/06/2024       Last Cardiology Tests:  ECG:  Reviewed EKG from 3/7/24- normal sinus rhythm HR 68    Echo:     Echocardiogram February 15, 2024.  Normal LV function with an ejection fraction of 65 to 70%.  Moderate concentric left ventricular hypertrophy septum 1.3 cm and a posterior wall measurement of 1.5 cm  Sclerotic aortic valve without significant stenosis  Mild mitral regurgitation  Mild to moderate tricuspid regurgitation        Echo Results:  1/31/2019   CONCLUSIONS:   1. The left ventricular systolic function is normal with a 60-65% estimated ejection fraction.   2. Moderately increased left ventricular septal thickness.   3. Spectral Doppler shows an impaired relaxation pattern of left ventricular diastolic filling.   4. RVSP within normal limits.   5. Aortic valve stenosis is not present.   6. The pulmonary artery is not well visualized.        Cath:  Coronary Lesion Summary:  Vessel            Stenosis      Vessel Segment  LAD           40-50% stenosis         mid  1st Diagonal    40% stenosis    proximal to mid  1st Diagonal    80% stenosis          mid  OM 1            70% stenosis       proximal  Ramus        10 to 30% stenosis    proximal  RCA             30% stenosis        distal     CONCLUSIONS:   1. Severe branch vessel CAD (D1 and OM1) in a right dominant system.   2.  of small inferior branch of OM1.   3. Elevated LVEDP.   4. No evidence of aortic stenosis.     Stress Test:   Lexiscan nuclear stress test February 14, 2024  Small area of ischemia in the apical lateral wall with basal lateral scar.  Normal LV function with an ejection fraction of 60%.  No angina or arrhythmias or ischemic EKG changes with pharmacological stress.  Mild resting hypertension.           Cardiac Imaging:  CT lung screening 5/18/2023  HEART AND VESSELS:   The thoracic aorta normal in course and caliber.There is mild   scattered calcified atherosclerosis present.   Main pulmonary artery and its branches are  normal in caliber.   Severe coronary artery calcifications are seen. Please note,the study   is not optimized for evaluation of coronary arteries.   The cardiac chambers are not enlarged.   There is no pericardial effusion seen.   Impression   1. 2 mm right lower lobe nodule, likely benign. Continued screening  with low-dose noncontrast chest CT in 12 months (from current date)  is recommended.  2. Mild upper lung predominant emphysema.  3. Severe coronary artery calcification. Correlation with coronary  artery disease risk factors.  4. Diffuse hepatic steatosis. PCP evaluation is recommended.           Assessment/Plan   Problem List Items Addressed This Visit          Cardiac and Vasculature    Benign essential hypertension    Hyperlipidemia       Tobacco    Tobacco use    Relevant Orders    Referral to Tobacco Cessation Counseling     Other Visit Diagnoses       Coronary artery disease involving native coronary artery of native heart without angina pectoris    -  Primary          In summary Mr. Blackburn is a pleasant 63-year-old white male with a past medical history significant for severe coronary atherosclerosis on CT with severe branch vessel coronary disease by coronary angiography, type II non-insulin-requiring diabetes, hypertension, dyslipidemia, obstructive sleep apnea, obesity, COPD, and chronic tobacco use. He is seemingly asymptomatic from a cardiac perspective. His blood pressure and lipid panel are at goal. I have encouraged him continue weight loss efforts and physical activity. I strongly encouraged him to stop smoking and spent more than 3 minutes of time counseling him to do so. I have recommended smoking cessation program, nicotine patches, or consider acculaser treatment. He should continue his other cardiovascular medications. He will follow up in 6 months.       Orders:  No orders of the defined types were placed in this encounter.     Followup Appts:  Future Appointments   Date Time Provider  Department [place]   4/15/2025 10:00 AM Alan Davis MD [id number] [facility]   6/10/2025  1:15 PM Jung Plunkett MD [id number] [facility]           ____________________________________________________________  Noreen Lay APRN-CNP  [facility]  [facility]

## 2024-10-27 DIAGNOSIS — E11.9 TYPE 2 DIABETES MELLITUS WITHOUT COMPLICATION, WITHOUT LONG-TERM CURRENT USE OF INSULIN (MULTI): ICD-10-CM

## 2024-10-28 PROCEDURE — RXMED WILLOW AMBULATORY MEDICATION CHARGE

## 2024-10-28 RX ORDER — SEMAGLUTIDE 0.68 MG/ML
0.5 INJECTION, SOLUTION SUBCUTANEOUS
Qty: 3 ML | Refills: 2 | Status: SHIPPED | OUTPATIENT
Start: 2024-11-03

## 2024-10-29 ENCOUNTER — PHARMACY VISIT (OUTPATIENT)
Dept: PHARMACY | Facility: CLINIC | Age: 64
End: 2024-10-29
Payer: MEDICARE

## 2024-11-04 DIAGNOSIS — M47.816 SPONDYLOSIS WITHOUT MYELOPATHY OR RADICULOPATHY, LUMBAR REGION: ICD-10-CM

## 2024-11-04 RX ORDER — CYCLOBENZAPRINE HCL 10 MG
10 TABLET ORAL 3 TIMES DAILY PRN
Qty: 90 TABLET | Refills: 0 | Status: SHIPPED | OUTPATIENT
Start: 2024-11-04

## 2024-11-05 ENCOUNTER — TELEPHONE (OUTPATIENT)
Dept: PRIMARY CARE | Facility: CLINIC | Age: 64
End: 2024-11-05
Payer: COMMERCIAL

## 2024-11-05 NOTE — TELEPHONE ENCOUNTER
Patient stated that order for cyclobenzaprine for back concerned said that he thought that steroid was to be sent in as well please call [phone]

## 2024-11-05 NOTE — TELEPHONE ENCOUNTER
Returned call. Explained that the doctor will not send in prednisone without an appointment. Appointment made

## 2024-11-10 DIAGNOSIS — R53.83 FATIGUE, UNSPECIFIED TYPE: ICD-10-CM

## 2024-11-11 ENCOUNTER — APPOINTMENT (OUTPATIENT)
Dept: PRIMARY CARE | Facility: CLINIC | Age: 64
End: 2024-11-11
Payer: COMMERCIAL

## 2024-11-11 VITALS
HEIGHT: 75 IN | OXYGEN SATURATION: 96 % | DIASTOLIC BLOOD PRESSURE: 90 MMHG | RESPIRATION RATE: 19 BRPM | BODY MASS INDEX: 32.33 KG/M2 | WEIGHT: 260 LBS | HEART RATE: 60 BPM | SYSTOLIC BLOOD PRESSURE: 140 MMHG

## 2024-11-11 DIAGNOSIS — M54.16 LUMBAR RADICULOPATHY, CHRONIC: Primary | ICD-10-CM

## 2024-11-11 DIAGNOSIS — R53.83 MALAISE AND FATIGUE: ICD-10-CM

## 2024-11-11 DIAGNOSIS — M47.27 OSTEOARTHRITIS OF SPINE WITH RADICULOPATHY, LUMBOSACRAL REGION: ICD-10-CM

## 2024-11-11 DIAGNOSIS — K21.9 GASTROESOPHAGEAL REFLUX DISEASE WITHOUT ESOPHAGITIS: ICD-10-CM

## 2024-11-11 DIAGNOSIS — E11.9 TYPE 2 DIABETES MELLITUS WITHOUT COMPLICATION, WITHOUT LONG-TERM CURRENT USE OF INSULIN (MULTI): ICD-10-CM

## 2024-11-11 DIAGNOSIS — K75.81 NONALCOHOLIC STEATOHEPATITIS (NASH): ICD-10-CM

## 2024-11-11 DIAGNOSIS — I10 BENIGN ESSENTIAL HYPERTENSION: ICD-10-CM

## 2024-11-11 DIAGNOSIS — R53.81 MALAISE AND FATIGUE: ICD-10-CM

## 2024-11-11 DIAGNOSIS — I25.119 CORONARY ARTERY DISEASE INVOLVING NATIVE CORONARY ARTERY OF NATIVE HEART WITH ANGINA PECTORIS: ICD-10-CM

## 2024-11-11 PROCEDURE — 3008F BODY MASS INDEX DOCD: CPT | Performed by: INTERNAL MEDICINE

## 2024-11-11 PROCEDURE — 3077F SYST BP >= 140 MM HG: CPT | Performed by: INTERNAL MEDICINE

## 2024-11-11 PROCEDURE — 3048F LDL-C <100 MG/DL: CPT | Performed by: INTERNAL MEDICINE

## 2024-11-11 PROCEDURE — 3062F POS MACROALBUMINURIA REV: CPT | Performed by: INTERNAL MEDICINE

## 2024-11-11 PROCEDURE — 99214 OFFICE O/P EST MOD 30 MIN: CPT | Performed by: INTERNAL MEDICINE

## 2024-11-11 PROCEDURE — 3080F DIAST BP >= 90 MM HG: CPT | Performed by: INTERNAL MEDICINE

## 2024-11-11 PROCEDURE — 3044F HG A1C LEVEL LT 7.0%: CPT | Performed by: INTERNAL MEDICINE

## 2024-11-11 PROCEDURE — 4004F PT TOBACCO SCREEN RCVD TLK: CPT | Performed by: INTERNAL MEDICINE

## 2024-11-11 RX ORDER — GABAPENTIN 100 MG/1
100 CAPSULE ORAL 3 TIMES DAILY
Qty: 90 CAPSULE | Refills: 5 | Status: SHIPPED | OUTPATIENT
Start: 2024-11-11 | End: 2025-05-10

## 2024-11-11 RX ORDER — CYCLOBENZAPRINE HCL 10 MG
10 TABLET ORAL NIGHTLY PRN
Qty: 30 TABLET | Refills: 2 | Status: SHIPPED | OUTPATIENT
Start: 2024-11-11 | End: 2025-07-09

## 2024-11-11 RX ORDER — FOLIC ACID 1 MG/1
1 TABLET ORAL DAILY
Qty: 90 TABLET | Refills: 0 | Status: SHIPPED | OUTPATIENT
Start: 2024-11-11

## 2024-11-11 RX ORDER — PREDNISONE 10 MG/1
TABLET ORAL
Qty: 30 TABLET | Refills: 0 | Status: SHIPPED | OUTPATIENT
Start: 2024-11-11 | End: 2024-11-21

## 2024-11-11 ASSESSMENT — ENCOUNTER SYMPTOMS
RESPIRATORY NEGATIVE: 1
ENDOCRINE NEGATIVE: 1
BACK PAIN: 1
CONSTITUTIONAL NEGATIVE: 1
EYES NEGATIVE: 1
GASTROINTESTINAL NEGATIVE: 1
NEUROLOGICAL NEGATIVE: 1
PSYCHIATRIC NEGATIVE: 1
HEMATOLOGIC/LYMPHATIC NEGATIVE: 1
CARDIOVASCULAR NEGATIVE: 1
ALLERGIC/IMMUNOLOGIC NEGATIVE: 1

## 2024-11-11 NOTE — PROGRESS NOTES
"Subjective   Patient ID: Hammad Blackburn is a 63 y.o. male who presents for Back Pain (Low back pain). Sudden onset of low back pain upon strin of lifting 20 Lb and twisting around fro the past 7 days and not getting any better still in pain     Back Pain    Sudden onset of low back pain upon strin of lifting 20 Lb and twisting around fro the past 7 days and not getting any better still in pain      Review of Systems   Constitutional: Negative.    HENT: Negative.     Eyes: Negative.    Respiratory: Negative.     Cardiovascular: Negative.    Gastrointestinal: Negative.    Endocrine: Negative.    Musculoskeletal:  Positive for back pain.   Skin: Negative.    Allergic/Immunologic: Negative.    Neurological: Negative.    Hematological: Negative.    Psychiatric/Behavioral: Negative.     All other systems reviewed and are negative.      Objective   Pulse 60   Resp 19   Ht 1.905 m (6' 3\")   Wt 118 kg (260 lb)   SpO2 96%   BMI 32.50 kg/m²   Blood pressure 140/90, pulse 60, resp. rate 19, height 1.905 m (6' 3\"), weight 118 kg (260 lb), SpO2 96%.   Physical Exam  Vitals and nursing note reviewed.   Constitutional:       Appearance: Normal appearance.   HENT:      Head: Normocephalic and atraumatic.      Right Ear: Tympanic membrane, ear canal and external ear normal.      Left Ear: Tympanic membrane, ear canal and external ear normal. There is no impacted cerumen.      Nose: Nose normal.      Mouth/Throat:      Mouth: Mucous membranes are moist.      Pharynx: Oropharynx is clear.   Eyes:      Extraocular Movements: Extraocular movements intact.      Conjunctiva/sclera: Conjunctivae normal.      Pupils: Pupils are equal, round, and reactive to light.   Cardiovascular:      Rate and Rhythm: Normal rate and regular rhythm.      Pulses: Normal pulses.      Heart sounds: Normal heart sounds. No murmur heard.  Pulmonary:      Effort: Pulmonary effort is normal. No respiratory distress.      Breath sounds: Normal breath sounds. No " stridor. No wheezing, rhonchi or rales.   Chest:      Chest wall: No tenderness.   Abdominal:      General: Abdomen is flat. Bowel sounds are normal. There is no distension.      Palpations: Abdomen is soft. There is no mass.      Tenderness: There is no abdominal tenderness. There is no right CVA tenderness, left CVA tenderness, guarding or rebound.      Hernia: No hernia is present.   Musculoskeletal:         General: Normal range of motion.      Cervical back: Normal range of motion and neck supple.   Skin:     General: Skin is warm.      Capillary Refill: Capillary refill takes less than 2 seconds.   Neurological:      General: No focal deficit present.      Mental Status: He is alert.      Cranial Nerves: No cranial nerve deficit.      Sensory: No sensory deficit.      Motor: No weakness.      Coordination: Coordination normal.      Gait: Gait normal.      Deep Tendon Reflexes: Reflexes normal.   Psychiatric:         Mood and Affect: Mood normal.         Behavior: Behavior normal. Behavior is cooperative.         Thought Content: Thought content normal.         Judgment: Judgment normal.         Assessment/Plan   Problem List Items Addressed This Visit             ICD-10-CM    Benign essential hypertension I10     HTN - hypertension well/controlled .Target BP < 130/80 achieved. Educate low salt diet and exercise with weight loss. Educate home self monitoring and diary keeping. Educate risks of elevate blood pressure and benefits of prompt treatment. Refill Amlodipine          Esophageal reflux K21.9     GERD - Acid reflux disease. Rx. PPI (Prilosec/Prevacid/Protonix/Nexium) and educate diet and life style changes. Referred patient to an endoscopy (EGD) and check H. Pylori titers.          Malaise and fatigue R53.81, R53.83     Fatigue - check CMP(metabolic panel and elctrolytes) , CBC(complete blood cell count), TSH(thyroid function). Insomnia may play a role and sleep studies(rule out sleep apnea) are  recommended . Educate sleep hygiene. Consider anxiety disorder vs. depression. Consider Stress test, and 2DECHO.            Lumbosacral spondylosis M47.817     DDD - Degenerative disc disease of the Lumbo-Sacral (LS)/spine. Educate exercises and referred patient to Physical Therapy (PT). Ordered X-Ray's of the LS/ spine. Consider MRI. Radiculopathy in the distribution of L4-L5-S1/ nerve roots, needs NSAIDS (Naprosin 500mg BID vs. Arthrotec 75mg BID or Prednisone taper (Medrol dose pack), Flexeril 10 mg qHS, heat application, and pain control with Tylenol 325 mg TID.            Relevant Medications    cyclobenzaprine (Flexeril) 10 mg tablet    predniSONE (Deltasone) 10 mg tablet    gabapentin (Neurontin) 100 mg capsule    Lumbar radiculopathy, chronic - Primary M54.16     DDD - Degenerative disc disease of the Lumbo-Sacral (LS)/spine. Educate exercises and referred patient to Physical Therapy (PT). Ordered X-Ray's of the LS/ spine. Consider MRI. Radiculopathy in the distribution of L4-L5-S1/ nerve roots, needs NSAIDS (Naprosin 500mg BID vs. Arthrotec 75mg BID or Prednisone taper (Medrol dose pack), Flexeril 10 mg qHS, heat application, and pain control with Tylenol 325 mg TID.           Relevant Medications    cyclobenzaprine (Flexeril) 10 mg tablet    predniSONE (Deltasone) 10 mg tablet    gabapentin (Neurontin) 100 mg capsule    Type 2 diabetes mellitus without complications (Multi) E11.9     DM - NIDDM  . Reviewed with patient / Will check  HbA1c and fasting blood sugars. Educate home self monitoring and diary keeping(reviewed with patient home blood sugar levels /diary). Educate extensively low calorie diet and weight loss with exercise. Reviewed BS- diary and Rx. Regimen. [place] renal protection with ARB/ACEI.               Educate compliance with diet and Rx. And educate risks and autcomes.       Refill the Metformin 500 mg BID with meals                                    Nonalcoholic steatohepatitis (CELIS)  K75.81     Educate extensively low carbohydrate diet AND LOW FAT DIET AND increase in exercise activity and weight loss program and monitor HbA1C and glucose levels and consider Metformin 500 mg BID with meals          Coronary artery disease involving native coronary artery of native heart with angina pectoris I25.119     CAD-coronary artery disease-patient has a history of myocardial infarction/stent placed in stenosed coronary arteries. Target LDL should be below 70 milligrams per deciliter. Reviewed EKG which shows no significant changes. Follows with his cardiologist/ Dr. Davis He needs to call us with any new symptoms of angina or shortness of breath. Last stress test was/last coronary catheterization was/. Need to address risk factors BioCore controlling cholesterol blood pressure and diabetes. Educate extensively diet. Patient needs to follow in rehabilitation/mild exercises daily.             Benign essential hypertension I10        HTN - hypertension well/controlled .Target BP < 130/80  achieved. Educate low salt diet and exercise with weight loss. Educate home self monitoring and diary keeping. Educate risks of elevate blood pressure and benefits of prompt treatment.  Refill Amlodipine            Diabetes (Multi) E11.9       DM - NIDDM  . Reviewed with patient / Will check  HbA1c and fasting blood sugars. Educate home self monitoring and diary keeping(reviewed with patient home blood sugar levels /diary). Educate extensively low calorie diet and weight loss with exercise. Reviewed BS- diary and Rx. Regimen. [place] renal protection with ARB/ACEI.               Educate compliance with diet and Rx. And educate risks and autcomes.      Refill the Metformin 1000 mg BID with meals  and Ozempic (reduce the dose to 0.5 mg q week) -                                              Relevant Medications     semaglutide (Ozempic) 0.25 mg or 0.5 mg (2 mg/3 mL) pen injector (Start on 9/8/2024)     Other Relevant Orders      Hemoglobin A1C     Lumbosacral spondylosis M47.817       DDD - Degenerative disc disease of the Lumbo-Sacral (LS)/spine. Educate exercises and referred patient to Physical Therapy (PT). Ordered X-Ray's of the LS/ spine. Consider MRI. Radiculopathy in the distribution of L4-L5-S1/ nerve roots, needs NSAIDS (Naprosin 500mg BID vs. Arthrotec 75mg BID or Prednisone taper (Medrol dose pack), Flexeril 10 mg qHS, heat application, and pain control with Tylenol 325 mg TID.              Primary osteoarthritis of left knee M17.12       Pain control and Status Post TKR and therapy now the surgical scar is healing well            Acquired hypothyroidism E03.9       Hypothyroidism - Symptoms well/not controlled (weight gain, fatigue, constipation, cold intolerance). Check TSH continue/change dose of Synthroid/Levothyroxine  of  mcg/qD.            Nonalcoholic steatohepatitis (CELIS) K75.81       Educate extensively low carbohydrate diet AND LOW FAT DIET AND increase in exercise activity  and weight loss program and monitor HbA1C and glucose levels and consider Metformin 500 mg BID with meals              Coronary artery disease involving native coronary artery of native heart with angina pectoris (CMS-HCC) I25.119       CAD-coronary artery disease-patient has a history of myocardial infarction/stent placed in stenosed coronary arteries. Target LDL should be below 70 milligrams per deciliter. Reviewed EKG which shows no significant changes. Follows with his cardiologist/ Dr. Davis He needs to call us with any new symptoms of angina or shortness of breath. Last stress test was/last coronary catheterization was/. Need to address risk factors BioCore controlling cholesterol blood pressure and diabetes. Educate extensively diet. Patient needs to follow in rehabilitation/mild exercises daily.

## 2024-11-11 NOTE — ASSESSMENT & PLAN NOTE
DM - NIDDM  . Reviewed with patient / Will check  HbA1c and fasting blood sugars. Educate home self monitoring and diary keeping(reviewed with patient home blood sugar levels /diary). Educate extensively low calorie diet and weight loss with exercise. Reviewed BS- diary and Rx. Regimen. [place] renal protection with ARB/ACEI.               Educate compliance with diet and Rx. And educate risks and autcomes.       Refill the Metformin 500 mg BID with meals

## 2024-12-01 DIAGNOSIS — E78.5 HYPERLIPIDEMIA, UNSPECIFIED HYPERLIPIDEMIA TYPE: ICD-10-CM

## 2024-12-02 DIAGNOSIS — M47.816 SPONDYLOSIS WITHOUT MYELOPATHY OR RADICULOPATHY, LUMBAR REGION: ICD-10-CM

## 2024-12-02 PROCEDURE — RXMED WILLOW AMBULATORY MEDICATION CHARGE

## 2024-12-02 RX ORDER — ROSUVASTATIN CALCIUM 40 MG/1
40 TABLET, COATED ORAL DAILY
Qty: 90 TABLET | Refills: 0 | Status: SHIPPED | OUTPATIENT
Start: 2024-12-02 | End: 2024-12-05 | Stop reason: SDUPTHER

## 2024-12-02 RX ORDER — CYCLOBENZAPRINE HCL 10 MG
10 TABLET ORAL 3 TIMES DAILY PRN
Qty: 90 TABLET | Refills: 0 | Status: SHIPPED | OUTPATIENT
Start: 2024-12-02

## 2024-12-03 ENCOUNTER — PHARMACY VISIT (OUTPATIENT)
Dept: PHARMACY | Facility: CLINIC | Age: 64
End: 2024-12-03
Payer: MEDICARE

## 2024-12-05 DIAGNOSIS — E78.5 HYPERLIPIDEMIA, UNSPECIFIED HYPERLIPIDEMIA TYPE: ICD-10-CM

## 2024-12-05 RX ORDER — ROSUVASTATIN CALCIUM 40 MG/1
40 TABLET, COATED ORAL DAILY
Qty: 90 TABLET | Refills: 0 | Status: SHIPPED | OUTPATIENT
Start: 2024-12-05

## 2024-12-13 DIAGNOSIS — K21.9 GASTROESOPHAGEAL REFLUX DISEASE WITHOUT ESOPHAGITIS: ICD-10-CM

## 2024-12-13 RX ORDER — OMEPRAZOLE 40 MG/1
40 CAPSULE, DELAYED RELEASE ORAL
Qty: 90 CAPSULE | Refills: 0 | Status: SHIPPED | OUTPATIENT
Start: 2024-12-13

## 2024-12-17 DIAGNOSIS — E11.9 TYPE 2 DIABETES MELLITUS WITHOUT COMPLICATION, WITHOUT LONG-TERM CURRENT USE OF INSULIN (MULTI): ICD-10-CM

## 2024-12-17 DIAGNOSIS — K21.9 GASTROESOPHAGEAL REFLUX DISEASE WITHOUT ESOPHAGITIS: ICD-10-CM

## 2024-12-17 DIAGNOSIS — E78.49 OTHER HYPERLIPIDEMIA: ICD-10-CM

## 2024-12-17 RX ORDER — FENOFIBRATE 145 MG/1
145 TABLET, FILM COATED ORAL DAILY
Qty: 90 TABLET | Refills: 0 | Status: SHIPPED | OUTPATIENT
Start: 2024-12-17

## 2024-12-17 RX ORDER — OMEPRAZOLE 40 MG/1
40 CAPSULE, DELAYED RELEASE ORAL
Qty: 90 CAPSULE | Refills: 0 | Status: SHIPPED | OUTPATIENT
Start: 2024-12-17

## 2024-12-17 RX ORDER — SEMAGLUTIDE 0.68 MG/ML
0.5 INJECTION, SOLUTION SUBCUTANEOUS
Qty: 3 ML | Refills: 2 | Status: SHIPPED | OUTPATIENT
Start: 2024-12-17

## 2024-12-21 DIAGNOSIS — I10 BENIGN ESSENTIAL HYPERTENSION: ICD-10-CM

## 2024-12-23 RX ORDER — AMLODIPINE BESYLATE 10 MG/1
10 TABLET ORAL DAILY
Qty: 90 TABLET | Refills: 0 | Status: SHIPPED | OUTPATIENT
Start: 2024-12-23

## 2024-12-26 DIAGNOSIS — Z00.00 ENCOUNTER FOR GENERAL ADULT MEDICAL EXAMINATION WITHOUT ABNORMAL FINDINGS: ICD-10-CM

## 2024-12-27 RX ORDER — ALLOPURINOL 300 MG/1
300 TABLET ORAL DAILY
Qty: 90 TABLET | Refills: 0 | Status: SHIPPED | OUTPATIENT
Start: 2024-12-27

## 2024-12-30 PROCEDURE — RXMED WILLOW AMBULATORY MEDICATION CHARGE

## 2025-01-02 ENCOUNTER — PHARMACY VISIT (OUTPATIENT)
Dept: PHARMACY | Facility: CLINIC | Age: 65
End: 2025-01-02
Payer: MEDICARE

## 2025-01-29 PROCEDURE — RXMED WILLOW AMBULATORY MEDICATION CHARGE

## 2025-01-30 ENCOUNTER — PHARMACY VISIT (OUTPATIENT)
Dept: PHARMACY | Facility: CLINIC | Age: 65
End: 2025-01-30
Payer: COMMERCIAL

## 2025-01-31 DIAGNOSIS — E78.5 HYPERLIPIDEMIA, UNSPECIFIED HYPERLIPIDEMIA TYPE: ICD-10-CM

## 2025-01-31 RX ORDER — ICOSAPENT ETHYL 1 G/1
2 CAPSULE ORAL
Qty: 120 CAPSULE | Refills: 0 | Status: SHIPPED | OUTPATIENT
Start: 2025-01-31

## 2025-02-04 DIAGNOSIS — I10 BENIGN ESSENTIAL HYPERTENSION: ICD-10-CM

## 2025-02-04 DIAGNOSIS — E11.9 TYPE 2 DIABETES MELLITUS WITHOUT COMPLICATION, WITHOUT LONG-TERM CURRENT USE OF INSULIN (MULTI): ICD-10-CM

## 2025-02-04 RX ORDER — METFORMIN HYDROCHLORIDE 500 MG/1
1000 TABLET ORAL
Qty: 360 TABLET | Refills: 0 | Status: SHIPPED | OUTPATIENT
Start: 2025-02-04

## 2025-02-05 DIAGNOSIS — R53.83 FATIGUE, UNSPECIFIED TYPE: ICD-10-CM

## 2025-02-05 RX ORDER — CARVEDILOL 12.5 MG/1
12.5 TABLET ORAL
Qty: 180 TABLET | Refills: 3 | Status: SHIPPED | OUTPATIENT
Start: 2025-02-05 | End: 2026-02-05

## 2025-02-05 RX ORDER — FOLIC ACID 1 MG/1
1 TABLET ORAL DAILY
Qty: 90 TABLET | Refills: 0 | OUTPATIENT
Start: 2025-02-05

## 2025-02-13 DIAGNOSIS — R53.83 FATIGUE, UNSPECIFIED TYPE: ICD-10-CM

## 2025-02-13 RX ORDER — FOLIC ACID 1 MG/1
1 TABLET ORAL DAILY
Qty: 30 TABLET | Refills: 0 | Status: SHIPPED | OUTPATIENT
Start: 2025-02-13

## 2025-02-26 PROCEDURE — RXMED WILLOW AMBULATORY MEDICATION CHARGE

## 2025-02-27 ENCOUNTER — PHARMACY VISIT (OUTPATIENT)
Dept: PHARMACY | Facility: CLINIC | Age: 65
End: 2025-02-27
Payer: COMMERCIAL

## 2025-02-27 DIAGNOSIS — E78.5 HYPERLIPIDEMIA, UNSPECIFIED HYPERLIPIDEMIA TYPE: ICD-10-CM

## 2025-02-27 RX ORDER — ICOSAPENT ETHYL 1 G/1
2 CAPSULE ORAL
Qty: 120 CAPSULE | Refills: 0 | Status: SHIPPED | OUTPATIENT
Start: 2025-02-27

## 2025-03-12 ENCOUNTER — APPOINTMENT (OUTPATIENT)
Dept: PRIMARY CARE | Facility: CLINIC | Age: 65
End: 2025-03-12
Payer: COMMERCIAL

## 2025-03-12 VITALS
RESPIRATION RATE: 21 BRPM | HEART RATE: 62 BPM | WEIGHT: 264 LBS | HEIGHT: 75 IN | OXYGEN SATURATION: 96 % | DIASTOLIC BLOOD PRESSURE: 75 MMHG | BODY MASS INDEX: 32.83 KG/M2 | SYSTOLIC BLOOD PRESSURE: 128 MMHG

## 2025-03-12 DIAGNOSIS — J43.1 PANLOBULAR EMPHYSEMA (MULTI): ICD-10-CM

## 2025-03-12 DIAGNOSIS — K21.9 GASTROESOPHAGEAL REFLUX DISEASE WITHOUT ESOPHAGITIS: ICD-10-CM

## 2025-03-12 DIAGNOSIS — I25.119 CORONARY ARTERY DISEASE INVOLVING NATIVE CORONARY ARTERY OF NATIVE HEART WITH ANGINA PECTORIS: ICD-10-CM

## 2025-03-12 DIAGNOSIS — E78.49 OTHER HYPERLIPIDEMIA: ICD-10-CM

## 2025-03-12 DIAGNOSIS — R53.83 MALAISE AND FATIGUE: ICD-10-CM

## 2025-03-12 DIAGNOSIS — R53.81 MALAISE AND FATIGUE: ICD-10-CM

## 2025-03-12 DIAGNOSIS — Z72.0 TOBACCO USE: ICD-10-CM

## 2025-03-12 DIAGNOSIS — K75.81 NONALCOHOLIC STEATOHEPATITIS (NASH): ICD-10-CM

## 2025-03-12 DIAGNOSIS — E11.9 TYPE 2 DIABETES MELLITUS WITHOUT COMPLICATION, WITHOUT LONG-TERM CURRENT USE OF INSULIN (MULTI): ICD-10-CM

## 2025-03-12 DIAGNOSIS — I10 BENIGN ESSENTIAL HYPERTENSION: Primary | ICD-10-CM

## 2025-03-12 PROCEDURE — 3074F SYST BP LT 130 MM HG: CPT | Performed by: INTERNAL MEDICINE

## 2025-03-12 PROCEDURE — 3008F BODY MASS INDEX DOCD: CPT | Performed by: INTERNAL MEDICINE

## 2025-03-12 PROCEDURE — 3078F DIAST BP <80 MM HG: CPT | Performed by: INTERNAL MEDICINE

## 2025-03-12 PROCEDURE — 1159F MED LIST DOCD IN RCRD: CPT | Performed by: INTERNAL MEDICINE

## 2025-03-12 PROCEDURE — G2211 COMPLEX E/M VISIT ADD ON: HCPCS | Performed by: INTERNAL MEDICINE

## 2025-03-12 PROCEDURE — 4004F PT TOBACCO SCREEN RCVD TLK: CPT | Performed by: INTERNAL MEDICINE

## 2025-03-12 PROCEDURE — 99214 OFFICE O/P EST MOD 30 MIN: CPT | Performed by: INTERNAL MEDICINE

## 2025-03-12 ASSESSMENT — ENCOUNTER SYMPTOMS
NEUROLOGICAL NEGATIVE: 1
RESPIRATORY NEGATIVE: 1
ALLERGIC/IMMUNOLOGIC NEGATIVE: 1
HEMATOLOGIC/LYMPHATIC NEGATIVE: 1
ENDOCRINE NEGATIVE: 1
EYES NEGATIVE: 1
CARDIOVASCULAR NEGATIVE: 1
CONSTITUTIONAL NEGATIVE: 1
PSYCHIATRIC NEGATIVE: 1
MUSCULOSKELETAL NEGATIVE: 1
GASTROINTESTINAL NEGATIVE: 1

## 2025-03-12 NOTE — ASSESSMENT & PLAN NOTE
DM - NIDDM  . Reviewed with patient / Will check  HbA1c and fasting blood sugars. Educate home self monitoring and diary keeping(reviewed with patient home blood sugar levels /diary). Educate extensively low calorie diet and weight loss with exercise. Reviewed BS- diary and Rx. Regimen. [place] renal protection with ARB/ACEI.               Educate compliance with diet and Rx. And educate risks and autcomes.       Refill the Metformin 500 mg BID with meals     and Ozempic

## 2025-03-12 NOTE — PROGRESS NOTES
"Subjective   Patient ID: Hammad Blackburn is a 64 y.o. male who presents for Follow-up (Follow up for refills ).    HPI     Review of Systems   Constitutional: Negative.    HENT: Negative.     Eyes: Negative.    Respiratory: Negative.     Cardiovascular: Negative.    Gastrointestinal: Negative.    Endocrine: Negative.    Musculoskeletal: Negative.    Skin: Negative.    Allergic/Immunologic: Negative.    Neurological: Negative.    Hematological: Negative.    Psychiatric/Behavioral: Negative.     All other systems reviewed and are negative.      Objective   /75   Pulse 62   Resp 21   Ht 1.905 m (6' 3\")   Wt 120 kg (264 lb)   SpO2 96%   BMI 33.00 kg/m²     Physical Exam  Vitals and nursing note reviewed.   Constitutional:       Appearance: Normal appearance.   HENT:      Head: Normocephalic and atraumatic.      Right Ear: Tympanic membrane, ear canal and external ear normal.      Left Ear: Tympanic membrane, ear canal and external ear normal. There is no impacted cerumen.      Nose: Nose normal.      Mouth/Throat:      Mouth: Mucous membranes are moist.      Pharynx: Oropharynx is clear.   Eyes:      Extraocular Movements: Extraocular movements intact.      Conjunctiva/sclera: Conjunctivae normal.      Pupils: Pupils are equal, round, and reactive to light.   Cardiovascular:      Rate and Rhythm: Normal rate and regular rhythm.      Pulses: Normal pulses.      Heart sounds: Normal heart sounds. No murmur heard.  Pulmonary:      Effort: Pulmonary effort is normal. No respiratory distress.      Breath sounds: Normal breath sounds. No stridor. No wheezing, rhonchi or rales.   Chest:      Chest wall: No tenderness.   Abdominal:      General: Abdomen is flat. Bowel sounds are normal. There is no distension.      Palpations: Abdomen is soft. There is no mass.      Tenderness: There is no abdominal tenderness. There is no right CVA tenderness, left CVA tenderness, guarding or rebound.      Hernia: No hernia is " present.   Musculoskeletal:         General: Normal range of motion.      Cervical back: Normal range of motion and neck supple.   Skin:     General: Skin is warm.      Capillary Refill: Capillary refill takes less than 2 seconds.   Neurological:      General: No focal deficit present.      Mental Status: He is alert.      Cranial Nerves: No cranial nerve deficit.      Sensory: No sensory deficit.      Motor: No weakness.      Coordination: Coordination normal.      Gait: Gait normal.      Deep Tendon Reflexes: Reflexes normal.   Psychiatric:         Mood and Affect: Mood normal.         Behavior: Behavior normal. Behavior is cooperative.         Thought Content: Thought content normal.         Judgment: Judgment normal.         Assessment/Plan   Problem List Items Addressed This Visit             ICD-10-CM    Benign essential hypertension - Primary I10       HTN - hypertension well/controlled .Target BP < 130/80 achieved. Educate low salt diet and exercise with weight loss. Educate home self monitoring and diary keeping. Educate risks of elevate blood pressure and benefits of prompt treatment. Refill Amlodipine          Esophageal reflux K21.9     GERD - Acid reflux disease. Rx. PPI (Prilosec/Prevacid/Protonix/Nexium) and educate diet and life style changes. Referred patient to an endoscopy (EGD) and check H. Pylori titers.          Hyperlipidemia E78.5     Hypercholesterolemia - Monitor lipid profile and educate patient upon risks of high cholesterol and targets. Educate diet and change in lifestyle and increase in exercises - Refill: Fenofibrate and  Rosuvastatin and Vascepa  and educate compliance with medication and diet.              Relevant Orders    Lipid Panel    Malaise and fatigue R53.81, R53.83     Fatigue - check CMP(metabolic panel and elctrolytes) , CBC(complete blood cell count), TSH(thyroid function). Insomnia may play a role and sleep studies(rule out sleep apnea) are recommended . Educate sleep  hygiene. Consider anxiety disorder vs. depression. Consider Stress test, and 2DECHO.           Relevant Orders    CBC and Auto Differential    Comprehensive Metabolic Panel    TSH with reflex to Free T4 if abnormal    Type 2 diabetes mellitus without complications (Multi) E11.9     DM - NIDDM  . Reviewed with patient / Will check  HbA1c and fasting blood sugars. Educate home self monitoring and diary keeping(reviewed with patient home blood sugar levels /diary). Educate extensively low calorie diet and weight loss with exercise. Reviewed BS- diary and Rx. Regimen. [place] renal protection with ARB/ACEI.               Educate compliance with diet and Rx. And educate risks and autcomes.       Refill the Metformin 500 mg BID with meals     and Ozempic                                  Relevant Orders    Hemoglobin A1C    Nonalcoholic steatohepatitis (CELIS) K75.81     Educate extensively low carbohydrate diet AND LOW FAT DIET AND increase in exercise activity  and weight loss program and monitor HbA1C and glucose levels and consider Metformin 500 mg BID with meals            Tobacco use Z72.0     Tobacco cessation - Educate risks of smoking (CAD/COPD/CANCER of the lung or urinary bladder/CVA). Educate life style changes and prescribe nicotine patch and Wellbutrin 150mg BID. Educate stress reduction.                                                                                      Coronary artery disease involving native coronary artery of native heart with angina pectoris I25.119     CAD-coronary artery disease-patient has a history of myocardial infarction/stent placed in stenosed coronary arteries. Target LDL should be below 70 milligrams per deciliter. Reviewed EKG which shows no significant changes. Follows with his cardiologist/ Dr. Davis He needs to call us with any new symptoms of angina or shortness of breath. Last stress test was/last coronary catheterization was/. Need to address risk factors [employer]  controlling cholesterol blood pressure and diabetes. Educate extensively diet. Patient needs to follow in rehabilitation/mild exercises daily.           Other Visit Diagnoses         Codes    Panlobular emphysema (Multi)     J43.1            Benign essential hypertension I10        HTN - hypertension well/controlled .Target BP < 130/80 achieved. Educate low salt diet and exercise with weight loss. Educate home self monitoring and diary keeping. Educate risks of elevate blood pressure and benefits of prompt treatment. Refill Amlodipine            Esophageal reflux K21.9       GERD - Acid reflux disease. Rx. PPI (Prilosec/Prevacid/Protonix/Nexium) and educate diet and life style changes. Referred patient to an endoscopy (EGD) and check H. Pylori titers.            Malaise and fatigue R53.81, R53.83       Fatigue - check CMP(metabolic panel and elctrolytes) , CBC(complete blood cell count), TSH(thyroid function). Insomnia may play a role and sleep studies(rule out sleep apnea) are recommended . Educate sleep hygiene. Consider anxiety disorder vs. depression. Consider Stress test, and 2DECHO.               Lumbosacral spondylosis M47.817       DDD - Degenerative disc disease of the Lumbo-Sacral (LS)/spine. Educate exercises and referred patient to Physical Therapy (PT). Ordered X-Ray's of the LS/ spine. Consider MRI. Radiculopathy in the distribution of L4-L5-S1/ nerve roots, needs NSAIDS (Naprosin 500mg BID vs. Arthrotec 75mg BID or Prednisone taper (Medrol dose pack), Flexeril 10 mg qHS, heat application, and pain control with Tylenol 325 mg TID.              Relevant Medications     cyclobenzaprine (Flexeril) 10 mg tablet     predniSONE (Deltasone) 10 mg tablet     gabapentin (Neurontin) 100 mg capsule     Lumbar radiculopathy, chronic - Primary M54.16       DDD - Degenerative disc disease of the Lumbo-Sacral (LS)/spine. Educate exercises and referred patient to Physical Therapy (PT). Ordered X-Ray's of the LS/  spine. Consider MRI. Radiculopathy in the distribution of L4-L5-S1/ nerve roots, needs NSAIDS (Naprosin 500mg BID vs. Arthrotec 75mg BID or Prednisone taper (Medrol dose pack), Flexeril 10 mg qHS, heat application, and pain control with Tylenol 325 mg TID.             Relevant Medications     cyclobenzaprine (Flexeril) 10 mg tablet     predniSONE (Deltasone) 10 mg tablet     gabapentin (Neurontin) 100 mg capsule     Type 2 diabetes mellitus without complications (Multi) E11.9       DM - NIDDM  . Reviewed with patient / Will check  HbA1c and fasting blood sugars. Educate home self monitoring and diary keeping(reviewed with patient home blood sugar levels /diary). Educate extensively low calorie diet and weight loss with exercise. Reviewed BS- diary and Rx. Regimen. [place] renal protection with ARB/ACEI.               Educate compliance with diet and Rx. And educate risks and autcomes.       Refill the Metformin 500 mg BID with meals                                      Nonalcoholic steatohepatitis (CELIS) K75.81       Educate extensively low carbohydrate diet AND LOW FAT DIET AND increase in exercise activity and weight loss program and monitor HbA1C and glucose levels and consider Metformin 500 mg BID with meals            Coronary artery disease involving native coronary artery of native heart with angina pectoris I25.119       CAD-coronary artery disease-patient has a history of myocardial infarction/stent placed in stenosed coronary arteries. Target LDL should be below 70 milligrams per deciliter. Reviewed EKG which shows no significant changes. Follows with his cardiologist/ Dr. Davis He needs to call us with any new symptoms of angina or shortness of breath. Last stress test was/last coronary catheterization was/. Need to address risk factors [employer] controlling cholesterol blood pressure and diabetes. Educate extensively diet. Patient needs to follow in rehabilitation/mild exercises daily.                       Benign essential hypertension I10         HTN - hypertension well/controlled .Target BP < 130/80  achieved. Educate low salt diet and exercise with weight loss. Educate home self monitoring and diary keeping. Educate risks of elevate blood pressure and benefits of prompt treatment.  Refill Amlodipine            Diabetes (Multi) E11.9       DM - NIDDM  . Reviewed with patient / Will check  HbA1c and fasting blood sugars. Educate home self monitoring and diary keeping(reviewed with patient home blood sugar levels /diary). Educate extensively low calorie diet and weight loss with exercise. Reviewed BS- diary and Rx. Regimen. [place] renal protection with ARB/ACEI.               Educate compliance with diet and Rx. And educate risks and autcomes.      Refill the Metformin 1000 mg BID with meals  and Ozempic (reduce the dose to 0.5 mg q week) -                                              Relevant Medications     semaglutide (Ozempic) 0.25 mg or 0.5 mg (2 mg/3 mL) pen injector (Start on 9/8/2024)     Other Relevant Orders     Hemoglobin A1C     Lumbosacral spondylosis M47.817       DDD - Degenerative disc disease of the Lumbo-Sacral (LS)/spine. Educate exercises and referred patient to Physical Therapy (PT). Ordered X-Ray's of the LS/ spine. Consider MRI. Radiculopathy in the distribution of L4-L5-S1/ nerve roots, needs NSAIDS (Naprosin 500mg BID vs. Arthrotec 75mg BID or Prednisone taper (Medrol dose pack), Flexeril 10 mg qHS, heat application, and pain control with Tylenol 325 mg TID.              Primary osteoarthritis of left knee M17.12       Pain control and Status Post TKR and therapy now the surgical scar is healing well            Acquired hypothyroidism E03.9       Hypothyroidism - Symptoms well/not controlled (weight gain, fatigue, constipation, cold intolerance). Check TSH continue/change dose of Synthroid/Levothyroxine  of  mcg/qD.            Nonalcoholic steatohepatitis (CELIS) K75.81       Educate  extensively low carbohydrate diet AND LOW FAT DIET AND increase in exercise activity  and weight loss program and monitor HbA1C and glucose levels and consider Metformin 500 mg BID with meals              Coronary artery disease involving native coronary artery of native heart with angina pectoris (CMS-[facility]) I25.119       CAD-coronary artery disease-patient has a history of myocardial infarction/stent placed in stenosed coronary arteries. Target LDL should be below 70 milligrams per deciliter. Reviewed EKG which shows no significant changes. Follows with his cardiologist/ Dr. Davis He needs to call us with any new symptoms of angina or shortness of breath. Last stress test was/last coronary catheterization was/. Need to address risk factors [employer] controlling cholesterol blood pressure and diabetes. Educate extensively diet. Patient needs to follow in rehabilitation/mild exercises daily.                Immunizations/Injections      very important  Immunizations from outside sources need reconciliation.      COVID-19, mRNA, LNP-S, PF, 30 mcg/0.3 mL dose10/13/2021, 4/8/2021, 3/16/2021  Flu vaccine, trivalent, preservative free, HIGH-DOSE, age 65y+ (Fluzone)10/19/2021  Influenza, Juryeorcupa65/6/2017  Influenza, seasonal, dmhcpaoieh83/3/2018  Pfizer COVID-19 vaccine, 12 years and older, (30mcg/0.3mL) (Comirnaty)10/11/2024, 10/9/2023  RSV-MAb10/23/2023

## 2025-03-16 DIAGNOSIS — E78.49 OTHER HYPERLIPIDEMIA: ICD-10-CM

## 2025-03-16 LAB
ALBUMIN SERPL-MCNC: 4.9 G/DL (ref 3.6–5.1)
ALP SERPL-CCNC: 54 U/L (ref 35–144)
ALT SERPL-CCNC: 11 U/L (ref 9–46)
ANION GAP SERPL CALCULATED.4IONS-SCNC: 10 MMOL/L (CALC) (ref 7–17)
AST SERPL-CCNC: 13 U/L (ref 10–35)
BASOPHILS # BLD AUTO: 29 CELLS/UL (ref 0–200)
BASOPHILS NFR BLD AUTO: 0.3 %
BILIRUB SERPL-MCNC: 0.6 MG/DL (ref 0.2–1.2)
BUN SERPL-MCNC: 20 MG/DL (ref 7–25)
CALCIUM SERPL-MCNC: 10.1 MG/DL (ref 8.6–10.3)
CHLORIDE SERPL-SCNC: 103 MMOL/L (ref 98–110)
CHOLEST SERPL-MCNC: 76 MG/DL
CHOLEST/HDLC SERPL: 1.8 (CALC)
CO2 SERPL-SCNC: 26 MMOL/L (ref 20–32)
CREAT SERPL-MCNC: 1.01 MG/DL (ref 0.7–1.35)
EGFRCR SERPLBLD CKD-EPI 2021: 83 ML/MIN/1.73M2
EOSINOPHIL # BLD AUTO: 127 CELLS/UL (ref 15–500)
EOSINOPHIL NFR BLD AUTO: 1.3 %
ERYTHROCYTE [DISTWIDTH] IN BLOOD BY AUTOMATED COUNT: 13 % (ref 11–15)
EST. AVERAGE GLUCOSE BLD GHB EST-MCNC: 126 MG/DL
EST. AVERAGE GLUCOSE BLD GHB EST-SCNC: 7 MMOL/L
GLUCOSE SERPL-MCNC: 95 MG/DL (ref 65–99)
HBA1C MFR BLD: 6 % OF TOTAL HGB
HCT VFR BLD AUTO: 46.1 % (ref 38.5–50)
HDLC SERPL-MCNC: 42 MG/DL
HGB BLD-MCNC: 15.2 G/DL (ref 13.2–17.1)
LDLC SERPL CALC-MCNC: <10 MG/DL (CALC)
LYMPHOCYTES # BLD AUTO: 1882 CELLS/UL (ref 850–3900)
LYMPHOCYTES NFR BLD AUTO: 19.2 %
MCH RBC QN AUTO: 30.1 PG (ref 27–33)
MCHC RBC AUTO-ENTMCNC: 33 G/DL (ref 32–36)
MCV RBC AUTO: 91.3 FL (ref 80–100)
MONOCYTES # BLD AUTO: 461 CELLS/UL (ref 200–950)
MONOCYTES NFR BLD AUTO: 4.7 %
NEUTROPHILS # BLD AUTO: 7301 CELLS/UL (ref 1500–7800)
NEUTROPHILS NFR BLD AUTO: 74.5 %
NONHDLC SERPL-MCNC: 34 MG/DL (CALC)
PLATELET # BLD AUTO: 223 THOUSAND/UL (ref 140–400)
PMV BLD REES-ECKER: 9.4 FL (ref 7.5–12.5)
POTASSIUM SERPL-SCNC: 4.8 MMOL/L (ref 3.5–5.3)
PROT SERPL-MCNC: 7 G/DL (ref 6.1–8.1)
RBC # BLD AUTO: 5.05 MILLION/UL (ref 4.2–5.8)
SODIUM SERPL-SCNC: 139 MMOL/L (ref 135–146)
TRIGL SERPL-MCNC: 270 MG/DL
TSH SERPL-ACNC: 2.85 MIU/L (ref 0.4–4.5)
WBC # BLD AUTO: 9.8 THOUSAND/UL (ref 3.8–10.8)

## 2025-03-17 RX ORDER — FENOFIBRATE 145 MG/1
145 TABLET, FILM COATED ORAL DAILY
Qty: 90 TABLET | Refills: 0 | Status: SHIPPED | OUTPATIENT
Start: 2025-03-17

## 2025-03-20 DIAGNOSIS — R53.83 FATIGUE, UNSPECIFIED TYPE: ICD-10-CM

## 2025-03-20 RX ORDER — FOLIC ACID 1 MG/1
1 TABLET ORAL DAILY
Qty: 90 TABLET | Refills: 0 | Status: SHIPPED | OUTPATIENT
Start: 2025-03-20

## 2025-03-24 DIAGNOSIS — I25.10 ARTERIOSCLEROSIS OF CORONARY ARTERY: ICD-10-CM

## 2025-03-24 DIAGNOSIS — E78.49 OTHER HYPERLIPIDEMIA: ICD-10-CM

## 2025-03-24 RX ORDER — EVOLOCUMAB 140 MG/ML
140 INJECTION, SOLUTION SUBCUTANEOUS
Qty: 6 EACH | Refills: 4 | Status: CANCELLED | OUTPATIENT
Start: 2025-03-24

## 2025-03-26 DIAGNOSIS — I10 BENIGN ESSENTIAL HYPERTENSION: ICD-10-CM

## 2025-03-26 RX ORDER — AMLODIPINE BESYLATE 10 MG/1
10 TABLET ORAL DAILY
Qty: 90 TABLET | Refills: 0 | Status: SHIPPED | OUTPATIENT
Start: 2025-03-26

## 2025-03-27 DIAGNOSIS — I25.10 ARTERIOSCLEROSIS OF CORONARY ARTERY: ICD-10-CM

## 2025-03-27 DIAGNOSIS — E78.49 OTHER HYPERLIPIDEMIA: ICD-10-CM

## 2025-03-31 PROCEDURE — RXMED WILLOW AMBULATORY MEDICATION CHARGE

## 2025-03-31 RX ORDER — EVOLOCUMAB 140 MG/ML
140 INJECTION, SOLUTION SUBCUTANEOUS
Qty: 6 EACH | Refills: 4 | Status: SHIPPED | OUTPATIENT
Start: 2025-03-31

## 2025-04-01 ENCOUNTER — PHARMACY VISIT (OUTPATIENT)
Dept: PHARMACY | Facility: CLINIC | Age: 65
End: 2025-04-01
Payer: COMMERCIAL

## 2025-04-07 DIAGNOSIS — Z00.00 ENCOUNTER FOR GENERAL ADULT MEDICAL EXAMINATION WITHOUT ABNORMAL FINDINGS: ICD-10-CM

## 2025-04-07 DIAGNOSIS — E78.5 HYPERLIPIDEMIA, UNSPECIFIED HYPERLIPIDEMIA TYPE: ICD-10-CM

## 2025-04-07 RX ORDER — ICOSAPENT ETHYL 1 G/1
2 CAPSULE ORAL
Qty: 120 CAPSULE | Refills: 0 | Status: SHIPPED | OUTPATIENT
Start: 2025-04-07

## 2025-04-07 RX ORDER — ALLOPURINOL 300 MG/1
300 TABLET ORAL DAILY
Qty: 90 TABLET | Refills: 0 | Status: SHIPPED | OUTPATIENT
Start: 2025-04-07

## 2025-04-14 DIAGNOSIS — M54.16 LUMBAR RADICULOPATHY, CHRONIC: ICD-10-CM

## 2025-04-14 DIAGNOSIS — M47.27 OSTEOARTHRITIS OF SPINE WITH RADICULOPATHY, LUMBOSACRAL REGION: ICD-10-CM

## 2025-04-14 RX ORDER — CYCLOBENZAPRINE HCL 10 MG
10 TABLET ORAL NIGHTLY PRN
Qty: 30 TABLET | Refills: 2 | Status: SHIPPED | OUTPATIENT
Start: 2025-04-14 | End: 2025-12-10

## 2025-04-15 ENCOUNTER — OFFICE VISIT (OUTPATIENT)
Dept: CARDIOLOGY | Facility: HOSPITAL | Age: 65
End: 2025-04-15
Payer: MEDICARE

## 2025-04-15 VITALS
HEIGHT: 74 IN | SYSTOLIC BLOOD PRESSURE: 122 MMHG | OXYGEN SATURATION: 96 % | DIASTOLIC BLOOD PRESSURE: 74 MMHG | HEART RATE: 67 BPM | WEIGHT: 261 LBS | BODY MASS INDEX: 33.5 KG/M2

## 2025-04-15 DIAGNOSIS — E11.9 TYPE 2 DIABETES MELLITUS WITHOUT COMPLICATION, WITHOUT LONG-TERM CURRENT USE OF INSULIN: ICD-10-CM

## 2025-04-15 DIAGNOSIS — Z72.0 TOBACCO USE: ICD-10-CM

## 2025-04-15 DIAGNOSIS — I10 BENIGN ESSENTIAL HYPERTENSION: ICD-10-CM

## 2025-04-15 DIAGNOSIS — E78.00 PURE HYPERCHOLESTEROLEMIA: ICD-10-CM

## 2025-04-15 DIAGNOSIS — I25.119 CORONARY ARTERY DISEASE INVOLVING NATIVE CORONARY ARTERY OF NATIVE HEART WITH ANGINA PECTORIS: Primary | ICD-10-CM

## 2025-04-15 LAB
ATRIAL RATE: 67 BPM
P AXIS: 56 DEGREES
P OFFSET: 191 MS
P ONSET: 128 MS
PR INTERVAL: 168 MS
Q ONSET: 212 MS
QRS COUNT: 11 BEATS
QRS DURATION: 86 MS
QT INTERVAL: 406 MS
QTC CALCULATION(BAZETT): 429 MS
QTC FREDERICIA: 421 MS
R AXIS: -3 DEGREES
T AXIS: 52 DEGREES
T OFFSET: 415 MS
VENTRICULAR RATE: 67 BPM

## 2025-04-15 PROCEDURE — 99214 OFFICE O/P EST MOD 30 MIN: CPT | Performed by: INTERNAL MEDICINE

## 2025-04-15 PROCEDURE — 1160F RVW MEDS BY RX/DR IN RCRD: CPT | Performed by: INTERNAL MEDICINE

## 2025-04-15 PROCEDURE — 99406 BEHAV CHNG SMOKING 3-10 MIN: CPT | Performed by: INTERNAL MEDICINE

## 2025-04-15 PROCEDURE — 4004F PT TOBACCO SCREEN RCVD TLK: CPT | Performed by: INTERNAL MEDICINE

## 2025-04-15 PROCEDURE — 93005 ELECTROCARDIOGRAM TRACING: CPT | Performed by: INTERNAL MEDICINE

## 2025-04-15 PROCEDURE — 3008F BODY MASS INDEX DOCD: CPT | Performed by: INTERNAL MEDICINE

## 2025-04-15 PROCEDURE — 3078F DIAST BP <80 MM HG: CPT | Performed by: INTERNAL MEDICINE

## 2025-04-15 PROCEDURE — G2211 COMPLEX E/M VISIT ADD ON: HCPCS | Performed by: INTERNAL MEDICINE

## 2025-04-15 PROCEDURE — 3074F SYST BP LT 130 MM HG: CPT | Performed by: INTERNAL MEDICINE

## 2025-04-15 PROCEDURE — 1159F MED LIST DOCD IN RCRD: CPT | Performed by: INTERNAL MEDICINE

## 2025-04-15 NOTE — PATIENT INSTRUCTIONS
Stop smoking.  Tobacco cessation referral.  Check abdominal aortic ultrasound.  Increase your physical activity and continue to work on losing weight.  Follow-up in 6 months.

## 2025-04-15 NOTE — PROGRESS NOTES
Primary Care Physician: Richard Garcia MD  Date of Visit: 04/15/2025 10:00 AM EDT  Location of visit: [facility]     Chief Complaint:   Chief Complaint   Patient presents with    Follow-up        HPI / Summary:   Hammad Blackburn is a 64 y.o. male who presents for follow-up.  He has no cardiac complaints.  He underwent a left knee replacement in June without complication.  He is able to walk up stairs without chest pain or shortness of breath.  The patient denies chest pain, shortness of breath, palpitations, lightheadedness, syncope, orthopnea, paroxysmal nocturnal dyspnea, lower extremity edema, or bleeding problems.    He did stop smoking for several months but restarted it in November.        Past Medical History:   Diagnosis Date    Abnormal stress test     followed by heart cath    Anxiety disorder, unspecified     Arthritis     Cardiology follow-up encounter     Scheduled with JACOBO Lay NP for 4/11/2024    COPD (chronic obstructive pulmonary disease) (Multi)     Coronary atherosclerosis     Diabetes mellitus (Multi)     Elevated prostate specific antigen (PSA)     GERD (gastroesophageal reflux disease)     H/O percutaneous left heart catheterization 03/11/2024    1. Severe branch vessel CAD (D1 and OM1) in a right dominant system.  2.  of small inferior branch of OM1.  3. Elevated LVEDP.  4. No evidence of aortic stenosis.    History of depression     Hyperlipidemia     Hypertension     Nephrolithiasis     Sleep apnea     Tobacco use     Unilateral primary osteoarthritis, left knee         Past Surgical History:   Procedure Laterality Date    ADENOIDECTOMY      APPENDECTOMY      CARDIAC CATHETERIZATION N/A 03/11/2024    Procedure: Left Heart Cath with Coronary Angiography and LV;  Surgeon: Alan Davis MD;  Location: [facility] [place] Cardiac Cath Lab;  Service: Cardiovascular;  Laterality: N/A;  Scheduled 3/11/24 @ 10:30 am    COLONOSCOPY      HERNIA REPAIR      KNEE SURGERY      Knee Arthroscopy With  Medial And Lateral Meniscus Repair    TONSILLECTOMY            Social History:   reports that he has been smoking cigarettes. He started smoking about 44 years ago. He has a 22.1 pack-year smoking history. He has never used smokeless tobacco. He reports current alcohol use. He reports that he does not use drugs.     Family History:  family history includes COPD in his mother; Coronary artery disease (age of onset: 40 - 49) in his father; Diabetes in his father; Hypertension in his father.      Allergies:  No Known Allergies    Outpatient Medications:  Current Outpatient Medications   Medication Instructions    allopurinol (ZYLOPRIM) 300 mg, oral, Daily    amLODIPine (NORVASC) 10 mg, oral, Daily    aspirin 81 mg, Daily    budesonide-formoteroL (Symbicort) 160-4.5 mcg/actuation inhaler 2 puffs, 2 times daily PRN    carvedilol (COREG) 12.5 mg, oral, 2 times daily (morning and late afternoon)    co-enzyme Q-10 30 mg capsule 1 capsule, Daily    cyclobenzaprine (FLEXERIL) 10 mg, oral, Nightly PRN    fenofibrate (TRICOR) 145 mg, oral, Daily, Take with food.    fluticasone (Flonase) 50 mcg/actuation nasal spray 1 spray, Each Nostril, 2 times daily PRN    folic acid (FOLVITE) 1 mg, oral, Daily    gabapentin (NEURONTIN) 100 mg, oral, 3 times daily    icosapent ethyL (VASCEPA) 2 g, oral, 2 times daily (morning and late afternoon)    metFORMIN (GLUCOPHAGE) 1,000 mg, oral, 2 times daily (morning and late afternoon)    omeprazole (PRILOSEC) 40 mg, oral, Daily before breakfast, Do not crush or chew.    ondansetron (ZOFRAN) 4 mg, oral, Every 8 hours PRN    OneTouch Delica Plus Lancet 30 gauge misc 1 Lancet, Topical, Daily    OneTouch Ultra Test strip 1 strip, miscellaneous, 2 times daily    Ozempic 0.5 mg, subcutaneous, Once Weekly    pantoprazole (ProtoNix) 40 mg EC tablet TAKE 1 TABLET (40 MG) BY MOUTH ONCE DAILY IN THE MORNING BEFORE A MEAL. DO NOT CRUSH, CHEW, OR SPLIT    Repatha SureClick 140 mg, subcutaneous, Every 14 days     "rosuvastatin (CRESTOR) 40 mg, oral, Daily       Physical Exam:  Vitals:    04/15/25 1014   BP: 122/74   BP Location: Left arm   Patient Position: Sitting   Pulse: 67   SpO2: 96%   Weight: 118 kg (261 lb)   Height: 1.88 m (6' 2\")     Wt Readings from Last 5 Encounters:   04/15/25 118 kg (261 lb)   03/12/25 120 kg (264 lb)   11/11/24 118 kg (260 lb)   10/15/24 118 kg (261 lb 3.2 oz)   09/05/24 117 kg (258 lb)     Body mass index is 33.51 kg/m².   General: Well-developed well-nourished in no acute distress  HEENT: Normocephalic atraumatic  Neck: Supple, JVP is normal negative hepatojugular reflux 2+ carotid pulses without bruit  Pulmonary: Normal respiratory effort, clear to auscultation  Cardiovascular: No right ventricular heave, normal S1 and S2, 1 out of 6 early peaking systolic ejection murmur no rubs or gallops  Abdomen: Soft nontender nondistended  Extremities: Warm without edema 2+ radial pulses bilaterally 2+ posterior tibial pulses bilaterally  Neurologic: Alert and oriented x3  Psychiatric: Normal mood and affect     Last Labs:  CMP:  Recent Labs     03/14/25  1043 09/06/24  0845 06/18/24  0547    141 138   K 4.8 4.2 4.3    104 102   CO2 26 28 27   ANIONGAP 10 13 13   BUN 20 15 22   CREATININE 1.01 0.93 0.99   EGFR 83 >90 85   GLUCOSE 95 99 156*     Recent Labs     03/14/25  1043 09/06/24  0845 06/10/24  1029   ALBUMIN 4.9 4.6 4.5   ALKPHOS 54 58 61   ALT 11 13 15   AST 13 14 14   BILITOT 0.6 0.6 0.6     CBC:  Recent Labs     03/14/25  1043 09/06/24  0845 06/18/24  0547   WBC 9.8 8.1 13.2*   HGB 15.2 13.2* 11.9*   HCT 46.1 42.0 36.9*    210 198   MCV 91.3 89 90     COAG:   Recent Labs     01/31/19  0631 01/30/19  1055   INR 1.0 1.0     HEME/ENDO:  Recent Labs     03/14/25  1043 09/06/24  0845 06/10/24  1029 04/11/24  1316 03/08/24  1013   TSH 2.85 3.19  --   --  3.18   HGBA1C 6.0* 5.7* 7.2*   < > 7.5*    < > = values in this interval not displayed.      CARDIAC:   Recent Labs     " 01/30/19  1055   BNP 9     Recent Labs     03/14/25  1043 09/06/24  0845 06/10/24  1029 03/08/24  1013 03/24/23  1041 08/15/22  1033 05/27/22  0930   CHOL 76 86 101   < > 186 182 178   LDLF  --   --   --   --  92 81 81   LDLCALC <10 14 6   < >  --   --   --    HDL 42 42.6 37.9   < > 45.4 42.4 36.9*   TRIG 270* 149 287*   < > 241* 291* 303*    < > = values in this interval not displayed.       Last Cardiology Tests:  ECG:  An electrocardiogram performed today that I reviewed shows normal sinus rhythm with a PAC and PVC.    Echo:  Echocardiogram February 15, 2024.  Normal LV function with an ejection fraction of 65 to 70%.  Moderate concentric left ventricular hypertrophy septum 1.3 cm and a posterior wall measurement of 1.5 cm  Sclerotic aortic valve without significant stenosis  Mild mitral regurgitation  Mild to moderate tricuspid regurgitation    Echocardiogram January 31, 2019  CONCLUSIONS:   1. The left ventricular systolic function is normal with a 60-65% estimated ejection fraction.   2. Moderately increased left ventricular septal thickness.   3. Spectral Doppler shows an impaired relaxation pattern of left ventricular diastolic filling.   4. RVSP within normal limits.   5. Aortic valve stenosis is not present.   6. The pulmonary artery is not well visualized.          Cath:  Cardiac catheterization March 11, 2024  Coronary Lesion Summary:  Vessel            Stenosis      Vessel Segment  LAD           40-50% stenosis         mid  1st Diagonal    40% stenosis    proximal to mid  1st Diagonal    80% stenosis          mid  OM 1            70% stenosis       proximal  Ramus        10 to 30% stenosis    proximal  RCA             30% stenosis        distal  CONCLUSIONS:   1. Severe branch vessel CAD (D1 and OM1) in a right dominant system.   2.  of small inferior branch of OM1.   3. Elevated LVEDP.   4. No evidence of aortic stenosis.    Stress Test:  Lexiscan nuclear stress test February 14, 2024  Small area of  ischemia in the apical lateral wall with basal lateral scar.  Normal LV function with an ejection fraction of 60%.  No angina or arrhythmias or ischemic EKG changes with pharmacological stress.  Mild resting hypertension.         Cardiac Imaging:  CT lung screening 5/18/2023  HEART AND VESSELS:   The thoracic aorta normal in course and caliber.There is mild   scattered calcified atherosclerosis present.   Main pulmonary artery and its branches are normal in caliber.   Severe coronary artery calcifications are seen. Please note,the study   is not optimized for evaluation of coronary arteries.   The cardiac chambers are not enlarged.   There is no pericardial effusion seen.   Impression   1. 2 mm right lower lobe nodule, likely benign. Continued screening  with low-dose noncontrast chest CT in 12 months (from current date)  is recommended.  2. Mild upper lung predominant emphysema.  3. Severe coronary artery calcification. Correlation with coronary  artery disease risk factors.  4. Diffuse hepatic steatosis. PCP evaluation is recommended.       Assessment/Plan   Diagnoses and all orders for this visit:  Coronary artery disease involving native coronary artery of native heart with angina pectoris  -     ECG 12 lead (Clinic Performed)  Pure hypercholesterolemia  Benign essential hypertension  Type 2 diabetes mellitus without complication, without long-term current use of insulin  Tobacco use  -     Vascular US abdominal aorta anuerysm AAA screening; Future  -     Referral to Tobacco Cessation Counseling; Future    In summary Mr. Blackburn is a pleasant 64-year-old white male with a past medical history significant for severe coronary atherosclerosis on CT with severe branch vessel disease on angiography with preserved LV function, type II non-insulin-requiring diabetes, hypertension, dyslipidemia, obstructive sleep apnea, obesity, COPD, and chronic tobacco use.  He is asymptomatic from a cardiac perspective.  He is euvolemic  on exam.  His blood pressure is controlled.  His lipids are at LDL and non-HDL goals.  His triglycerides remain elevated.  I encouraged him to increase his physical activity and continue to work on losing weight.  I strongly encouraged him to stop smoking and spent more than 3 minutes of time counseling to do so.  I referred him to tobacco cessation.  I ordered abdominal aortic ultrasound to screen for an aneurysm.  He should continue his current cardiovascular medications.  We will see him back in follow-up in 6 months.      Orders:  Orders Placed This Encounter   Procedures    Referral to Tobacco Cessation Counseling    ECG 12 lead (Clinic Performed)      Followup Appts:  Future Appointments   Date Time Provider Department Center   6/10/2025  1:15 PM Jung Plunkett MD [id number] [facility]   6/11/2025  1:15 PM Richard Garcia MD [id number] [facility]   10/16/2025 11:00 AM Norene Lay, APRN-CNP [id number] [facility]           ____________________________________________________________  Alan Davis MD  [facility]  [facility]

## 2025-04-17 DIAGNOSIS — E11.9 TYPE 2 DIABETES MELLITUS WITHOUT COMPLICATION, WITHOUT LONG-TERM CURRENT USE OF INSULIN: ICD-10-CM

## 2025-04-17 RX ORDER — SEMAGLUTIDE 0.68 MG/ML
0.5 INJECTION, SOLUTION SUBCUTANEOUS
Qty: 3 ML | Refills: 0 | Status: SHIPPED | OUTPATIENT
Start: 2025-04-20

## 2025-04-24 ENCOUNTER — APPOINTMENT (OUTPATIENT)
Dept: VASCULAR MEDICINE | Facility: HOSPITAL | Age: 65
End: 2025-04-24
Payer: MEDICARE

## 2025-05-01 PROCEDURE — RXMED WILLOW AMBULATORY MEDICATION CHARGE

## 2025-05-02 ENCOUNTER — PHARMACY VISIT (OUTPATIENT)
Dept: PHARMACY | Facility: CLINIC | Age: 65
End: 2025-05-02
Payer: COMMERCIAL

## 2025-05-02 DIAGNOSIS — E78.5 HYPERLIPIDEMIA, UNSPECIFIED HYPERLIPIDEMIA TYPE: ICD-10-CM

## 2025-05-02 RX ORDER — ICOSAPENT ETHYL 1 G/1
2 CAPSULE ORAL
Qty: 360 CAPSULE | Refills: 1 | Status: SHIPPED | OUTPATIENT
Start: 2025-05-02

## 2025-05-07 ENCOUNTER — APPOINTMENT (OUTPATIENT)
Dept: VASCULAR MEDICINE | Facility: HOSPITAL | Age: 65
End: 2025-05-07
Payer: MEDICARE

## 2025-05-07 DIAGNOSIS — E11.9 TYPE 2 DIABETES MELLITUS WITHOUT COMPLICATION, WITHOUT LONG-TERM CURRENT USE OF INSULIN: ICD-10-CM

## 2025-05-07 RX ORDER — METFORMIN HYDROCHLORIDE 500 MG/1
1000 TABLET ORAL
Qty: 360 TABLET | Refills: 0 | Status: SHIPPED | OUTPATIENT
Start: 2025-05-07

## 2025-05-09 DIAGNOSIS — E78.5 HYPERLIPIDEMIA, UNSPECIFIED HYPERLIPIDEMIA TYPE: ICD-10-CM

## 2025-05-09 RX ORDER — ICOSAPENT ETHYL 1 G/1
2 CAPSULE ORAL
Qty: 360 CAPSULE | Refills: 1 | Status: SHIPPED | OUTPATIENT
Start: 2025-05-09

## 2025-05-30 ENCOUNTER — HOSPITAL ENCOUNTER (OUTPATIENT)
Dept: VASCULAR MEDICINE | Facility: HOSPITAL | Age: 65
Discharge: HOME | End: 2025-05-30
Payer: MEDICARE

## 2025-05-30 DIAGNOSIS — Z13.6 ENCOUNTER FOR SCREENING FOR CARDIOVASCULAR DISORDERS: ICD-10-CM

## 2025-05-30 DIAGNOSIS — Z72.0 TOBACCO USE: ICD-10-CM

## 2025-05-30 PROCEDURE — 76706 US ABDL AORTA SCREEN AAA: CPT | Performed by: INTERNAL MEDICINE

## 2025-05-30 PROCEDURE — 76706 US ABDL AORTA SCREEN AAA: CPT

## 2025-06-03 DIAGNOSIS — E11.9 TYPE 2 DIABETES MELLITUS WITHOUT COMPLICATION, WITHOUT LONG-TERM CURRENT USE OF INSULIN: ICD-10-CM

## 2025-06-03 PROCEDURE — RXMED WILLOW AMBULATORY MEDICATION CHARGE

## 2025-06-04 ENCOUNTER — PHARMACY VISIT (OUTPATIENT)
Dept: PHARMACY | Facility: CLINIC | Age: 65
End: 2025-06-04
Payer: COMMERCIAL

## 2025-06-05 RX ORDER — SEMAGLUTIDE 0.68 MG/ML
0.5 INJECTION, SOLUTION SUBCUTANEOUS
Qty: 3 ML | Refills: 2 | Status: SHIPPED | OUTPATIENT
Start: 2025-06-08

## 2025-06-09 DIAGNOSIS — E78.5 HYPERLIPIDEMIA, UNSPECIFIED HYPERLIPIDEMIA TYPE: ICD-10-CM

## 2025-06-09 RX ORDER — ROSUVASTATIN CALCIUM 40 MG/1
40 TABLET, COATED ORAL DAILY
Qty: 90 TABLET | Refills: 0 | Status: SHIPPED | OUTPATIENT
Start: 2025-06-09

## 2025-06-10 ENCOUNTER — APPOINTMENT (OUTPATIENT)
Dept: RADIOLOGY | Facility: CLINIC | Age: 65
End: 2025-06-10
Payer: MEDICARE

## 2025-06-11 ENCOUNTER — APPOINTMENT (OUTPATIENT)
Dept: PRIMARY CARE | Facility: CLINIC | Age: 65
End: 2025-06-11
Payer: MEDICARE

## 2025-06-17 ENCOUNTER — APPOINTMENT (OUTPATIENT)
Dept: ORTHOPEDIC SURGERY | Facility: CLINIC | Age: 65
End: 2025-06-17
Payer: COMMERCIAL

## 2025-06-18 DIAGNOSIS — E78.49 OTHER HYPERLIPIDEMIA: ICD-10-CM

## 2025-06-19 RX ORDER — FENOFIBRATE 145 MG/1
145 TABLET, FILM COATED ORAL DAILY
Qty: 90 TABLET | Refills: 0 | Status: SHIPPED | OUTPATIENT
Start: 2025-06-19

## 2025-06-22 DIAGNOSIS — I10 BENIGN ESSENTIAL HYPERTENSION: ICD-10-CM

## 2025-06-23 DIAGNOSIS — R53.83 FATIGUE, UNSPECIFIED TYPE: ICD-10-CM

## 2025-06-23 DIAGNOSIS — K21.9 GASTROESOPHAGEAL REFLUX DISEASE WITHOUT ESOPHAGITIS: ICD-10-CM

## 2025-06-23 RX ORDER — AMLODIPINE BESYLATE 10 MG/1
10 TABLET ORAL DAILY
Qty: 90 TABLET | Refills: 0 | Status: SHIPPED | OUTPATIENT
Start: 2025-06-23

## 2025-06-24 RX ORDER — OMEPRAZOLE 40 MG/1
40 CAPSULE, DELAYED RELEASE ORAL
Qty: 90 CAPSULE | Refills: 0 | Status: SHIPPED | OUTPATIENT
Start: 2025-06-24

## 2025-06-24 RX ORDER — FOLIC ACID 1 MG/1
1 TABLET ORAL DAILY
Qty: 90 TABLET | Refills: 0 | Status: SHIPPED | OUTPATIENT
Start: 2025-06-24

## 2025-07-01 PROCEDURE — RXMED WILLOW AMBULATORY MEDICATION CHARGE

## 2025-07-02 ENCOUNTER — PHARMACY VISIT (OUTPATIENT)
Dept: PHARMACY | Facility: CLINIC | Age: 65
End: 2025-07-02
Payer: COMMERCIAL

## 2025-07-06 DIAGNOSIS — Z00.00 ENCOUNTER FOR GENERAL ADULT MEDICAL EXAMINATION WITHOUT ABNORMAL FINDINGS: ICD-10-CM

## 2025-07-07 RX ORDER — ALLOPURINOL 300 MG/1
300 TABLET ORAL DAILY
Qty: 90 TABLET | Refills: 0 | Status: SHIPPED | OUTPATIENT
Start: 2025-07-07

## 2025-07-08 ENCOUNTER — OFFICE VISIT (OUTPATIENT)
Dept: PAIN MEDICINE | Facility: HOSPITAL | Age: 65
End: 2025-07-08
Payer: MEDICARE

## 2025-07-08 DIAGNOSIS — M54.16 LUMBAR RADICULOPATHY: ICD-10-CM

## 2025-07-08 PROCEDURE — 99204 OFFICE O/P NEW MOD 45 MIN: CPT | Performed by: ANESTHESIOLOGY

## 2025-07-08 PROCEDURE — 1159F MED LIST DOCD IN RCRD: CPT | Performed by: ANESTHESIOLOGY

## 2025-07-08 PROCEDURE — 99214 OFFICE O/P EST MOD 30 MIN: CPT | Performed by: ANESTHESIOLOGY

## 2025-07-08 PROCEDURE — 1125F AMNT PAIN NOTED PAIN PRSNT: CPT | Performed by: ANESTHESIOLOGY

## 2025-07-08 ASSESSMENT — PAIN SCALES - GENERAL: PAINLEVEL_OUTOF10: 7

## 2025-07-08 NOTE — PROGRESS NOTES
Chief Complaint   Patient presents with    Back Pain        HPI   Mr. Angel is a 64-year-old male who has in the past seen my colleague Dr. Rose but not since May 2022.  In the past he used to get transforaminal injections at L3 which would provide 7 months of relief at a time per prior documentations.  He also was prescribed topiramate.    Patient is reestablishing for pain treatment.  The patient says he was lost to follow-up because he underwent a left-sided knee replacement and was working on rehab from that standpoint.  He says that he has the same pain as what was treated years ago with Dr. Rose.  He has pain that is across the low back radiates into the hips, and then goes in the anterior legs but not below the knee  Patient says that the pain is constant in the low back though it can wax and wane in terms of severity.  He has flares here and there but the pain can escalate be very severe like an 8 out of 10 or more.  He says that the pain is disabling and can interfere significantly with his activities of daily living.  The patient has done prior formal physical therapy and continues to do that at least weekly, the physician directed exercises previously learned have been done at least weekly over the past 2 years or more.  He has used heat and is prescribed cyclobenzaprine by his PCP.  He has been retired since 2021 but had a very physical job and for many years had to lift 100 pound items repeatedly.    ROS: 13 point review of systems is complete and is negative listed above in HPI    Medical History[1]    Surgical History[2]    Family History[3]    Social History[4]    Medications Ordered Prior to Encounter[5]     RX Allergies[6]       Imaging:  Narrative   Interpreted By:  MONI JEFFERSON MD and MAURILIO WILL MD  MRN: [id number]  Patient Name: PRISCILLA ANGEL     STUDY:  MRI L-SPINE WO;  2/16/2023 9:56 am     INDICATION:  Lumbosacral spondylosis  M47.817: Lumbosacral spondylosis.     COMPARISON:  None.      ACCESSION NUMBER(S):  [id number]     ORDERING CLINICIAN:  JONATHAN SONI     TECHNIQUE:  Sagittal T1, T2, STIR, axial T1 and T2 weighted images of the lumbar  spine were acquired.     FINDINGS:  Segmentation: There are 5 lumbar type vertebral bodies.  Alignment: There is trace retrolisthesis of L3 on L4. Alignment is  otherwise maintained.     Vertebrae/Intervertebral Discs: The vertebral bodies demonstrate  expected height. Heterogeneous bone marrow signal without a focal  lesion on the STIR sequence is nonspecific. T1 and T2 hyperintense  lesions within the L3 and L4 vertebral bodies compatible with  hemangiomas. There is desiccated disc signal at L3-L4, L4-L5 and  L5-S1. Mild disc height loss at L3-L4 and moderate to severe disc  height loss at L5-S1.     Conus: The lower thoracic cord appears unremarkable. The conus  terminates at T12-L1 vertebral level.     T12-L1:  There is no significant spinal canal or neural foraminal  stenosis.     L1-2: There is no significant spinal canal or neural foraminal  stenosis.     L2-3: Mild disc bulge and facet arthrosis. There is no significant  spinal canal or neural foraminal stenosis.     L3-4: Left eccentric disc bulge, facet arthrosis and ligamentum  flavum thickening. Mild spinal canal stenosis, narrowing of the left  subarticular recess, moderate left and mild right neural foraminal  stenosis.     L4-5: Disc bulge with an annular fissure and superimposed left  subarticular disc extrusion. Mild spinal canal stenosis, narrowing of  the subarticular recess, left greater than right and mild neural  foraminal narrowing bilaterally.     L5-S1: Right eccentric disc bulge and facet arthrosis. No significant  spinal canal stenosis, narrowing of the subarticular recess, right  greater than left. Moderate bilateral neural foraminal stenosis.     Bilateral adrenal nodules as demonstrated on prior CT abdomen  05/04/2018.      Impression   Multilevel degenerative disc disease and  facet arthrosis without  significant spinal canal stenosis. Neural foraminal narrowing most  pronounced at L3-L4, L4-L5 and L5-S1 as detailed above.       Physical Exam:  Gen.: No acute distress  Eyes: Pupils symmetric, glasses  ENT: Hearing intact with no deficits noted  Respiratory: No gasping or shortness of breath   Cardiovascular: Extremity show no edema   Skin: No rashes, well-healing left knee scar  Musculoskeletal: Gait is grossly normal, no assist device.  Negative SLR bilaterally, intact strength and sensation in bilateral lower extremities outside of some numbness over his prior left total knee replacement scar  Neurologic: Cranial nerves II through XII are grossly intact  Psychiatric:  Patient is alert and oriented x3    Impression/Plan:  64-year-old male with a history of back pain who presents today for reestablishing care.  More than 3 years ago he followed with my colleague Dr. Rose.  He has a history of chronic back pain of over a decade and has recurrent symptoms.    -Will plan for repeat bilateral L3 transforaminal with dexamethasone.  Procedure, risk, benefits, alternatives reviewed with the patient.    -Encouraged to keep up with physician directed exercises and core strengthening.    -Could consider basivertebral nerve ablation down the line.         [1]   Past Medical History:  Diagnosis Date    Abnormal stress test     followed by heart cath    Anxiety disorder, unspecified     Arthritis     Cardiology follow-up encounter     Scheduled with JACOBO Lay NP for 4/11/2024    Cervical disc disorder 10 years    COPD (chronic obstructive pulmonary disease) (Multi)     Coronary atherosclerosis     Diabetes mellitus (Multi)     Elevated prostate specific antigen (PSA)     GERD (gastroesophageal reflux disease)     H/O percutaneous left heart catheterization 03/11/2024    1. Severe branch vessel CAD (D1 and OM1) in a right dominant system.  2.  of small inferior branch of OM1.  3. Elevated LVEDP.   4. No evidence of aortic stenosis.    History of depression     Hyperlipidemia     Hypertension     Joint pain 10 years    Low back pain 10 years    Nephrolithiasis     Neuropathy in diabetes (Multi) 10 years    Sleep apnea     Tobacco use     Unilateral primary osteoarthritis, left knee    [2]   Past Surgical History:  Procedure Laterality Date    ADENOIDECTOMY      APPENDECTOMY      CARDIAC CATHETERIZATION N/A 03/11/2024    Procedure: Left Heart Cath with Coronary Angiography and LV;  Surgeon: Alan Davis MD;  Location: [facility] Cardiac Cath Lab;  Service: Cardiovascular;  Laterality: N/A;  Scheduled 3/11/24 @ 10:30 am    COLONOSCOPY      HERNIA REPAIR      JOINT REPLACEMENT  June 17 2024    KNEE SURGERY      Knee Arthroscopy With Medial And Lateral Meniscus Repair    ORTHOPEDIC SURGERY  June 17 2014    TONSILLECTOMY     [3]   Family History  Problem Relation Name Age of Onset    COPD Mother  70 - 79    Hypertension Father      Diabetes Father      Coronary artery disease Father  40 - 49        s/p CABG    Cancer Father Camilo Blackburn     Heart disease Father Camilo Blackburn     Diabetes type II Father Camilo Blackburn    [4]   Social History  Tobacco Use    Smoking status: Every Day     Current packs/day: 0.50     Average packs/day: 0.5 packs/day for 44.5 years (22.3 ttl pk-yrs)     Types: Cigarettes     Start date: 1981    Smokeless tobacco: Never    Tobacco comments:     continues to smoke 3 to 5 cigarettes a day   Vaping Use    Vaping status: Never Used   Substance Use Topics    Alcohol use: Yes     Alcohol/week: 6.0 standard drinks of alcohol     Types: 6 Cans of beer per week     Comment: 4-6 alcoholic beverages a week    Drug use: Never   [5]   Current Outpatient Medications on File Prior to Visit   Medication Sig Dispense Refill    allopurinol (Zyloprim) 300 mg tablet TAKE 1 TABLET BY MOUTH ONCE DAILY. 90 tablet 0    amLODIPine (Norvasc) 10 mg tablet TAKE 1 TABLET BY MOUTH EVERY DAY 90 tablet 0    aspirin 81 mg EC tablet  Take 1 tablet (81 mg) by mouth once daily.      budesonide-formoteroL (Symbicort) 160-4.5 mcg/actuation inhaler Inhale 2 puffs 2 times a day as needed.      carvedilol (Coreg) 12.5 mg tablet Take 1 tablet (12.5 mg) by mouth 2 times daily (morning and late afternoon). 180 tablet 3    co-enzyme Q-10 30 mg capsule Take 1 capsule (30 mg) by mouth once daily.      cyclobenzaprine (Flexeril) 10 mg tablet TAKE 1 TABLET (10 MG) BY MOUTH AS NEEDED AT BEDTIME FOR MUSCLE SPASMS. 30 tablet 2    evolocumab (Repatha SureClick) 140 mg/mL injection Inject 1 mL (140 mg) under the skin every 14 (fourteen) days. 6 each 4    fenofibrate (Tricor) 145 mg tablet TAKE 1 TABLET (145 MG) BY MOUTH ONCE DAILY. TAKE WITH FOOD. 90 tablet 0    fluticasone (Flonase) 50 mcg/actuation nasal spray Administer 1 spray into each nostril 2 times a day as needed for rhinitis. 16 g 1    folic acid (Folvite) 1 mg tablet TAKE 1 TABLET BY MOUTH EVERY DAY 90 tablet 0    gabapentin (Neurontin) 100 mg capsule Take 1 capsule (100 mg) by mouth 3 times a day. (Patient not taking: No sig reported) 90 capsule 5    icosapent ethyL (Vascepa) 1 gram capsule Take 2 capsules (2 g) by mouth 2 times daily (morning and late afternoon). 360 capsule 1    metFORMIN (Glucophage) 500 mg tablet TAKE 2 TABLETS (1,000 MG) BY MOUTH 2 TIMES DAILY (MORNING AND LATE AFTERNOON). 360 tablet 0    omeprazole (PriLOSEC) 40 mg DR capsule Take 1 capsule (40 mg) by mouth once daily in the morning. Take before meals. Do not crush or chew. 90 capsule 0    ondansetron (Zofran) 4 mg tablet Take 1 tablet (4 mg) by mouth every 8 hours if needed for nausea or vomiting. (Patient not taking: Reported on 4/15/2025) 20 tablet 0    OneTouch Delica Plus Lancet 30 gauge misc Apply 1 Lancet topically once daily. 100 each 0    OneTouch Ultra Test strip 1 strip 2 times a day. 200 strip 1    pantoprazole (ProtoNix) 40 mg EC tablet TAKE 1 TABLET (40 MG) BY MOUTH ONCE DAILY IN THE MORNING BEFORE A MEAL. DO NOT  CRUSH, CHEW, OR SPLIT (Patient not taking: Reported on 4/15/2025) 90 tablet 1    rosuvastatin (Crestor) 40 mg tablet TAKE 1 TABLET BY MOUTH EVERY DAY 90 tablet 0    semaglutide (Ozempic) 0.25 mg or 0.5 mg (2 mg/3 mL) pen injector Inject 0.5 mg under the skin 1 (one) time per week. 3 mL 2    [DISCONTINUED] allopurinol (Zyloprim) 300 mg tablet Take 1 tablet (300 mg) by mouth once daily. 90 tablet 0     No current facility-administered medications on file prior to visit.   [6] No Known Allergies

## 2025-07-20 DIAGNOSIS — M47.27 OSTEOARTHRITIS OF SPINE WITH RADICULOPATHY, LUMBOSACRAL REGION: ICD-10-CM

## 2025-07-20 DIAGNOSIS — M54.16 LUMBAR RADICULOPATHY, CHRONIC: ICD-10-CM

## 2025-07-21 RX ORDER — CYCLOBENZAPRINE HCL 10 MG
10 TABLET ORAL NIGHTLY PRN
Qty: 30 TABLET | Refills: 2 | Status: SHIPPED | OUTPATIENT
Start: 2025-07-21 | End: 2026-03-18

## 2025-07-23 ENCOUNTER — HOSPITAL ENCOUNTER (OUTPATIENT)
Facility: HOSPITAL | Age: 65
Discharge: HOME | End: 2025-07-23
Payer: MEDICARE

## 2025-07-23 VITALS
OXYGEN SATURATION: 96 % | TEMPERATURE: 97.7 F | DIASTOLIC BLOOD PRESSURE: 76 MMHG | RESPIRATION RATE: 18 BRPM | SYSTOLIC BLOOD PRESSURE: 127 MMHG | HEART RATE: 64 BPM

## 2025-07-23 DIAGNOSIS — M54.16 LUMBAR RADICULOPATHY: ICD-10-CM

## 2025-07-23 PROCEDURE — 7100000010 HC PHASE TWO TIME - EACH INCREMENTAL 1 MINUTE

## 2025-07-23 PROCEDURE — 2550000001 HC RX 255 CONTRASTS: Performed by: ANESTHESIOLOGY

## 2025-07-23 PROCEDURE — 64483 NJX AA&/STRD TFRM EPI L/S 1: CPT | Performed by: ANESTHESIOLOGY

## 2025-07-23 PROCEDURE — 64483 NJX AA&/STRD TFRM EPI L/S 1: CPT | Mod: 50 | Performed by: ANESTHESIOLOGY

## 2025-07-23 PROCEDURE — 2500000004 HC RX 250 GENERAL PHARMACY W/ HCPCS (ALT 636 FOR OP/ED): Performed by: ANESTHESIOLOGY

## 2025-07-23 PROCEDURE — 2720000007 HC OR 272 NO HCPCS

## 2025-07-23 PROCEDURE — 7100000009 HC PHASE TWO TIME - INITIAL BASE CHARGE

## 2025-07-23 RX ORDER — MIDAZOLAM HYDROCHLORIDE 1 MG/ML
INJECTION, SOLUTION INTRAMUSCULAR; INTRAVENOUS
Status: COMPLETED | OUTPATIENT
Start: 2025-07-23 | End: 2025-07-23

## 2025-07-23 RX ORDER — LIDOCAINE HYDROCHLORIDE 5 MG/ML
INJECTION, SOLUTION INFILTRATION; INTRAVENOUS
Status: COMPLETED | OUTPATIENT
Start: 2025-07-23 | End: 2025-07-23

## 2025-07-23 RX ORDER — DEXAMETHASONE SODIUM PHOSPHATE 10 MG/ML
INJECTION INTRAMUSCULAR; INTRAVENOUS
Status: COMPLETED | OUTPATIENT
Start: 2025-07-23 | End: 2025-07-23

## 2025-07-23 RX ADMIN — MIDAZOLAM 2 MG: 1 INJECTION INTRAMUSCULAR; INTRAVENOUS at 09:52

## 2025-07-23 RX ADMIN — IOHEXOL 2 ML: 240 INJECTION, SOLUTION INTRATHECAL; INTRAVASCULAR; INTRAVENOUS; ORAL at 10:01

## 2025-07-23 RX ADMIN — LIDOCAINE HYDROCHLORIDE 8 ML: 5 INJECTION, SOLUTION INFILTRATION at 10:00

## 2025-07-23 RX ADMIN — DEXAMETHASONE SODIUM PHOSPHATE 10 MG: 10 INJECTION, SOLUTION INTRAMUSCULAR; INTRAVENOUS at 10:00

## 2025-07-23 ASSESSMENT — PAIN - FUNCTIONAL ASSESSMENT
PAIN_FUNCTIONAL_ASSESSMENT: 0-10
PAIN_FUNCTIONAL_ASSESSMENT: 0-10
PAIN_FUNCTIONAL_ASSESSMENT: WONG-BAKER FACES
PAIN_FUNCTIONAL_ASSESSMENT: 0-10
PAIN_FUNCTIONAL_ASSESSMENT: WONG-BAKER FACES
PAIN_FUNCTIONAL_ASSESSMENT: WONG-BAKER FACES

## 2025-07-23 ASSESSMENT — PAIN SCALES - GENERAL
PAINLEVEL_OUTOF10: 5 - MODERATE PAIN
PAINLEVEL_OUTOF10: 5 - MODERATE PAIN
PAINLEVEL_OUTOF10: 0 - NO PAIN
PAINLEVEL_OUTOF10: 0 - NO PAIN
PAINLEVEL_OUTOF10: 5 - MODERATE PAIN
PAINLEVEL_OUTOF10: 5 - MODERATE PAIN

## 2025-07-23 NOTE — H&P
Pain Management H&P    History Of Present Illness  Hammad Blackburn is a 64 y.o. male presents for procedure stated below. Endorses no changes in past medical history or medical health since last seen in clinic.      Past Medical History  He has a past medical history of Abnormal stress test, Anxiety disorder, unspecified, Arthritis, Cardiology follow-up encounter, Cervical disc disorder (10 years), COPD (chronic obstructive pulmonary disease) (Multi), Coronary atherosclerosis, Diabetes mellitus (Multi), Elevated prostate specific antigen (PSA), GERD (gastroesophageal reflux disease), H/O percutaneous left heart catheterization (03/11/2024), History of depression, Hyperlipidemia, Hypertension, Joint pain (10 years), Low back pain (10 years), Nephrolithiasis, Neuropathy in diabetes (Multi) (10 years), Sleep apnea, Tobacco use, and Unilateral primary osteoarthritis, left knee.    Surgical History  He has a past surgical history that includes Colonoscopy; Knee surgery; Appendectomy; Tonsillectomy; Adenoidectomy; Hernia repair; Cardiac catheterization (N/A, 03/11/2024); orthopedic surgery (June 17 2014); and Joint replacement (June 17 2024).     Social History  He reports that he has been smoking cigarettes. He started smoking about 44 years ago. He has a 22.3 pack-year smoking history. He has never used smokeless tobacco. He reports current alcohol use of about 6.0 standard drinks of alcohol per week. He reports that he does not use drugs.    Family History  Family History[1]     Allergies  Patient has no known allergies.    Review of Symptoms:   Constitutional: Negative for chills, diaphoresis or fever  HENT: Negative for neck swelling  Eyes:.  Negative for eye pain  Respiratory:.  Negative for cough, shortness of breath or wheezing    Cardiovascular:.  Negative for chest pain or palpitations  Gastrointestinal:.  Negative for abdominal pain, nausea and vomiting  Genitourinary:.  Negative for urgency  Musculoskeletal:   Positive for back pain. Positive for joint pain. Denies falls within the past 3 months.  Skin: Negative for wounds or itching   Neurological: Negative for dizziness, seizures, loss of consciousness and weakness  Endo/Heme/Allergies: Does not bruise/bleed easily  Psychiatric/Behavioral: Negative for depression. The patient does not appear anxious.      Pre-sedation Evaluation  ASA class 3  Mallampati score 2     PHYSICAL EXAM  Vitals signs reviewed  Constitutional:       General: Not in acute distress     Appearance: Normal appearance. Not ill-appearing.  HENT:     Head: Normocephalic and atraumatic  Eyes:     Conjunctiva/sclera: Conjunctivae normal  Cardiovascular:     Rate and Rhythm: Normal rate and regular rhythm  Pulmonary:     Effort: No respiratory distress  Abdominal:     Palpations: Abdomen is soft  Musculoskeletal: MORE  Skin:     General: Skin is warm and dry  Neurological:     General: No focal deficit present  Psychiatric:         Mood and Affect: Mood normal         Behavior: Behavior normal     Last Recorded Vitals  There were no vitals taken for this visit.    Relevant Results  Current Outpatient Medications   Medication Instructions    allopurinol (ZYLOPRIM) 300 mg, oral, Daily    amLODIPine (NORVASC) 10 mg, oral, Daily    aspirin 81 mg, Daily    budesonide-formoteroL (Symbicort) 160-4.5 mcg/actuation inhaler 2 puffs, 2 times daily PRN    carvedilol (COREG) 12.5 mg, oral, 2 times daily (morning and late afternoon)    co-enzyme Q-10 30 mg capsule 1 capsule, Daily    cyclobenzaprine (FLEXERIL) 10 mg, oral, Nightly PRN    fenofibrate (TRICOR) 145 mg, oral, Daily, Take with food.    fluticasone (Flonase) 50 mcg/actuation nasal spray 1 spray, Each Nostril, 2 times daily PRN    folic acid (FOLVITE) 1 mg, oral, Daily    gabapentin (NEURONTIN) 100 mg, oral, 3 times daily    icosapent ethyL (VASCEPA) 2 g, oral, 2 times daily (morning and late afternoon)    metFORMIN (GLUCOPHAGE) 1,000 mg, oral, 2 times daily  (morning and late afternoon)    omeprazole (PRILOSEC) 40 mg, oral, Daily before breakfast, Do not crush or chew.    ondansetron (ZOFRAN) 4 mg, oral, Every 8 hours PRN    OneTouch Delica Plus Lancet 30 gauge misc 1 Lancet, Topical, Daily    OneTouch Ultra Test strip 1 strip, miscellaneous, 2 times daily    Ozempic 0.5 mg, subcutaneous, Once Weekly    pantoprazole (ProtoNix) 40 mg EC tablet TAKE 1 TABLET (40 MG) BY MOUTH ONCE DAILY IN THE MORNING BEFORE A MEAL. DO NOT CRUSH, CHEW, OR SPLIT    Repatha SureClick 140 mg, subcutaneous, Every 14 days    rosuvastatin (CRESTOR) 40 mg, oral, Daily         MR LUMBAR SPINE WO IV CONTRAST     Narrative  Interpreted By:  MONI JEFFERSON MD and MAURILIO WILL MD  MRN: [id number]  Patient Name: PRISCILLA ANGEL    STUDY:  MRI L-SPINE WO;  2/16/2023 9:56 am    INDICATION:  Lumbosacral spondylosis  M47.817: Lumbosacral spondylosis.    COMPARISON:  None.    ACCESSION NUMBER(S):  [id number]    ORDERING CLINICIAN:  JONATHAN SONI    TECHNIQUE:  Sagittal T1, T2, STIR, axial T1 and T2 weighted images of the lumbar  spine were acquired.    FINDINGS:  Segmentation: There are 5 lumbar type vertebral bodies.  Alignment: There is trace retrolisthesis of L3 on L4. Alignment is  otherwise maintained.    Vertebrae/Intervertebral Discs: The vertebral bodies demonstrate  expected height. Heterogeneous bone marrow signal without a focal  lesion on the STIR sequence is nonspecific. T1 and T2 hyperintense  lesions within the L3 and L4 vertebral bodies compatible with  hemangiomas. There is desiccated disc signal at L3-L4, L4-L5 and  L5-S1. Mild disc height loss at L3-L4 and moderate to severe disc  height loss at L5-S1.    Conus: The lower thoracic cord appears unremarkable. The conus  terminates at T12-L1 vertebral level.    T12-L1:  There is no significant spinal canal or neural foraminal  stenosis.    L1-2: There is no significant spinal canal or neural foraminal  stenosis.    L2-3: Mild disc bulge and  facet arthrosis. There is no significant  spinal canal or neural foraminal stenosis.    L3-4: Left eccentric disc bulge, facet arthrosis and ligamentum  flavum thickening. Mild spinal canal stenosis, narrowing of the left  subarticular recess, moderate left and mild right neural foraminal  stenosis.    L4-5: Disc bulge with an annular fissure and superimposed left  subarticular disc extrusion. Mild spinal canal stenosis, narrowing of  the subarticular recess, left greater than right and mild neural  foraminal narrowing bilaterally.    L5-S1: Right eccentric disc bulge and facet arthrosis. No significant  spinal canal stenosis, narrowing of the subarticular recess, right  greater than left. Moderate bilateral neural foraminal stenosis.    Bilateral adrenal nodules as demonstrated on prior CT abdomen  05/04/2018.    Impression  Multilevel degenerative disc disease and facet arthrosis without  significant spinal canal stenosis. Neural foraminal narrowing most  pronounced at L3-L4, L4-L5 and L5-S1 as detailed above.    I personally reviewed the images/study and I agree with the findings  as stated. This study was interpreted at [facility], [place], [place].       ASSESSMENT/PLAN  Hammad Blackburn is a 64 y.o. male here for bilateral L3 TFESI    Patient denies any recent antibiotic use or infections, denies any blood thinner use, and denies contrast or local anesthetic allergies     Risks, benefits, alternatives discussed. All questions answered to the best of my ability. Patient agrees to proceed.      Our plan is as follows:  - Proceed with aforementioned procedure       Rubens Rolle MD  Chronic Pain Management Fellow         [1]   Family History  Problem Relation Name Age of Onset    COPD Mother  70 - 79    Hypertension Father      Diabetes Father      Coronary artery disease Father  40 - 49        s/p CABG    Cancer Father Camilo Blackbrun     Heart disease Father Camilo Blackburn     Diabetes  type II Father Camilo Blackburn

## 2025-07-23 NOTE — DISCHARGE INSTRUCTIONS
DISCHARGE INSTRUCTIONS FOR INJECTIONS     After most injections, it is recommended that you relax and limit your activity for the remainder of the day unless you have been told otherwise by your pain physician.  You should not drive a car, operate machinery, or make important legal decisions unless otherwise directed by your pain physician.  You may resume your normal activity, including exercise, tomorrow.      Keep a written pain diary of how much pain relief you experienced following the injection procedure and the length of time of pain relief you experienced pain relief. Following diagnostic injections like medial branch nerve blocks, sacroiliac joint blocks, stellate ganglion injections and other blocks, it is very important you record the specific amount of pain relief you experienced immediately after the injectionand how long it lasted. Your doctor will ask you for this information at your follow up visit.     For all injections, please keep the injection site dry and inspect the site for a couple of days. You may remove the Band-Aid the day of the injection at any time.     Some discomfort, bruising or slight swelling may occur at the injection site. This is not abnormal if it occurs.  If needed you may:    -Take over the counter medication such as Tylenol or Motrin.   -Apply an ice pack for 30 minutes, 2 to 3 times a day for the first 24 hours.     You may shower today; no soaking baths, hot tubs, whirlpools or swimming pools for two days.      If you are given steroids in your injection, it may take 3-5 days for the steroid medication to take effect. You may notice a worsening of your symptoms for 1-2 days after the injection. This is not abnormal.  You may use acetaminophen, ibuprofen, or prescription medication that your doctor may have prescribed for you if you need to do so.     A few common side effects of steroids include facial flushing, sweating, restlessness, irritability,difficulty sleeping,  increase in blood sugar, and increased blood pressure. If you have diabetes, please monitor your blood sugar at least once a day for at least 5 days. If you have poorly controlled high blood pressure, monitoryour blood pressure for at least 2 days and contact your primary care physician if these numbers are unusually high for you.      If you take aspirin or non-steroidal anti-inflammatory drugs (examples are Motrin, Advil, ibuprofen, Naprosyn, Voltaren, Relafen, etc.) you may restart these this evening, but stop taking it 3 days before your next appointment, unless instructed otherwiseby your physician.      You do not need to discontinue non-aspirin-containing pain medications prior to an injection (examples: Celebrex, tramadol, hydrocodone and acetaminophen).      If you take a blood thinning medication (Coumadin, Lovenox, Fragmin,Ticlid, Plavix, Pradaxa, etc.), please discuss this with your primary care physician/cardiologist and your pain physician. These medications MUST be discontinued before you can have an injection safely, without the risk of uncontrolled bleeding. If these medications are not discontinued for an appropriate period of time, you will not be able to receivean injection. Please adhere to instructions given to you about when to restart your blood thinning medication. If you have any questions please reach out to our team.    If you are taking Coumadin, please have your INR checked the morning of your procedure and bring the result to your appointment unless otherwise instructed. If your INR is over 1.2, your injection may need to be rescheduled to avoid uncontrolled bleeding from the needle placement.     Call [facility]  and ask for Pain Management at [phone] between 8am-4pm Monday - Friday if you are experiencing the following:    If you received an epidural or spinal injection:    -Headache that doesnot go away with medicine, is worse when sitting or standing up, and is greatly  relieved upon lying down.   -Severe pain worse than or different than your baseline pain.   -Chills or fever (101º F or greater).   -Drainage or signs of infection at the injection site     Go directly to the Emergency Department if you are experiencing the following and received an epidural or spinal injection:   -Abrupt weakness or progressive weakness in your legs that starts after you leave the clinic.   -Abrupt severe or worsening numbness in your legs.   -Inability to urinate after the injection or loss of bowel or bladder control without the urge to defecate or urinate.     If you have a clinical question that cannot wait until your next appointment, please call [phone] between 8am-4pm Monday - Friday or send a [facility] message. We do our best to return all non-emergency messages within 24 hours, Monday - Friday. A nurse or physician will return your message. You may also try calling and they will do their best to answer your question(s):   - Dr. Marko Guzman's nurse ([phone])  - Dr. Byron Lucas/Dr. No's nurse ([phone])  - Dr. Marcella Esposito/[facility]'s nurse ([phone])     If you need to cancel an appointment, please call the scheduling staff at [phone] during normal business hours or leave a message at least 24 hours in advance.     If you are going to be sedated for your next procedure, you MUST have responsible adult who can legally drive accompany you home. You cannot eat or drink for at least eight hours prior to the planned procedure if you are going to receive sedation. You may take your non-blood thinning medications with a small sip of water.

## 2025-07-30 DIAGNOSIS — F40.240 CLAUSTROPHOBIA: ICD-10-CM

## 2025-07-30 DIAGNOSIS — M54.16 LUMBAR RADICULOPATHY: ICD-10-CM

## 2025-07-30 RX ORDER — DIAZEPAM 10 MG/1
10 TABLET ORAL ONCE AS NEEDED
Qty: 1 TABLET | Refills: 0 | Status: SHIPPED | OUTPATIENT
Start: 2025-07-30

## 2025-08-03 DIAGNOSIS — E11.9 TYPE 2 DIABETES MELLITUS WITHOUT COMPLICATION, WITHOUT LONG-TERM CURRENT USE OF INSULIN: ICD-10-CM

## 2025-08-04 DIAGNOSIS — J31.0 RHINITIS, UNSPECIFIED TYPE: ICD-10-CM

## 2025-08-04 DIAGNOSIS — E11.9 TYPE 2 DIABETES MELLITUS WITHOUT COMPLICATION, WITHOUT LONG-TERM CURRENT USE OF INSULIN: ICD-10-CM

## 2025-08-04 PROCEDURE — RXMED WILLOW AMBULATORY MEDICATION CHARGE

## 2025-08-04 RX ORDER — METFORMIN HYDROCHLORIDE 500 MG/1
1000 TABLET ORAL
Qty: 360 TABLET | Refills: 0 | Status: SHIPPED | OUTPATIENT
Start: 2025-08-04

## 2025-08-05 ENCOUNTER — PHARMACY VISIT (OUTPATIENT)
Dept: PHARMACY | Facility: CLINIC | Age: 65
End: 2025-08-05
Payer: COMMERCIAL

## 2025-08-06 RX ORDER — BLOOD SUGAR DIAGNOSTIC
1 STRIP MISCELLANEOUS 2 TIMES DAILY
Qty: 200 STRIP | Refills: 1 | Status: SHIPPED | OUTPATIENT
Start: 2025-08-06

## 2025-08-06 RX ORDER — FLUTICASONE PROPIONATE 50 MCG
1 SPRAY, SUSPENSION (ML) NASAL 2 TIMES DAILY PRN
Qty: 16 G | Refills: 1 | Status: SHIPPED | OUTPATIENT
Start: 2025-08-06

## 2025-08-08 ENCOUNTER — APPOINTMENT (OUTPATIENT)
Dept: PRIMARY CARE | Facility: CLINIC | Age: 65
End: 2025-08-08
Payer: MEDICARE

## 2025-08-08 ENCOUNTER — OFFICE VISIT (OUTPATIENT)
Dept: PRIMARY CARE | Facility: CLINIC | Age: 65
End: 2025-08-08
Payer: MEDICARE

## 2025-08-08 VITALS
OXYGEN SATURATION: 97 % | BODY MASS INDEX: 31.58 KG/M2 | HEIGHT: 75 IN | DIASTOLIC BLOOD PRESSURE: 74 MMHG | SYSTOLIC BLOOD PRESSURE: 148 MMHG | WEIGHT: 254 LBS | HEART RATE: 61 BPM

## 2025-08-08 DIAGNOSIS — R35.1 BPH ASSOCIATED WITH NOCTURIA: ICD-10-CM

## 2025-08-08 DIAGNOSIS — F17.210 TOBACCO DEPENDENCE DUE TO CIGARETTES: ICD-10-CM

## 2025-08-08 DIAGNOSIS — N40.1 BPH ASSOCIATED WITH NOCTURIA: ICD-10-CM

## 2025-08-08 DIAGNOSIS — E11.59 TYPE 2 DIABETES MELLITUS WITH OTHER CIRCULATORY COMPLICATION, WITHOUT LONG-TERM CURRENT USE OF INSULIN: Primary | ICD-10-CM

## 2025-08-08 DIAGNOSIS — I10 BENIGN ESSENTIAL HYPERTENSION: ICD-10-CM

## 2025-08-08 LAB — POC HEMOGLOBIN A1C: 5.8 % (ref 4.2–7)

## 2025-08-08 RX ORDER — TAMSULOSIN HYDROCHLORIDE 0.4 MG/1
0.4 CAPSULE ORAL DAILY
Qty: 30 CAPSULE | Refills: 11 | Status: SHIPPED | OUTPATIENT
Start: 2025-08-08 | End: 2026-08-08

## 2025-08-08 ASSESSMENT — PATIENT HEALTH QUESTIONNAIRE - PHQ9
2. FEELING DOWN, DEPRESSED OR HOPELESS: NOT AT ALL
SUM OF ALL RESPONSES TO PHQ9 QUESTIONS 1 AND 2: 0
1. LITTLE INTEREST OR PLEASURE IN DOING THINGS: NOT AT ALL

## 2025-08-08 ASSESSMENT — ENCOUNTER SYMPTOMS
OCCASIONAL FEELINGS OF UNSTEADINESS: 0
DEPRESSION: 0
LOSS OF SENSATION IN FEET: 0

## 2025-08-08 NOTE — PROGRESS NOTES
Subjective   Hammad Blackburn is a 64 y.o. male who presents for No chief complaint on file..  #BPH  -Multiple PSAs within normal limits previously  -now waking up multiple times per night to urinate  -denies fevers, chills, hematuria    #HTN  -BP Readings from Last 1 Encounters:  08/08/25 : 148/74  -denies chest pain, SOB, vision changes, headaches.  -currently on AMLODIPINE  -has taken medication today  -back pain likely a factor                  This is a diabetes follow up visit for Hammad Blackburn. The current treatment includes diet, exercise, oral medications, and GLP 1 injections and he is compliant all of the time. Symptoms include none. Glucose is monitored: once daily, and average sugars are less than 100. he reports good compliance with a low carbohydrate diet, and exercises infrequently .    Patient is not prescribed ACE/ARB/ARNI medication   Patient is prescribed a STATIN medication  Patient is prescribed a SGLT2/GLP1 medication    Last Flu Vaccine given:            10/19/2021   Last PCV Vaccine given:       Lab Results   Component Value Date    HGBA1C 6.0 (H) 03/14/2025    CREATININE 1.01 03/14/2025    MICROALBCREA 215.2 (H) 09/06/2024    LDLCALC <10 03/14/2025        Last Eye Exam: unknown     Liver Screening: FIB-4 Calculation: 1.14 at 3/14/2025 10:43 AM  Calculated from:  SGOT/AST: 13 U/L at 3/14/2025 10:43 AM  SGPT/ALT: 11 U/L at 3/14/2025 10:43 AM  Platelets: 223 Thousand/uL at 3/14/2025 10:43 AM  Age: 64 years    Interpretation: <1.45 Cirrhosis less likely, 1.45 - 3.25 Indeterminate, >3.25 Cirrhosis more likely    Review of Systems   All other systems reviewed and are negative.    There were no vitals taken for this visit.There is no height or weight on file to calculate BMI.   Objective   Physical Exam  Vitals reviewed.   Constitutional:       Appearance: Normal appearance. He is obese.   HENT:      Head: Normocephalic and atraumatic.     Eyes:      Extraocular Movements: Extraocular movements intact.       Pupils: Pupils are equal, round, and reactive to light.       Cardiovascular:      Rate and Rhythm: Normal rate and regular rhythm.      Pulses: Normal pulses.      Heart sounds: Normal heart sounds. No murmur heard.  Pulmonary:      Effort: Pulmonary effort is normal.      Breath sounds: Normal breath sounds.     Musculoskeletal:      Cervical back: Normal range of motion.     Neurological:      Mental Status: He is alert.     Psychiatric:         Mood and Affect: Mood normal.         Normal BILATERAL diabetic foot exam: Pulses are palpable, sensation is intact, and there are no ulcers or lesions.     Assessment & Plan  Type 2 diabetes mellitus with other circulatory complication, without long-term current use of insulin  -Last A1c -  Lab Results   Component Value Date    HGBA1C 5.8 08/08/2025    [id number] 126 03/14/2025     -last urine microalbumin on September 2024 showed no proteinuria  -last eye appointment:   -last foot exam:   -currently On statin medication  -currently Not on ACE/ARB  -currently on GLP1 and Metformin  -last LDL was   Lab Results   Component Value Date    LDLCALC <10 03/14/2025     -checking blood sugar daily with values around   -denies numbness and tingling  of extremities  -denies hypoglycemic episodes    Orders:    POCT glycosylated hemoglobin (Hb A1C) manually resulted    Albumin-Creatinine Ratio, Urine Random; Future    Referral to Ophthalmology; Future    Tobacco dependence due to cigarettes    Orders:    CT lung screening low dose; Future    BPH associated with nocturia  -Multiple PSAs within normal limits previously  -now waking up multiple times per night to urinate  -denies fevers, chills, hematuria  -trial flomax  Orders:    tamsulosin (Flomax) 0.4 mg 24 hr capsule; Take 1 capsule (0.4 mg) by mouth once daily. Do not crush, chew, or split.    Benign essential hypertension  -BP Readings from Last 1 Encounters:  08/08/25 : 148/74  -denies chest pain, SOB, vision changes,  headaches.  -currently on AMLODIPINE  -has taken medication today  -back pain likely a factor  -will trial LISINOPRIL or LOSARTAN at next visit if bp not improved                   Vik De MD 08/08/25 2:54 PM

## 2025-08-08 NOTE — ASSESSMENT & PLAN NOTE
-BP Readings from Last 1 Encounters:  08/08/25 : 148/74  -denies chest pain, SOB, vision changes, headaches.  -currently on AMLODIPINE  -has taken medication today  -back pain likely a factor  -will trial LISINOPRIL or LOSARTAN at next visit if bp not improved

## 2025-08-08 NOTE — ASSESSMENT & PLAN NOTE
-Last A1c -  Lab Results   Component Value Date    HGBA1C 5.8 08/08/2025    [id number] 126 03/14/2025     -last urine microalbumin on September 2024 showed no proteinuria  -last eye appointment:   -last foot exam:   -currently On statin medication  -currently Not on ACE/ARB  -currently on GLP1 and Metformin  -last LDL was   Lab Results   Component Value Date    LDLCALC <10 03/14/2025     -checking blood sugar daily with values around   -denies numbness and tingling  of extremities  -denies hypoglycemic episodes    Orders:    POCT glycosylated hemoglobin (Hb A1C) manually resulted    Albumin-Creatinine Ratio, Urine Random; Future    Referral to Ophthalmology; Future

## 2025-08-14 ENCOUNTER — OFFICE VISIT (OUTPATIENT)
Dept: PAIN MEDICINE | Facility: HOSPITAL | Age: 65
End: 2025-08-14
Payer: MEDICARE

## 2025-08-14 ENCOUNTER — APPOINTMENT (OUTPATIENT)
Dept: PRIMARY CARE | Facility: CLINIC | Age: 65
End: 2025-08-14
Payer: MEDICARE

## 2025-08-14 DIAGNOSIS — M54.16 LUMBAR RADICULOPATHY: ICD-10-CM

## 2025-08-14 PROCEDURE — 1125F AMNT PAIN NOTED PAIN PRSNT: CPT | Performed by: ANESTHESIOLOGY

## 2025-08-14 PROCEDURE — 1159F MED LIST DOCD IN RCRD: CPT | Performed by: ANESTHESIOLOGY

## 2025-08-14 PROCEDURE — 99214 OFFICE O/P EST MOD 30 MIN: CPT | Performed by: ANESTHESIOLOGY

## 2025-08-14 PROCEDURE — 99213 OFFICE O/P EST LOW 20 MIN: CPT | Performed by: ANESTHESIOLOGY

## 2025-08-14 PROCEDURE — 3044F HG A1C LEVEL LT 7.0%: CPT | Performed by: ANESTHESIOLOGY

## 2025-08-14 RX ORDER — METHYLPREDNISOLONE 4 MG/1
TABLET ORAL
Qty: 21 TABLET | Refills: 0 | Status: SHIPPED | OUTPATIENT
Start: 2025-08-14

## 2025-08-14 ASSESSMENT — PAIN SCALES - GENERAL: PAINLEVEL_OUTOF10: 7

## 2025-08-15 LAB
ALBUMIN/CREAT UR: 78 MG/G CREAT
CREAT UR-MCNC: 176 MG/DL (ref 20–320)
MICROALBUMIN UR-MCNC: 13.7 MG/DL

## 2025-08-19 ENCOUNTER — APPOINTMENT (OUTPATIENT)
Dept: OPHTHALMOLOGY | Facility: CLINIC | Age: 65
End: 2025-08-19
Payer: MEDICARE

## 2025-08-19 DIAGNOSIS — E11.9 TYPE 2 DIABETES MELLITUS WITHOUT RETINOPATHY (MULTI): Primary | ICD-10-CM

## 2025-08-19 DIAGNOSIS — H52.223 MYOPIA OF BOTH EYES WITH REGULAR ASTIGMATISM AND PRESBYOPIA: ICD-10-CM

## 2025-08-19 DIAGNOSIS — H52.4 MYOPIA OF BOTH EYES WITH REGULAR ASTIGMATISM AND PRESBYOPIA: ICD-10-CM

## 2025-08-19 DIAGNOSIS — H52.13 MYOPIA OF BOTH EYES WITH REGULAR ASTIGMATISM AND PRESBYOPIA: ICD-10-CM

## 2025-08-19 PROCEDURE — 92015 DETERMINE REFRACTIVE STATE: CPT

## 2025-08-19 PROCEDURE — 92004 COMPRE OPH EXAM NEW PT 1/>: CPT

## 2025-08-19 ASSESSMENT — KERATOMETRY
OD_AXISANGLE2_DEGREES: 2
OS_K1POWER_DIOPTERS: 42.00
OS_AXISANGLE_DEGREES: 71
OD_K2POWER_DIOPTERS: 43.00
OD_AXISANGLE_DEGREES: 92
OS_K2POWER_DIOPTERS: 43.25
OD_K1POWER_DIOPTERS: 42.00
OS_AXISANGLE2_DEGREES: 161
METHOD_AUTO_MANUAL: AUTOMATED

## 2025-08-19 ASSESSMENT — REFRACTION_WEARINGRX
SPECS_TYPE: PAL
OS_AXIS: 158
OS_SPHERE: -2.50
OD_CYLINDER: -1.25
OS_CYLINDER: -1.25
OD_ADD: +2.50
OD_SPHERE: -2.50
OS_ADD: +2.50
OD_AXIS: 024

## 2025-08-19 ASSESSMENT — EXTERNAL EXAM - LEFT EYE: OS_EXAM: NORMAL

## 2025-08-19 ASSESSMENT — CONF VISUAL FIELD
OS_INFERIOR_NASAL_RESTRICTION: 0
OS_NORMAL: 1
OD_INFERIOR_NASAL_RESTRICTION: 0
OS_INFERIOR_TEMPORAL_RESTRICTION: 0
OD_SUPERIOR_NASAL_RESTRICTION: 0
OS_SUPERIOR_TEMPORAL_RESTRICTION: 0
OD_SUPERIOR_TEMPORAL_RESTRICTION: 0
OS_SUPERIOR_NASAL_RESTRICTION: 0
OD_NORMAL: 1
OD_INFERIOR_TEMPORAL_RESTRICTION: 0

## 2025-08-19 ASSESSMENT — SLIT LAMP EXAM - LIDS
COMMENTS: NORMAL
COMMENTS: NORMAL

## 2025-08-19 ASSESSMENT — PATIENT HEALTH QUESTIONNAIRE - PHQ9
1. LITTLE INTEREST OR PLEASURE IN DOING THINGS: NOT AT ALL
SUM OF ALL RESPONSES TO PHQ9 QUESTIONS 1 AND 2: 0
2. FEELING DOWN, DEPRESSED OR HOPELESS: NOT AT ALL

## 2025-08-19 ASSESSMENT — VISUAL ACUITY
CORRECTION_TYPE: GLASSES
OS_CC: 20/20
METHOD: SNELLEN - SINGLE
OD_CC+: -1
OD_CC: 20/20

## 2025-08-19 ASSESSMENT — TONOMETRY
OS_IOP_MMHG: 16
OD_IOP_MMHG: 14
IOP_METHOD: GOLDMANN APPLANATION

## 2025-08-19 ASSESSMENT — REFRACTION_MANIFEST
OD_ADD: +2.50
OS_SPHERE: -2.25
OD_SPHERE: -2.25
OS_AXIS: 160
OD_AXIS: 020
OS_CYLINDER: -1.00
OS_ADD: +2.50
OD_CYLINDER: -1.00

## 2025-08-19 ASSESSMENT — CUP TO DISC RATIO
OD_RATIO: 0.25
OS_RATIO: 0.25

## 2025-08-19 ASSESSMENT — EXTERNAL EXAM - RIGHT EYE: OD_EXAM: NORMAL

## 2025-08-19 ASSESSMENT — PAIN SCALES - GENERAL: PAINLEVEL_OUTOF10: 0-NO PAIN

## 2025-08-27 PROCEDURE — RXMED WILLOW AMBULATORY MEDICATION CHARGE

## 2025-08-28 ENCOUNTER — PHARMACY VISIT (OUTPATIENT)
Dept: PHARMACY | Facility: CLINIC | Age: 65
End: 2025-08-28
Payer: COMMERCIAL

## 2025-08-29 ENCOUNTER — APPOINTMENT (OUTPATIENT)
Dept: PRIMARY CARE | Facility: CLINIC | Age: 65
End: 2025-08-29
Payer: MEDICARE

## 2025-08-30 DIAGNOSIS — E11.9 TYPE 2 DIABETES MELLITUS WITHOUT COMPLICATION, WITHOUT LONG-TERM CURRENT USE OF INSULIN: ICD-10-CM

## 2025-09-01 DIAGNOSIS — J31.0 RHINITIS, UNSPECIFIED TYPE: ICD-10-CM

## 2025-09-02 RX ORDER — SEMAGLUTIDE 0.68 MG/ML
0.5 INJECTION, SOLUTION SUBCUTANEOUS
Qty: 2 ML | Refills: 2 | Status: SHIPPED | OUTPATIENT
Start: 2025-09-02

## 2025-09-03 RX ORDER — FLUTICASONE PROPIONATE 50 MCG
SPRAY, SUSPENSION (ML) NASAL
Qty: 48 ML | Refills: 1 | Status: SHIPPED | OUTPATIENT
Start: 2025-09-03

## 2025-09-04 ENCOUNTER — HOSPITAL ENCOUNTER (OUTPATIENT)
Dept: RADIOLOGY | Facility: HOSPITAL | Age: 65
Discharge: HOME | End: 2025-09-04
Payer: MEDICARE

## 2025-09-04 DIAGNOSIS — F17.210 TOBACCO DEPENDENCE DUE TO CIGARETTES: ICD-10-CM

## 2025-09-04 PROCEDURE — 71271 CT THORAX LUNG CANCER SCR C-: CPT

## 2025-11-10 ENCOUNTER — APPOINTMENT (OUTPATIENT)
Dept: PRIMARY CARE | Facility: CLINIC | Age: 65
End: 2025-11-10
Payer: MEDICARE

## 2026-08-21 ENCOUNTER — APPOINTMENT (OUTPATIENT)
Dept: OPHTHALMOLOGY | Facility: CLINIC | Age: 66
End: 2026-08-21
Payer: MEDICARE

## (undated) DEVICE — INTRODUCER, GLIDESHEATH SLENDER A-KIT, 6FR 10CM

## (undated) DEVICE — THERAPY UNIT, PREVENA PLUS 125

## (undated) DEVICE — GLOVE, SURGICAL, PROTEXIS PI BLUE W/NEUTHERA, 7.0, PF, LF

## (undated) DEVICE — GLOVE, SURGICAL, PROTEXIS PI MICRO, 8.0, PF, LF

## (undated) DEVICE — DRAPE, ISOLATION, ANTIMICROBIAL, W/POUCH, IOBAN 2, STERI DRAPE, 125 X 83 IN, DISPOSABLE, STERILE

## (undated) DEVICE — BLADE, SAW, SAGITTAL, DUAL CUT, 18 X 90 X 1.27

## (undated) DEVICE — CATHETER, DIAGNOSTIC, 4 FR-JR 4

## (undated) DEVICE — MIXER, CEMENT, MIXEVAC III HIGH VACUUM KIT, STERILE

## (undated) DEVICE — HOOD, SURGICAL, FLYTE SURGICOOL

## (undated) DEVICE — TOWEL, SURGICAL, NEURO, O/R, 16 X 26, BLUE, STERILE

## (undated) DEVICE — SUTURE, VICRYL, 0, 27 IN, CT-1, UNDYED

## (undated) DEVICE — GLOVE, SURGICAL, PROTEXIS PI ORTHO, 8.0, PF, LF

## (undated) DEVICE — Device

## (undated) DEVICE — GUIDEWIRE, INQWIRE, 3MM J, .035 X 210CM, FIXED

## (undated) DEVICE — HIGH FLOW TIP FOR INTERPULSE HANDPIECE SET

## (undated) DEVICE — ADHESIVE, SKIN, LIQUIBAND EXCEED

## (undated) DEVICE — MARKER, SKIN, DUAL TIP INK W/9 LABEL AND REMOVABLE TIME OUT SLEEVE

## (undated) DEVICE — NEEDLE, SPINAL, QUINCKE, 18 G X 3.5 IN, PINK HUB

## (undated) DEVICE — SUTURE, STRATAFIX PDS PLUS, 1, OS-6, SYMMETRIC 60CM

## (undated) DEVICE — CATHETER, ANGIO, IMPULSE, FL3.5, 5 FR X 100 CM

## (undated) DEVICE — STRIP, SKIN CLOSURE, STERI STRIP, REINFORCED, 0.5 X 4 IN

## (undated) DEVICE — GLOVE, SURGICAL, PROTEXIS PI BLUE W/NEUTHERA, 8.0, PF, LF

## (undated) DEVICE — SUTURE, MONOCRYL, 3-0, 27 IN, PS-2, UNDYED

## (undated) DEVICE — INTERPULSE HANDPIECE SET W/ 10FT SUCTION TUBING

## (undated) DEVICE — BLADE, SAW, RECIPROCATING, DOUBLE-SIDED, 70 X 12.5 X 1 MM, STAINLESS STEEL

## (undated) DEVICE — WOUND SYSTEM, DEBRIDEMENT & CLEANING, O.R DUOPAK

## (undated) DEVICE — TR BAND, RADIAL COMPRESSION, LONG, 29CM

## (undated) DEVICE — GUIDEWIRE, HI-TORQUE, VERSACORE, 260CM, FLOPPY